# Patient Record
Sex: FEMALE | Race: WHITE | NOT HISPANIC OR LATINO | Employment: FULL TIME | URBAN - METROPOLITAN AREA
[De-identification: names, ages, dates, MRNs, and addresses within clinical notes are randomized per-mention and may not be internally consistent; named-entity substitution may affect disease eponyms.]

---

## 2017-01-12 ENCOUNTER — COMMUNICATION - HEALTHEAST (OUTPATIENT)
Dept: FAMILY MEDICINE | Facility: CLINIC | Age: 52
End: 2017-01-12

## 2017-01-12 DIAGNOSIS — F41.1 GENERALIZED ANXIETY DISORDER: ICD-10-CM

## 2017-02-23 ENCOUNTER — COMMUNICATION - HEALTHEAST (OUTPATIENT)
Dept: FAMILY MEDICINE | Facility: CLINIC | Age: 52
End: 2017-02-23

## 2017-04-10 ENCOUNTER — COMMUNICATION - HEALTHEAST (OUTPATIENT)
Dept: FAMILY MEDICINE | Facility: CLINIC | Age: 52
End: 2017-04-10

## 2017-05-12 ENCOUNTER — COMMUNICATION - HEALTHEAST (OUTPATIENT)
Dept: FAMILY MEDICINE | Facility: CLINIC | Age: 52
End: 2017-05-12

## 2017-05-12 DIAGNOSIS — I10 ESSENTIAL HYPERTENSION, MALIGNANT: ICD-10-CM

## 2017-05-13 ENCOUNTER — OFFICE VISIT - HEALTHEAST (OUTPATIENT)
Dept: FAMILY MEDICINE | Facility: CLINIC | Age: 52
End: 2017-05-13

## 2017-05-13 DIAGNOSIS — R10.32 ABDOMINAL PAIN, ACUTE, LEFT LOWER QUADRANT: ICD-10-CM

## 2017-05-13 DIAGNOSIS — K57.92 DIVERTICULITIS: ICD-10-CM

## 2017-05-13 DIAGNOSIS — D50.9 MICROCYTIC ANEMIA: ICD-10-CM

## 2017-05-15 ENCOUNTER — COMMUNICATION - HEALTHEAST (OUTPATIENT)
Dept: FAMILY MEDICINE | Facility: CLINIC | Age: 52
End: 2017-05-15

## 2017-05-23 ENCOUNTER — OFFICE VISIT - HEALTHEAST (OUTPATIENT)
Dept: FAMILY MEDICINE | Facility: CLINIC | Age: 52
End: 2017-05-23

## 2017-05-23 DIAGNOSIS — F41.1 GENERALIZED ANXIETY DISORDER: ICD-10-CM

## 2017-05-23 DIAGNOSIS — Z12.31 SCREENING MAMMOGRAM, ENCOUNTER FOR: ICD-10-CM

## 2017-05-23 DIAGNOSIS — Z91.09 OTHER ALLERGY, OTHER THAN TO MEDICINAL AGENTS: ICD-10-CM

## 2017-05-23 DIAGNOSIS — R00.2 INTERMITTENT PALPITATIONS: ICD-10-CM

## 2017-05-23 DIAGNOSIS — D64.9 ANEMIA: ICD-10-CM

## 2017-05-23 DIAGNOSIS — R79.89 LOW SERUM VITAMIN D: ICD-10-CM

## 2017-05-23 DIAGNOSIS — K57.92 ACUTE DIVERTICULITIS: ICD-10-CM

## 2017-05-23 DIAGNOSIS — I10 ESSENTIAL HYPERTENSION: ICD-10-CM

## 2017-05-23 LAB — LDLC SERPL CALC-MCNC: 100 MG/DL

## 2017-05-23 ASSESSMENT — MIFFLIN-ST. JEOR: SCORE: 1571.11

## 2017-05-29 ENCOUNTER — COMMUNICATION - HEALTHEAST (OUTPATIENT)
Dept: FAMILY MEDICINE | Facility: CLINIC | Age: 52
End: 2017-05-29

## 2017-05-29 DIAGNOSIS — F41.1 GENERALIZED ANXIETY DISORDER: ICD-10-CM

## 2017-08-24 ENCOUNTER — COMMUNICATION - HEALTHEAST (OUTPATIENT)
Dept: FAMILY MEDICINE | Facility: CLINIC | Age: 52
End: 2017-08-24

## 2017-08-28 ENCOUNTER — OFFICE VISIT - HEALTHEAST (OUTPATIENT)
Dept: FAMILY MEDICINE | Facility: CLINIC | Age: 52
End: 2017-08-28

## 2017-08-28 DIAGNOSIS — F41.1 GENERALIZED ANXIETY DISORDER: ICD-10-CM

## 2017-08-28 DIAGNOSIS — M54.50 LOW BACK PAIN: ICD-10-CM

## 2017-08-28 DIAGNOSIS — Z12.31 SCREENING MAMMOGRAM, ENCOUNTER FOR: ICD-10-CM

## 2017-08-28 ASSESSMENT — MIFFLIN-ST. JEOR: SCORE: 1607.51

## 2017-09-25 ENCOUNTER — OFFICE VISIT - HEALTHEAST (OUTPATIENT)
Dept: FAMILY MEDICINE | Facility: CLINIC | Age: 52
End: 2017-09-25

## 2017-09-25 ENCOUNTER — OFFICE VISIT - HEALTHEAST (OUTPATIENT)
Dept: PHYSICAL THERAPY | Facility: REHABILITATION | Age: 52
End: 2017-09-25

## 2017-09-25 DIAGNOSIS — G89.29 CHRONIC BILATERAL LOW BACK PAIN WITHOUT SCIATICA: ICD-10-CM

## 2017-09-25 DIAGNOSIS — M62.81 GENERALIZED MUSCLE WEAKNESS: ICD-10-CM

## 2017-09-25 DIAGNOSIS — E66.9 OBESITY, UNSPECIFIED: ICD-10-CM

## 2017-09-25 DIAGNOSIS — I10 ESSENTIAL HYPERTENSION: ICD-10-CM

## 2017-09-25 DIAGNOSIS — M54.50 CHRONIC BILATERAL LOW BACK PAIN WITHOUT SCIATICA: ICD-10-CM

## 2017-09-25 DIAGNOSIS — R07.0 THROAT PAIN: ICD-10-CM

## 2017-09-25 DIAGNOSIS — J02.9 PHARYNGITIS: ICD-10-CM

## 2017-09-27 ENCOUNTER — COMMUNICATION - HEALTHEAST (OUTPATIENT)
Dept: FAMILY MEDICINE | Facility: CLINIC | Age: 52
End: 2017-09-27

## 2017-09-27 DIAGNOSIS — I10 ESSENTIAL HYPERTENSION, MALIGNANT: ICD-10-CM

## 2017-09-29 ENCOUNTER — HOSPITAL ENCOUNTER (OUTPATIENT)
Dept: MAMMOGRAPHY | Facility: CLINIC | Age: 52
Discharge: HOME | End: 2017-09-29
Attending: FAMILY MEDICINE

## 2017-09-29 DIAGNOSIS — Z12.31 SCREENING MAMMOGRAM, ENCOUNTER FOR: ICD-10-CM

## 2017-10-10 ENCOUNTER — OFFICE VISIT - HEALTHEAST (OUTPATIENT)
Dept: PHYSICAL THERAPY | Facility: REHABILITATION | Age: 52
End: 2017-10-10

## 2017-10-10 DIAGNOSIS — G89.29 CHRONIC BILATERAL LOW BACK PAIN WITHOUT SCIATICA: ICD-10-CM

## 2017-10-10 DIAGNOSIS — M54.50 CHRONIC BILATERAL LOW BACK PAIN WITHOUT SCIATICA: ICD-10-CM

## 2017-10-10 DIAGNOSIS — M62.81 GENERALIZED MUSCLE WEAKNESS: ICD-10-CM

## 2017-10-13 ENCOUNTER — OFFICE VISIT - HEALTHEAST (OUTPATIENT)
Dept: PHYSICAL THERAPY | Facility: REHABILITATION | Age: 52
End: 2017-10-13

## 2017-10-13 DIAGNOSIS — G89.29 CHRONIC BILATERAL LOW BACK PAIN WITHOUT SCIATICA: ICD-10-CM

## 2017-10-13 DIAGNOSIS — M54.50 CHRONIC BILATERAL LOW BACK PAIN WITHOUT SCIATICA: ICD-10-CM

## 2017-10-13 DIAGNOSIS — M62.81 GENERALIZED MUSCLE WEAKNESS: ICD-10-CM

## 2017-10-17 ENCOUNTER — OFFICE VISIT - HEALTHEAST (OUTPATIENT)
Dept: PHYSICAL THERAPY | Facility: REHABILITATION | Age: 52
End: 2017-10-17

## 2017-10-17 DIAGNOSIS — G89.29 CHRONIC BILATERAL LOW BACK PAIN WITHOUT SCIATICA: ICD-10-CM

## 2017-10-17 DIAGNOSIS — M54.50 CHRONIC BILATERAL LOW BACK PAIN WITHOUT SCIATICA: ICD-10-CM

## 2017-10-17 DIAGNOSIS — M62.81 GENERALIZED MUSCLE WEAKNESS: ICD-10-CM

## 2017-10-24 ENCOUNTER — OFFICE VISIT - HEALTHEAST (OUTPATIENT)
Dept: PHYSICAL THERAPY | Facility: REHABILITATION | Age: 52
End: 2017-10-24

## 2017-10-24 DIAGNOSIS — M54.50 CHRONIC BILATERAL LOW BACK PAIN WITHOUT SCIATICA: ICD-10-CM

## 2017-10-24 DIAGNOSIS — M62.81 GENERALIZED MUSCLE WEAKNESS: ICD-10-CM

## 2017-10-24 DIAGNOSIS — G89.29 CHRONIC BILATERAL LOW BACK PAIN WITHOUT SCIATICA: ICD-10-CM

## 2017-10-24 DIAGNOSIS — I10 ESSENTIAL HYPERTENSION: ICD-10-CM

## 2018-03-06 ENCOUNTER — AMBULATORY - HEALTHEAST (OUTPATIENT)
Dept: FAMILY MEDICINE | Facility: CLINIC | Age: 53
End: 2018-03-06

## 2018-03-06 ENCOUNTER — HOSPITAL ENCOUNTER (OUTPATIENT)
Dept: CT IMAGING | Facility: CLINIC | Age: 53
Discharge: HOME | End: 2018-03-06
Attending: FAMILY MEDICINE

## 2018-03-06 ENCOUNTER — OFFICE VISIT - HEALTHEAST (OUTPATIENT)
Dept: FAMILY MEDICINE | Facility: CLINIC | Age: 53
End: 2018-03-06

## 2018-03-06 DIAGNOSIS — R10.32 LLQ PAIN: ICD-10-CM

## 2018-03-06 DIAGNOSIS — K57.92 DIVERTICULITIS: ICD-10-CM

## 2018-03-06 DIAGNOSIS — N28.1 RENAL CYST: ICD-10-CM

## 2018-03-06 DIAGNOSIS — K76.89 HEPATIC CYST: ICD-10-CM

## 2018-03-06 LAB
BASOPHILS # BLD AUTO: 0.1 THOU/UL (ref 0–0.2)
BASOPHILS NFR BLD AUTO: 1 % (ref 0–2)
EOSINOPHIL # BLD AUTO: 0.3 THOU/UL (ref 0–0.4)
EOSINOPHIL NFR BLD AUTO: 3 % (ref 0–6)
ERYTHROCYTE [DISTWIDTH] IN BLOOD BY AUTOMATED COUNT: 16.1 % (ref 11–14.5)
HCT VFR BLD AUTO: 34.5 % (ref 35–47)
HGB BLD-MCNC: 11 G/DL (ref 12–16)
LYMPHOCYTES # BLD AUTO: 3.3 THOU/UL (ref 0.8–4.4)
LYMPHOCYTES NFR BLD AUTO: 30 % (ref 20–40)
MCH RBC QN AUTO: 21.7 PG (ref 27–34)
MCHC RBC AUTO-ENTMCNC: 31.8 G/DL (ref 32–36)
MCV RBC AUTO: 68 FL (ref 80–100)
MONOCYTES # BLD AUTO: 0.7 THOU/UL (ref 0–0.9)
MONOCYTES NFR BLD AUTO: 6 % (ref 2–10)
NEUTROPHILS # BLD AUTO: 6.9 THOU/UL (ref 2–7.7)
NEUTROPHILS NFR BLD AUTO: 61 % (ref 50–70)
PLATELET # BLD AUTO: 455 THOU/UL (ref 140–440)
PMV BLD AUTO: 7.8 FL (ref 7–10)
RBC # BLD AUTO: 5.06 MILL/UL (ref 3.8–5.4)
WBC: 11.3 THOU/UL (ref 4–11)

## 2018-03-06 ASSESSMENT — MIFFLIN-ST. JEOR: SCORE: 1610.12

## 2018-06-30 ENCOUNTER — COMMUNICATION - HEALTHEAST (OUTPATIENT)
Dept: FAMILY MEDICINE | Facility: CLINIC | Age: 53
End: 2018-06-30

## 2018-06-30 DIAGNOSIS — I10 ESSENTIAL HYPERTENSION, MALIGNANT: ICD-10-CM

## 2018-07-20 ENCOUNTER — OFFICE VISIT - HEALTHEAST (OUTPATIENT)
Dept: FAMILY MEDICINE | Facility: CLINIC | Age: 53
End: 2018-07-20

## 2018-07-20 ENCOUNTER — RECORDS - HEALTHEAST (OUTPATIENT)
Dept: GENERAL RADIOLOGY | Facility: CLINIC | Age: 53
End: 2018-07-20

## 2018-07-20 DIAGNOSIS — M25.562 PAIN IN LEFT KNEE: ICD-10-CM

## 2018-07-20 DIAGNOSIS — M25.562 ACUTE PAIN OF LEFT KNEE: ICD-10-CM

## 2018-07-20 RX ORDER — DICLOFENAC SODIUM 75 MG/1
75 TABLET, DELAYED RELEASE ORAL 2 TIMES DAILY PRN
Qty: 45 TABLET | Refills: 0 | Status: SHIPPED | OUTPATIENT
Start: 2018-07-20 | End: 2022-11-16

## 2018-09-24 ENCOUNTER — COMMUNICATION - HEALTHEAST (OUTPATIENT)
Dept: FAMILY MEDICINE | Facility: CLINIC | Age: 53
End: 2018-09-24

## 2018-09-24 DIAGNOSIS — F41.1 GENERALIZED ANXIETY DISORDER: ICD-10-CM

## 2018-10-24 ENCOUNTER — COMMUNICATION - HEALTHEAST (OUTPATIENT)
Dept: FAMILY MEDICINE | Facility: CLINIC | Age: 53
End: 2018-10-24

## 2018-10-24 DIAGNOSIS — I10 ESSENTIAL HYPERTENSION, MALIGNANT: ICD-10-CM

## 2018-11-28 ENCOUNTER — OFFICE VISIT - HEALTHEAST (OUTPATIENT)
Dept: FAMILY MEDICINE | Facility: CLINIC | Age: 53
End: 2018-11-28

## 2018-11-28 DIAGNOSIS — N28.1 RENAL CYST, RIGHT: ICD-10-CM

## 2018-11-28 DIAGNOSIS — G89.29 CHRONIC PAIN OF LEFT KNEE: ICD-10-CM

## 2018-11-28 DIAGNOSIS — M25.562 CHRONIC PAIN OF LEFT KNEE: ICD-10-CM

## 2018-11-28 ASSESSMENT — MIFFLIN-ST. JEOR: SCORE: 1634.16

## 2018-11-30 ENCOUNTER — HOSPITAL ENCOUNTER (OUTPATIENT)
Dept: CT IMAGING | Facility: CLINIC | Age: 53
Discharge: HOME | End: 2018-11-30
Attending: FAMILY MEDICINE

## 2018-11-30 DIAGNOSIS — N28.1 RENAL CYST, RIGHT: ICD-10-CM

## 2018-12-14 ENCOUNTER — COMMUNICATION - HEALTHEAST (OUTPATIENT)
Dept: FAMILY MEDICINE | Facility: CLINIC | Age: 53
End: 2018-12-14

## 2018-12-14 RX ORDER — BACLOFEN 10 MG/1
TABLET ORAL
Qty: 30 TABLET | Refills: 1 | Status: SHIPPED | OUTPATIENT
Start: 2018-12-14 | End: 2022-11-16

## 2019-02-27 ENCOUNTER — COMMUNICATION - HEALTHEAST (OUTPATIENT)
Dept: FAMILY MEDICINE | Facility: CLINIC | Age: 54
End: 2019-02-27

## 2019-02-27 DIAGNOSIS — I10 ESSENTIAL HYPERTENSION, MALIGNANT: ICD-10-CM

## 2019-05-22 ENCOUNTER — COMMUNICATION - HEALTHEAST (OUTPATIENT)
Dept: FAMILY MEDICINE | Facility: CLINIC | Age: 54
End: 2019-05-22

## 2019-05-22 DIAGNOSIS — I10 ESSENTIAL HYPERTENSION, MALIGNANT: ICD-10-CM

## 2019-09-09 ENCOUNTER — COMMUNICATION - HEALTHEAST (OUTPATIENT)
Dept: FAMILY MEDICINE | Facility: CLINIC | Age: 54
End: 2019-09-09

## 2019-09-09 DIAGNOSIS — I10 ESSENTIAL HYPERTENSION, MALIGNANT: ICD-10-CM

## 2019-10-04 ENCOUNTER — RECORDS - HEALTHEAST (OUTPATIENT)
Dept: ADMINISTRATIVE | Facility: OTHER | Age: 54
End: 2019-10-04

## 2019-12-04 ENCOUNTER — COMMUNICATION - HEALTHEAST (OUTPATIENT)
Dept: FAMILY MEDICINE | Facility: CLINIC | Age: 54
End: 2019-12-04

## 2019-12-04 DIAGNOSIS — F41.1 GENERALIZED ANXIETY DISORDER: ICD-10-CM

## 2019-12-09 ENCOUNTER — COMMUNICATION - HEALTHEAST (OUTPATIENT)
Dept: FAMILY MEDICINE | Facility: CLINIC | Age: 54
End: 2019-12-09

## 2020-01-02 ENCOUNTER — COMMUNICATION - HEALTHEAST (OUTPATIENT)
Dept: FAMILY MEDICINE | Facility: CLINIC | Age: 55
End: 2020-01-02

## 2020-01-02 DIAGNOSIS — I10 ESSENTIAL HYPERTENSION, MALIGNANT: ICD-10-CM

## 2020-01-15 ENCOUNTER — OFFICE VISIT - HEALTHEAST (OUTPATIENT)
Dept: FAMILY MEDICINE | Facility: CLINIC | Age: 55
End: 2020-01-15

## 2020-01-15 DIAGNOSIS — I10 ESSENTIAL HYPERTENSION: ICD-10-CM

## 2020-01-15 DIAGNOSIS — E78.5 DYSLIPIDEMIA: ICD-10-CM

## 2020-01-15 DIAGNOSIS — R00.2 PALPITATIONS: ICD-10-CM

## 2020-01-15 DIAGNOSIS — Z11.59 ENCOUNTER FOR HEPATITIS C SCREENING TEST FOR LOW RISK PATIENT: ICD-10-CM

## 2020-01-15 DIAGNOSIS — K21.9 GASTROESOPHAGEAL REFLUX DISEASE WITHOUT ESOPHAGITIS: ICD-10-CM

## 2020-01-15 DIAGNOSIS — Z71.6 ENCOUNTER FOR TOBACCO USE CESSATION COUNSELING: ICD-10-CM

## 2020-01-15 DIAGNOSIS — Z11.4 SCREENING FOR HUMAN IMMUNODEFICIENCY VIRUS WITHOUT PRESENCE OF RISK FACTORS: ICD-10-CM

## 2020-01-15 DIAGNOSIS — R79.89 LOW SERUM VITAMIN D: ICD-10-CM

## 2020-01-15 DIAGNOSIS — Z71.6 ENCOUNTER FOR SMOKING CESSATION COUNSELING: ICD-10-CM

## 2020-01-15 DIAGNOSIS — Z12.31 VISIT FOR SCREENING MAMMOGRAM: ICD-10-CM

## 2020-01-15 DIAGNOSIS — F41.1 GENERALIZED ANXIETY DISORDER: ICD-10-CM

## 2020-01-15 LAB
ALBUMIN SERPL-MCNC: 4.2 G/DL (ref 3.5–5)
ALP SERPL-CCNC: 89 U/L (ref 45–120)
ALT SERPL W P-5'-P-CCNC: 23 U/L (ref 0–45)
ANION GAP SERPL CALCULATED.3IONS-SCNC: 11 MMOL/L (ref 5–18)
AST SERPL W P-5'-P-CCNC: 19 U/L (ref 0–40)
BILIRUB SERPL-MCNC: 0.3 MG/DL (ref 0–1)
BUN SERPL-MCNC: 13 MG/DL (ref 8–22)
CALCIUM SERPL-MCNC: 9.9 MG/DL (ref 8.5–10.5)
CHLORIDE BLD-SCNC: 103 MMOL/L (ref 98–107)
CHOLEST SERPL-MCNC: 254 MG/DL
CO2 SERPL-SCNC: 25 MMOL/L (ref 22–31)
CREAT SERPL-MCNC: 0.79 MG/DL (ref 0.6–1.1)
ERYTHROCYTE [DISTWIDTH] IN BLOOD BY AUTOMATED COUNT: 13.7 % (ref 11–14.5)
FASTING STATUS PATIENT QL REPORTED: YES
GFR SERPL CREATININE-BSD FRML MDRD: >60 ML/MIN/1.73M2
GLUCOSE BLD-MCNC: 105 MG/DL (ref 70–125)
HCT VFR BLD AUTO: 40.9 % (ref 35–47)
HDLC SERPL-MCNC: 59 MG/DL
HGB BLD-MCNC: 13.3 G/DL (ref 12–16)
HIV 1+2 AB+HIV1 P24 AG SERPL QL IA: NEGATIVE
LDLC SERPL CALC-MCNC: 161 MG/DL
MAGNESIUM SERPL-MCNC: 1.7 MG/DL (ref 1.8–2.6)
MCH RBC QN AUTO: 24.2 PG (ref 27–34)
MCHC RBC AUTO-ENTMCNC: 32.4 G/DL (ref 32–36)
MCV RBC AUTO: 75 FL (ref 80–100)
PLATELET # BLD AUTO: 378 THOU/UL (ref 140–440)
PMV BLD AUTO: 8.2 FL (ref 7–10)
POTASSIUM BLD-SCNC: 4.4 MMOL/L (ref 3.5–5)
PROT SERPL-MCNC: 7.4 G/DL (ref 6–8)
RBC # BLD AUTO: 5.48 MILL/UL (ref 3.8–5.4)
SODIUM SERPL-SCNC: 139 MMOL/L (ref 136–145)
TRIGL SERPL-MCNC: 168 MG/DL
WBC: 8.7 THOU/UL (ref 4–11)

## 2020-01-15 ASSESSMENT — MIFFLIN-ST. JEOR: SCORE: 1597.87

## 2020-01-16 LAB
25(OH)D3 SERPL-MCNC: 13.5 NG/ML (ref 30–80)
25(OH)D3 SERPL-MCNC: 13.5 NG/ML (ref 30–80)
ATRIAL RATE - MUSE: 84 BPM
DIASTOLIC BLOOD PRESSURE - MUSE: NORMAL
HCV AB SERPL QL IA: NEGATIVE
INTERPRETATION ECG - MUSE: NORMAL
P AXIS - MUSE: 29 DEGREES
PR INTERVAL - MUSE: 148 MS
QRS DURATION - MUSE: 80 MS
QT - MUSE: 366 MS
QTC - MUSE: 432 MS
R AXIS - MUSE: 30 DEGREES
SYSTOLIC BLOOD PRESSURE - MUSE: NORMAL
T AXIS - MUSE: 10 DEGREES
VENTRICULAR RATE- MUSE: 84 BPM

## 2020-01-21 ENCOUNTER — AMBULATORY - HEALTHEAST (OUTPATIENT)
Dept: FAMILY MEDICINE | Facility: CLINIC | Age: 55
End: 2020-01-21

## 2020-01-21 DIAGNOSIS — E83.42 HYPOMAGNESEMIA: ICD-10-CM

## 2020-01-21 DIAGNOSIS — E55.9 VITAMIN D DEFICIENCY: ICD-10-CM

## 2020-01-22 ENCOUNTER — COMMUNICATION - HEALTHEAST (OUTPATIENT)
Dept: FAMILY MEDICINE | Facility: CLINIC | Age: 55
End: 2020-01-22

## 2020-01-22 DIAGNOSIS — E55.9 VITAMIN D DEFICIENCY: ICD-10-CM

## 2020-01-23 ASSESSMENT — ANXIETY QUESTIONNAIRES
6. BECOMING EASILY ANNOYED OR IRRITABLE: NOT AT ALL
4. TROUBLE RELAXING: SEVERAL DAYS
2. NOT BEING ABLE TO STOP OR CONTROL WORRYING: MORE THAN HALF THE DAYS
3. WORRYING TOO MUCH ABOUT DIFFERENT THINGS: MORE THAN HALF THE DAYS
7. FEELING AFRAID AS IF SOMETHING AWFUL MIGHT HAPPEN: SEVERAL DAYS
5. BEING SO RESTLESS THAT IT IS HARD TO SIT STILL: NOT AT ALL
GAD7 TOTAL SCORE: 9
1. FEELING NERVOUS, ANXIOUS, OR ON EDGE: NEARLY EVERY DAY
IF YOU CHECKED OFF ANY PROBLEMS ON THIS QUESTIONNAIRE, HOW DIFFICULT HAVE THESE PROBLEMS MADE IT FOR YOU TO DO YOUR WORK, TAKE CARE OF THINGS AT HOME, OR GET ALONG WITH OTHER PEOPLE: SOMEWHAT DIFFICULT

## 2020-01-23 ASSESSMENT — PATIENT HEALTH QUESTIONNAIRE - PHQ9: SUM OF ALL RESPONSES TO PHQ QUESTIONS 1-9: 13

## 2020-01-28 ENCOUNTER — HOSPITAL ENCOUNTER (OUTPATIENT)
Dept: CARDIOLOGY | Facility: CLINIC | Age: 55
Discharge: HOME | End: 2020-01-28
Attending: FAMILY MEDICINE

## 2020-01-28 DIAGNOSIS — R00.2 PALPITATIONS: ICD-10-CM

## 2020-01-29 ENCOUNTER — COMMUNICATION - HEALTHEAST (OUTPATIENT)
Dept: FAMILY MEDICINE | Facility: CLINIC | Age: 55
End: 2020-01-29

## 2020-02-27 ENCOUNTER — COMMUNICATION - HEALTHEAST (OUTPATIENT)
Dept: FAMILY MEDICINE | Facility: CLINIC | Age: 55
End: 2020-02-27

## 2020-02-27 DIAGNOSIS — Z71.6 ENCOUNTER FOR SMOKING CESSATION COUNSELING: ICD-10-CM

## 2020-03-09 ENCOUNTER — COMMUNICATION - HEALTHEAST (OUTPATIENT)
Dept: FAMILY MEDICINE | Facility: CLINIC | Age: 55
End: 2020-03-09

## 2020-03-09 DIAGNOSIS — F41.1 GENERALIZED ANXIETY DISORDER: ICD-10-CM

## 2020-03-29 ENCOUNTER — COMMUNICATION - HEALTHEAST (OUTPATIENT)
Dept: FAMILY MEDICINE | Facility: CLINIC | Age: 55
End: 2020-03-29

## 2020-03-29 DIAGNOSIS — I10 ESSENTIAL HYPERTENSION, MALIGNANT: ICD-10-CM

## 2020-03-29 DIAGNOSIS — E55.9 VITAMIN D DEFICIENCY: ICD-10-CM

## 2020-04-05 ENCOUNTER — COMMUNICATION - HEALTHEAST (OUTPATIENT)
Dept: FAMILY MEDICINE | Facility: CLINIC | Age: 55
End: 2020-04-05

## 2020-04-05 DIAGNOSIS — Z71.6 ENCOUNTER FOR SMOKING CESSATION COUNSELING: ICD-10-CM

## 2020-05-04 ENCOUNTER — COMMUNICATION - HEALTHEAST (OUTPATIENT)
Dept: FAMILY MEDICINE | Facility: CLINIC | Age: 55
End: 2020-05-04

## 2020-05-04 DIAGNOSIS — K21.9 GASTROESOPHAGEAL REFLUX DISEASE WITHOUT ESOPHAGITIS: ICD-10-CM

## 2020-06-03 ENCOUNTER — COMMUNICATION - HEALTHEAST (OUTPATIENT)
Dept: FAMILY MEDICINE | Facility: CLINIC | Age: 55
End: 2020-06-03

## 2020-06-03 DIAGNOSIS — F41.1 GENERALIZED ANXIETY DISORDER: ICD-10-CM

## 2020-08-03 ENCOUNTER — RECORDS - HEALTHEAST (OUTPATIENT)
Dept: ADMINISTRATIVE | Facility: OTHER | Age: 55
End: 2020-08-03

## 2020-09-15 ENCOUNTER — COMMUNICATION - HEALTHEAST (OUTPATIENT)
Dept: FAMILY MEDICINE | Facility: CLINIC | Age: 55
End: 2020-09-15

## 2020-09-15 DIAGNOSIS — F41.1 GENERALIZED ANXIETY DISORDER: ICD-10-CM

## 2020-10-31 ENCOUNTER — COMMUNICATION - HEALTHEAST (OUTPATIENT)
Dept: FAMILY MEDICINE | Facility: CLINIC | Age: 55
End: 2020-10-31

## 2020-10-31 DIAGNOSIS — Z71.6 ENCOUNTER FOR SMOKING CESSATION COUNSELING: ICD-10-CM

## 2020-11-04 RX ORDER — VARENICLINE TARTRATE 1 MG/1
TABLET, FILM COATED ORAL
Qty: 180 TABLET | Refills: 0 | Status: SHIPPED | OUTPATIENT
Start: 2020-11-04 | End: 2022-11-16

## 2020-11-16 ENCOUNTER — OFFICE VISIT - HEALTHEAST (OUTPATIENT)
Dept: FAMILY MEDICINE | Facility: CLINIC | Age: 55
End: 2020-11-16

## 2020-11-16 ENCOUNTER — RECORDS - HEALTHEAST (OUTPATIENT)
Dept: GENERAL RADIOLOGY | Facility: CLINIC | Age: 55
End: 2020-11-16

## 2020-11-16 DIAGNOSIS — R10.9 UNSPECIFIED ABDOMINAL PAIN: ICD-10-CM

## 2020-11-16 DIAGNOSIS — R10.9 FLANK PAIN: ICD-10-CM

## 2020-11-16 DIAGNOSIS — J02.9 SORE THROAT: ICD-10-CM

## 2020-11-16 LAB
ALBUMIN UR-MCNC: NEGATIVE MG/DL
ANION GAP SERPL CALCULATED.3IONS-SCNC: 14 MMOL/L (ref 5–18)
APPEARANCE UR: CLEAR
BACTERIA #/AREA URNS HPF: ABNORMAL HPF
BASOPHILS # BLD AUTO: 0.1 THOU/UL (ref 0–0.2)
BASOPHILS NFR BLD AUTO: 1 % (ref 0–2)
BILIRUB UR QL STRIP: NEGATIVE
BUN SERPL-MCNC: 16 MG/DL (ref 8–22)
CALCIUM SERPL-MCNC: 9.8 MG/DL (ref 8.5–10.5)
CHLORIDE BLD-SCNC: 101 MMOL/L (ref 98–107)
CO2 SERPL-SCNC: 23 MMOL/L (ref 22–31)
COLOR UR AUTO: YELLOW
CREAT SERPL-MCNC: 0.82 MG/DL (ref 0.6–1.1)
EOSINOPHIL # BLD AUTO: 0.2 THOU/UL (ref 0–0.4)
EOSINOPHIL NFR BLD AUTO: 2 % (ref 0–6)
ERYTHROCYTE [DISTWIDTH] IN BLOOD BY AUTOMATED COUNT: 13.2 % (ref 11–14.5)
GFR SERPL CREATININE-BSD FRML MDRD: >60 ML/MIN/1.73M2
GLUCOSE BLD-MCNC: 104 MG/DL (ref 70–125)
GLUCOSE UR STRIP-MCNC: NEGATIVE MG/DL
HCT VFR BLD AUTO: 42.4 % (ref 35–47)
HGB BLD-MCNC: 13.8 G/DL (ref 12–16)
HGB UR QL STRIP: ABNORMAL
IMM GRANULOCYTES # BLD: 0.1 THOU/UL
IMM GRANULOCYTES NFR BLD: 1 %
KETONES UR STRIP-MCNC: NEGATIVE MG/DL
LEUKOCYTE ESTERASE UR QL STRIP: NEGATIVE
LYMPHOCYTES # BLD AUTO: 3 THOU/UL (ref 0.8–4.4)
LYMPHOCYTES NFR BLD AUTO: 34 % (ref 20–40)
MCH RBC QN AUTO: 26.3 PG (ref 27–34)
MCHC RBC AUTO-ENTMCNC: 32.5 G/DL (ref 32–36)
MCV RBC AUTO: 81 FL (ref 80–100)
MONOCYTES # BLD AUTO: 0.5 THOU/UL (ref 0–0.9)
MONOCYTES NFR BLD AUTO: 6 % (ref 2–10)
NEUTROPHILS # BLD AUTO: 5.2 THOU/UL (ref 2–7.7)
NEUTROPHILS NFR BLD AUTO: 57 % (ref 50–70)
NITRATE UR QL: NEGATIVE
PH UR STRIP: 6 [PH] (ref 5–8)
PLATELET # BLD AUTO: 362 THOU/UL (ref 140–440)
PMV BLD AUTO: 10.6 FL (ref 8.5–12.5)
POTASSIUM BLD-SCNC: 4.3 MMOL/L (ref 3.5–5)
RBC # BLD AUTO: 5.25 MILL/UL (ref 3.8–5.4)
RBC #/AREA URNS AUTO: ABNORMAL HPF
SODIUM SERPL-SCNC: 138 MMOL/L (ref 136–145)
SP GR UR STRIP: 1.01 (ref 1–1.03)
SQUAMOUS #/AREA URNS AUTO: ABNORMAL LPF
UROBILINOGEN UR STRIP-ACNC: ABNORMAL
WBC #/AREA URNS AUTO: ABNORMAL HPF
WBC: 9 THOU/UL (ref 4–11)

## 2020-11-16 ASSESSMENT — MIFFLIN-ST. JEOR: SCORE: 1681.78

## 2020-11-18 ENCOUNTER — COMMUNICATION - HEALTHEAST (OUTPATIENT)
Dept: SCHEDULING | Facility: CLINIC | Age: 55
End: 2020-11-18

## 2021-03-29 ENCOUNTER — COMMUNICATION - HEALTHEAST (OUTPATIENT)
Dept: FAMILY MEDICINE | Facility: CLINIC | Age: 56
End: 2021-03-29

## 2021-03-29 DIAGNOSIS — E83.42 HYPOMAGNESEMIA: ICD-10-CM

## 2021-03-29 DIAGNOSIS — K21.9 GASTROESOPHAGEAL REFLUX DISEASE WITHOUT ESOPHAGITIS: ICD-10-CM

## 2021-03-29 DIAGNOSIS — F41.1 GENERALIZED ANXIETY DISORDER: ICD-10-CM

## 2021-03-29 RX ORDER — VENLAFAXINE HYDROCHLORIDE 75 MG/1
CAPSULE, EXTENDED RELEASE ORAL
Qty: 90 CAPSULE | Refills: 0 | Status: SHIPPED | OUTPATIENT
Start: 2021-03-29 | End: 2021-12-19

## 2021-03-29 RX ORDER — MAGNESIUM OXIDE 400 MG/1
TABLET ORAL
Qty: 120 TABLET | Refills: 3 | Status: SHIPPED | OUTPATIENT
Start: 2021-03-29 | End: 2022-04-03

## 2021-04-01 ENCOUNTER — COMMUNICATION - HEALTHEAST (OUTPATIENT)
Dept: FAMILY MEDICINE | Facility: CLINIC | Age: 56
End: 2021-04-01

## 2021-04-16 ENCOUNTER — COMMUNICATION - HEALTHEAST (OUTPATIENT)
Dept: FAMILY MEDICINE | Facility: CLINIC | Age: 56
End: 2021-04-16

## 2021-05-02 ENCOUNTER — RECORDS - HEALTHEAST (OUTPATIENT)
Dept: FAMILY MEDICINE | Facility: CLINIC | Age: 56
End: 2021-05-02

## 2021-05-02 DIAGNOSIS — I10 ESSENTIAL HYPERTENSION, MALIGNANT: ICD-10-CM

## 2021-05-03 RX ORDER — LISINOPRIL/HYDROCHLOROTHIAZIDE 10-12.5 MG
TABLET ORAL
Qty: 90 TABLET | Refills: 0 | Status: SHIPPED | OUTPATIENT
Start: 2021-05-03 | End: 2021-09-15

## 2021-05-26 ASSESSMENT — PATIENT HEALTH QUESTIONNAIRE - PHQ9: SUM OF ALL RESPONSES TO PHQ QUESTIONS 1-9: 13

## 2021-05-27 ENCOUNTER — RECORDS - HEALTHEAST (OUTPATIENT)
Dept: ADMINISTRATIVE | Facility: CLINIC | Age: 56
End: 2021-05-27

## 2021-05-28 ASSESSMENT — ANXIETY QUESTIONNAIRES: GAD7 TOTAL SCORE: 9

## 2021-05-29 NOTE — TELEPHONE ENCOUNTER
RN cannot approve Refill Request    RN can NOT refill this medication overdue for office visits and/or labs.    Lex Coello, [facility] Triage/Med Refill 5/22/2019    Requested Prescriptions   Pending Prescriptions Disp Refills     lisinopril-hydrochlorothiazide (PRINZIDE,ZESTORETIC) 10-12.5 mg per tablet [Pharmacy Med Name: LISINOPRIL-HCTZ 10-12.5 MG TAB] 90 tablet 0     Sig: TAKE 1 TABLET BY MOUTH DAILY.       Diuretics/Combination Diuretics Refill Protocol  Failed - 5/22/2019 12:33 PM        Failed - Serum Potassium in past 12 months      No results found for: LN-POTASSIUM          Failed - Serum Sodium in past 12 months      No results found for: LN-SODIUM          Failed - Serum Creatinine in past 12 months      Creatinine   Date Value Ref Range Status   05/23/2017 0.82 0.60 - 1.10 mg/dL Final             Passed - Visit with PCP or prescribing provider visit in past 12 months     Last office visit with prescriber/PCP: 11/28/2018 Mansi Mccracken MD OR same dept: 11/28/2018 Mansi Mccracken MD OR same specialty: 11/28/2018 Mansi Mccracken MD  Last physical: 8/14/2015 Last MTM visit: Visit date not found   Next visit within 3 mo: Visit date not found  Next physical within 3 mo: Visit date not found  Prescriber OR PCP: Mansi Mccracken MD  Last diagnosis associated with med order: 1. Essential hypertension, malignant  - lisinopril-hydrochlorothiazide (PRINZIDE,ZESTORETIC) 10-12.5 mg per tablet [Pharmacy Med Name: LISINOPRIL-HCTZ 10-12.5 MG TAB]; TAKE 1 TABLET BY MOUTH DAILY.  Dispense: 90 tablet; Refill: 0    If protocol passes may refill for 12 months if within 3 months of last provider visit (or a total of 15 months).             Passed - Blood pressure on file in past 12 months     BP Readings from Last 1 Encounters:   11/28/18 122/78

## 2021-05-30 VITALS — WEIGHT: 230 LBS | BODY MASS INDEX: 43.46 KG/M2

## 2021-05-31 VITALS — HEIGHT: 61 IN | WEIGHT: 230.1 LBS | BODY MASS INDEX: 43.44 KG/M2

## 2021-05-31 VITALS — BODY MASS INDEX: 44.96 KG/M2 | WEIGHT: 238.13 LBS | HEIGHT: 61 IN

## 2021-05-31 VITALS — BODY MASS INDEX: 45.16 KG/M2 | WEIGHT: 239 LBS

## 2021-06-01 VITALS — WEIGHT: 238.7 LBS | BODY MASS INDEX: 45.07 KG/M2 | HEIGHT: 61 IN

## 2021-06-01 NOTE — TELEPHONE ENCOUNTER
RN cannot approve Refill Request    RN can NOT refill this medication Protocol failed and NO refill given.       Kristi Wren, [facility] Triage/Med Refill 9/10/2019    Requested Prescriptions   Pending Prescriptions Disp Refills     lisinopril-hydrochlorothiazide (PRINZIDE,ZESTORETIC) 10-12.5 mg per tablet [Pharmacy Med Name: LISINOPRIL-HCTZ 10-12.5 MG TAB] 90 tablet 0     Sig: TAKE 1 TABLET BY MOUTH DAILY.       Diuretics/Combination Diuretics Refill Protocol  Failed - 9/9/2019  5:10 PM        Failed - Serum Potassium in past 12 months      No results found for: LN-POTASSIUM          Failed - Serum Sodium in past 12 months      No results found for: LN-SODIUM          Failed - Serum Creatinine in past 12 months      Creatinine   Date Value Ref Range Status   05/23/2017 0.82 0.60 - 1.10 mg/dL Final             Passed - Visit with PCP or prescribing provider visit in past 12 months     Last office visit with prescriber/PCP: 11/28/2018 Mansi Mccracken MD OR same dept: 11/28/2018 Mansi Mccracken MD OR same specialty: 11/28/2018 Mansi Mccracken MD  Last physical: 8/14/2015 Last MTM visit: Visit date not found   Next visit within 3 mo: Visit date not found  Next physical within 3 mo: Visit date not found  Prescriber OR PCP: Mansi Mccracken MD  Last diagnosis associated with med order: 1. Essential hypertension, malignant  - lisinopril-hydrochlorothiazide (PRINZIDE,ZESTORETIC) 10-12.5 mg per tablet [Pharmacy Med Name: LISINOPRIL-HCTZ 10-12.5 MG TAB]; TAKE 1 TABLET BY MOUTH DAILY.  Dispense: 90 tablet; Refill: 0    If protocol passes may refill for 12 months if within 3 months of last provider visit (or a total of 15 months).             Passed - Blood pressure on file in past 12 months     BP Readings from Last 1 Encounters:   11/28/18 122/78

## 2021-06-02 VITALS — HEIGHT: 61 IN | WEIGHT: 244 LBS | BODY MASS INDEX: 46.07 KG/M2

## 2021-06-04 VITALS
BODY MASS INDEX: 44.56 KG/M2 | OXYGEN SATURATION: 96 % | WEIGHT: 236 LBS | RESPIRATION RATE: 20 BRPM | HEART RATE: 88 BPM | TEMPERATURE: 98.1 F | SYSTOLIC BLOOD PRESSURE: 124 MMHG | DIASTOLIC BLOOD PRESSURE: 84 MMHG | HEIGHT: 61 IN

## 2021-06-04 NOTE — TELEPHONE ENCOUNTER
RN cannot approve Refill Request    RN can NOT refill this medication PCP messaged that patient is overdue for Labs and Office Visit. Last office visit: 11/28/2018 Mansi Mccracken MD Last Physical: Visit date not found Last MTM visit: Visit date not found Last visit same specialty: 11/28/2018 Mansi Mccracken MD.  Next visit within 3 mo: Visit date not found  Next physical within 3 mo: Visit date not found      Seda ARANGO Ramón, [facility] Triage/Med Refill 1/2/2020    Requested Prescriptions   Pending Prescriptions Disp Refills     lisinopril-hydrochlorothiazide (PRINZIDE,ZESTORETIC) 10-12.5 mg per tablet 90 tablet 0     Sig: Take 1 tablet by mouth daily.       Diuretics/Combination Diuretics Refill Protocol  Failed - 1/2/2020  7:40 AM        Failed - Visit with PCP or prescribing provider visit in past 12 months     Last office visit with prescriber/PCP: 11/28/2018 Mansi Mccracken MD OR same dept: Visit date not found OR same specialty: 11/28/2018 Mansi Mccracken MD  Last physical: Visit date not found Last MTM visit: Visit date not found   Next visit within 3 mo: Visit date not found  Next physical within 3 mo: Visit date not found  Prescriber OR PCP: Mansi Mccracken MD  Last diagnosis associated with med order: 1. Essential hypertension, malignant  - lisinopril-hydrochlorothiazide (PRINZIDE,ZESTORETIC) 10-12.5 mg per tablet; Take 1 tablet by mouth daily.  Dispense: 90 tablet; Refill: 0    If protocol passes may refill for 12 months if within 3 months of last provider visit (or a total of 15 months).             Failed - Serum Potassium in past 12 months      No results found for: LN-POTASSIUM          Failed - Serum Sodium in past 12 months      No results found for: LN-SODIUM          Failed - Blood pressure on file in past 12 months     BP Readings from Last 1 Encounters:   11/28/18 122/78             Failed - Serum Creatinine in past 12 months      Creatinine   Date Value Ref Range Status    05/23/2017 0.82 0.60 - 1.10 mg/dL Final

## 2021-06-04 NOTE — TELEPHONE ENCOUNTER
RN cannot approve Refill Request    RN can NOT refill this medication PCP messaged that patient is overdue for Labs and Office Visit. Last office visit: 11/28/2018 Mansi Mccracken MD Last Physical: Visit date not found Last MTM visit: Visit date not found Last visit same specialty: 11/28/2018 Mansi Mccracken MD.  Next visit within 3 mo: Visit date not found  Next physical within 3 mo: Visit date not found      Seda ARANGO Ramón, [facility] Triage/Med Refill 12/5/2019    Requested Prescriptions   Pending Prescriptions Disp Refills     venlafaxine (EFFEXOR-XR) 75 MG 24 hr capsule [Pharmacy Med Name: VENLAFAXINE HCL ER 75 MG CAP] 90 capsule 3     Sig: TAKE ONE CAPSULE BY MOUTH DAILY       Venlafaxine/Desvenlafaxine Refill Protocol Failed - 12/4/2019  3:00 AM        Failed - LFT or AST or ALT in last year     Albumin   Date Value Ref Range Status   05/23/2017 4.1 3.5 - 5.0 g/dL Final     Bilirubin, Total   Date Value Ref Range Status   05/23/2017 0.2 0.0 - 1.0 mg/dL Final     Bilirubin, Direct   Date Value Ref Range Status   04/22/2013 <0.1 <0.6 mg/dL Final     Alkaline Phosphatase   Date Value Ref Range Status   05/23/2017 75 45 - 120 U/L Final     AST   Date Value Ref Range Status   05/23/2017 16 0 - 40 U/L Final     ALT   Date Value Ref Range Status   05/23/2017 16 0 - 45 U/L Final     Protein, Total   Date Value Ref Range Status   05/23/2017 7.6 6.0 - 8.0 g/dL Final                Failed - Fasting lipid cascade in last year     Cholesterol   Date Value Ref Range Status   08/14/2015 244 (H) <=199 mg/dL Final     Triglycerides   Date Value Ref Range Status   08/14/2015 173 (H) <=149 mg/dL Final     HDL Cholesterol   Date Value Ref Range Status   08/14/2015 52 >=40 mg/dL Final     LDL Calculated   Date Value Ref Range Status   08/14/2015 157 (H) 0 - 129 mg/dL Final     Patient Fasting > 8hrs?   Date Value Ref Range Status   08/14/2015 Unknown  Final             Failed - PCP or prescribing provider visit in  last year     Last office visit with prescriber/PCP: 11/28/2018 Mansi Mccracken MD OR same dept: Visit date not found OR same specialty: 11/28/2018 Mansi Mccracken MD  Last physical: Visit date not found Last MTM visit: Visit date not found   Next visit within 3 mo: Visit date not found  Next physical within 3 mo: Visit date not found  Prescriber OR PCP: Mansi Mccracken MD  Last diagnosis associated with med order: 1. Generalized anxiety disorder  - venlafaxine (EFFEXOR-XR) 75 MG 24 hr capsule [Pharmacy Med Name: VENLAFAXINE HCL ER 75 MG CAP]; TAKE ONE CAPSULE BY MOUTH DAILY  Dispense: 90 capsule; Refill: 3    If protocol passes may refill for 12 months if within 3 months of last provider visit (or a total of 15 months).             Failed - Blood Pressure in last year     BP Readings from Last 1 Encounters:   11/28/18 122/78

## 2021-06-04 NOTE — TELEPHONE ENCOUNTER
Left message to call back for: pt  Information to relay to patient:  Please help pt schedule a Ov or phy before next refill is needed.

## 2021-06-05 VITALS
BODY MASS INDEX: 46.19 KG/M2 | HEIGHT: 62 IN | DIASTOLIC BLOOD PRESSURE: 92 MMHG | WEIGHT: 251 LBS | OXYGEN SATURATION: 97 % | HEART RATE: 100 BPM | SYSTOLIC BLOOD PRESSURE: 161 MMHG

## 2021-06-05 NOTE — PATIENT INSTRUCTIONS - HE
I have sent a prescription to your pharmacy for Chantix.  Begin Chantix 7 days before your quit date.    Consider whether you would like to increase venlafaxine to help with your anxiety.  Consider seeing a therapist.  Therapist groups in the area:    [place] [facility] ([place] and other [facility]) [phone]    [facility] ([place]) [phone]    [facility] ([place]) [phone]    [facility] ([place]) [phone]       Your EKG is normal.  We should assess your heart further with an event monitor.  Our cardiology office will contact you to get this scheduled.  You wear this for up to 2 weeks, and do indicate when you have symptoms.

## 2021-06-05 NOTE — TELEPHONE ENCOUNTER
Reason contacted:  Results   Information relayed:  Below message. Please resend vitamin D electronically to the pharmacy.    Additional questions:  No  Further follow-up needed:  No  Okay to leave a detailed message:  No

## 2021-06-05 NOTE — TELEPHONE ENCOUNTER
----- Message from Mansi Mccracken MD sent at 1/21/2020  4:16 PM CST -----  Your magnesium level is low.  This can sometimes be seen related to your blood pressure medication.  I recommend adding a magnesium supplement such as magnesium oxide 250 mg once daily.  Let us recheck your magnesium level at a follow-up lab only visit in about 8 weeks.  I will send in a prescription.

## 2021-06-05 NOTE — TELEPHONE ENCOUNTER
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 Please send in My Chart or place a hard copy at the  if My Chart is not possible .  Thank You     Okay to leave a detailed message: Yes

## 2021-06-05 NOTE — PROGRESS NOTES
Assessment/Plan:     1. Hypertension  Adequately controlled on lisinopril hydrochlorothiazide.  She will continue.  Encouraged healthy lifestyle habits.  Will check basic metabolic panel in monitoring of this medication.  - Comprehensive Metabolic Panel    2. Palpitations  EKG today is unrevealing.  We will look for electrolyte or renal disturbances with basic metabolic panel.  We will check hemogram to ensure anemia is not contributing.  Will assess for thyroid dysfunction with a thyroid cascade.  Will check magnesium level as hypomagnesemia could be contributing.  Orders placed for event monitor to see if can capture a palpitation to further characterize.  Notify with onset visual symptoms or worsening.  Reviewed red flags and emergent symptoms.  - Electrocardiogram Perform and Read  - Comprehensive Metabolic Panel  - HM2(CBC w/o Differential)  - Magnesium  - COLLIN Monitor Hook-Up; Future    3. Generalized anxiety disorder  Doing well overall on venlafaxine, encourage consideration of cognitive behavioral therapy and consideration of increase of venlafaxine dose, she declines today.  Encouraged healthy lifestyle habits.    4. Encounter for tobacco use cessation counseling  Strongly advised cessation.  Prescription is provided for Chantix.  Discussed potential side effects.  Discussed risks of worsened depression symptoms and suicidal ideation.    5. Low serum vitamin D  Encouraged daily supplement.  Will check vitamin D level today.  - Vitamin D, Total (25-Hydroxy)    6. Gastroesophageal reflux disease without esophagitis  Encouraged healthy lifestyle habits.  Because she has daily symptoms, I recommend omeprazole intake once daily.  Consider weaning attempts in the future.  - omeprazole (PRILOSEC) 20 MG capsule; Take 1 capsule (20 mg total) by mouth daily.  Dispense: 30 capsule; Refill: 3    7. Visit for screening mammogram  Referral placed for screening mammogram.  - Mammo Screening Bilateral; Future    8.  Dyslipidemia  We will obtain fasting lipids today to screen for underlying cardiovascular risk factors.  - Lipid [place] FASTING    9. Encounter for smoking cessation counseling  Encouraged tobacco cessation as above.  - varenicline (CHANTIX STARTING MONTH BOX) 0.5 mg (11)- 1 mg (42) tablet; 1 wk before you stop smoking take 0.5mg daily on days 1-3, 0.5mg 2 times each day on days 4-7, then 1mg 2 times daily.  Dispense: 53 tablet; Refill: 0  - varenicline (CHANTIX) 1 mg tablet; Take 1 tab by mouth two times a day. Take after eating with a full glass of water. NOTE: Dispense as maintenance for refills only.  Dispense: 60 tablet; Refill: 2    10. Screening for human immunodeficiency virus without presence of risk factors  We will check screening HIV level today.  - HIV Antigen/Antibody Screening [place]    11. Encounter for hepatitis C screening test for low risk patient  We will check screening hepatitis C level today.  - Hepatitis C Antibody (Anti-HCV)      Patient Instructions   I have sent a prescription to your pharmacy for Chantix.  Begin Chantix 7 days before your quit date.    Consider whether you would like to increase venlafaxine to help with your anxiety.  Consider seeing a therapist.  Therapist groups in the area:    [place] [facility] ([place] and other [facility]) [phone]    [facility] ([place]) [phone]    [facility] ([place]) [phone]    [facility] ([place]) [phone]       Your EKG is normal.  We should assess your heart further with an event monitor.  Our cardiology office will contact you to get this scheduled.  You wear this for up to 2 weeks, and do indicate when you have symptoms.       Return in about 6 months (around 7/15/2020) for Anxiety.      Subjective:      Gia Sultana is a 54 y.o. female presenting to clinic today for follow-up on her chronic conditions.  Remains on lisinopril hydrochlorothiazide management of  hypertension.  Tolerating well.Denies lightheadedness, dizziness, headaches, chest pain, dyspnea, or swelling.  Exercise currently limited due to left knee difficulties, working with [facility] in this regard, received recent corticosteroid injection for medial osteoarthritis.  She is also been having more pain in her bilateral hips and her left low back, especially with laying down.  Taking Aleve or Tylenol for this.  History of anxiety, remains on venlafaxine.  Admits that she has had a few more panic as recently.  Also notes that recently she has had episodes of heart racing, occurring about 1 time per week.  Seems to be independent and anxiety.  States that when it occurs it lasts for several hours.  Is never associated with activity.  She does take caffeine, no recent increase.  Appetite seems to fluctuate and is usually either nonexistent or greatly increased.  Describes drinking 4 alcoholic beverages 2 to 3 days/week.  History of dyslipidemia, due for follow-up, she is fasting.  History of vitamin D deficiency that is due for follow-up.  Notes significant stressors as both her parents were admitted to a skilled nursing facility and she is been struggling with her brother.    Current Outpatient Medications   Medication Sig Dispense Refill     aspirin 81 MG EC tablet Take 81 mg by mouth daily.       cetirizine (ZYRTEC) 10 mg cap Take by mouth.       lisinopril-hydrochlorothiazide (PRINZIDE,ZESTORETIC) 10-12.5 mg per tablet Take 1 tablet by mouth daily. 90 tablet 0     omeprazole (PRILOSEC) 20 MG capsule Take 1 capsule (20 mg total) by mouth daily. 30 capsule 3     venlafaxine (EFFEXOR-XR) 75 MG 24 hr capsule TAKE ONE CAPSULE BY MOUTH DAILY 90 capsule 0     baclofen (LIORESAL) 10 MG tablet TAKE 1 TABLET (10 MG TOTAL) BY MOUTH 3 (THREE) TIMES A DAY. 30 tablet 1     cholecalciferol, vitamin D3, 1,250 mcg (50,000 unit) capsule Take 50,000 Units by mouth 2 (two) times a week for 16 doses. 16 capsule 0      diclofenac (VOLTAREN) 75 MG EC tablet Take 1 tablet (75 mg total) by mouth 2 (two) times a day as needed. 45 tablet 0     magnesium oxide (MAGOX) 400 mg (241.3 mg magnesium) tablet Take 1 tablet (400 mg total) by mouth daily. 200 tablet 1     varenicline (CHANTIX STARTING MONTH BOX) 0.5 mg (11)- 1 mg (42) tablet 1 wk before you stop smoking take 0.5mg daily on days 1-3, 0.5mg 2 times each day on days 4-7, then 1mg 2 times daily. 53 tablet 0     varenicline (CHANTIX) 1 mg tablet Take 1 tab by mouth two times a day. Take after eating with a full glass of water. NOTE: Dispense as maintenance for refills only. 60 tablet 2     No current facility-administered medications for this visit.        Past Medical History, Family History, and Social History reviewed.  Past Medical History:   Diagnosis Date     Cholelithiasis      Depression      GERD (gastroesophageal reflux disease)      Hypertension      Past Surgical History:   Procedure Laterality Date      SECTION, CLASSIC       CHOLECYSTECTOMY       COLONOSCOPY      one polyp removed.     MT  DELIVERY ONLY      Description:  Section;  Recorded: 10/16/2008;  Comments: x2     MT LAP,CHOLECYSTECTOMY      Description: Cholecystectomy Laparoscopic;  Recorded: 04/15/2008;     MT REVISN TRACHEA,CERVICAL      Description: Tracheoplasty Cervical;  Recorded: 10/16/2008;  Comments: Trach inserted as a child     Penicillins; Sulfa (sulfonamide antibiotics); and Nexium [esomeprazole magnesium]  Family History   Problem Relation Age of Onset     Diabetes Mother      Obesity Mother      Dementia Father      Diabetes Brother      Obesity Brother      Social History     Socioeconomic History     Marital status:      Spouse name: Not on file     Number of children: Not on file     Years of education: Not on file     Highest education level: Not on file   Occupational History     Not on file   Social Needs     Financial resource strain: Not on file     Food  "insecurity:     Worry: Not on file     Inability: Not on file     Transportation needs:     Medical: Not on file     Non-medical: Not on file   Tobacco Use     Smoking status: Current Some Day Smoker     Packs/day: 0.25     Types: Cigarettes     Smokeless tobacco: Never Used   Substance and Sexual Activity     Alcohol use: Yes     Alcohol/week: 0.0 - 5.0 standard drinks     Drug use: No     Sexual activity: Yes     Partners: Male     Comment: vasectomy in    Lifestyle     Physical activity:     Days per week: Not on file     Minutes per session: Not on file     Stress: Not on file   Relationships     Social connections:     Talks on phone: Not on file     Gets together: Not on file     Attends [employer] service: Not on file     Active member of club or organization: Not on file     Attends meetings of clubs or organizations: Not on file     Relationship status: Not on file     Intimate partner violence:     Fear of current or ex partner: Not on file     Emotionally abused: Not on file     Physically abused: Not on file     Forced sexual activity: Not on file   Other Topics Concern     Not on file   Social History Narrative     Not on file         Review of systems is as stated in HPI, and the remainder of the 10 system review is otherwise negative.    Objective:     Vitals:    01/15/20 0739   BP: 124/84   Pulse: 88   Resp: 20   Temp: 98.1  F (36.7  C)   TempSrc: Oral   SpO2: 96%   Weight: (!) 236 lb (107 kg)   Height: 5' 1\" (1.549 m)    Body mass index is 44.59 kg/m .    Alert female.  Mildly anxious.  Normal psychomotor activities.  Able to converse without difficulty.  Mucous membranes moist.  Neck supple without lymphadenopathy or thyromegaly.  Heart with regular rate and rhythm.  Lungs are clear.  Abdomen soft and nontender.  Extremities are without edema and with good capillary refill.    I ordered and personally reviewed a 12-lead EKG which reveals normal sinus rhythm, she has T wave inversion in leads " III and aVF.  Notes small Q waves in leads II, III, and aVF.  This is unchanged from prior EKG.    This note has been dictated using voice recognition software. Any grammatical or context distortions are unintentional and inherent to the the software.

## 2021-06-06 NOTE — TELEPHONE ENCOUNTER
Refill Approved    Rx renewed per Medication Renewal Policy. Medication was last renewed on 1/15/20.    Kristi Wren, [facility] Triage/Med Refill 2/28/2020     Requested Prescriptions   Pending Prescriptions Disp Refills     varenicline (CHANTIX EVANS) 0.5 mg (11)- 1 mg (42) tablet [Pharmacy Med Name: CHANTIX STARTING MONTH BOX] 52 tablet 0     Sig: PLEASE SEE ATTACHED FOR DETAILED DIRECTIONS       Varenicline Refill Protocol Passed - 2/27/2020  4:10 PM        Passed - Normal Serum Creatinine in past 12 months      Creatinine   Date Value Ref Range Status   01/15/2020 0.79 0.60 - 1.10 mg/dL Final             Passed - PCP or prescribing provider visit in last 12 or next 3 months.     Last office visit with prescriber/PCP: 1/15/2020 Mansi Mccracken MD OR same dept: 1/15/2020 Mansi Mccracken MD  Last physical: Visit date not found       Next visit within 3 mo: Visit date not found  Next physical within 3 mo: Visit date not found  Prescriber OR PCP: Mansi Mccracken MD  Last diagnosis associated with med order: 1. Encounter for smoking cessation counseling  - varenicline (CHANTIX EVANS) 0.5 mg (11)- 1 mg (42) tablet [Pharmacy Med Name: CHANTIX STARTING MONTH BOX]; PLEASE SEE ATTACHED FOR DETAILED DIRECTIONS  Dispense: 52 tablet; Refill: 0     Requested Prescriptions     Pending Prescriptions Disp Refills     varenicline (CHANTIX EVANS) 0.5 mg (11)- 1 mg (42) tablet [Pharmacy Med Name: CHANTIX STARTING MONTH BOX] 52 tablet 0     Sig: PLEASE SEE ATTACHED FOR DETAILED DIRECTIONS     May refill for 3 months if protocol passes.

## 2021-06-07 NOTE — TELEPHONE ENCOUNTER
RN cannot approve Refill Request    RN can NOT refill this medication PCP to review - allergy/contraindication. Last office visit: 1/15/2020 Mansi Mccracken MD Last Physical: Visit date not found Last MTM visit: Visit date not found Last visit same specialty: 1/15/2020 Mansi Mccracken MD.  Next visit within 3 mo: Visit date not found  Next physical within 3 mo: Visit date not found      Ariadne Parson, [facility] Triage/Med Refill 5/5/2020    Requested Prescriptions   Pending Prescriptions Disp Refills     omeprazole (PRILOSEC) 20 MG capsule [Pharmacy Med Name: OMEPRAZOLE DR 20 MG CAPSULE] 90 capsule 1     Sig: TAKE 1 CAPSULE BY MOUTH EVERY DAY       GI Medications Refill Protocol Passed - 5/4/2020 12:13 AM        Passed - PCP or prescribing provider visit in last 12 or next 3 months.     Last office visit with prescriber/PCP: 1/15/2020 Mansi Mccracken MD OR same dept: 1/15/2020 Mansi Mccracken MD OR same specialty: 1/15/2020 Mansi Mccracken MD  Last physical: Visit date not found Last MTM visit: Visit date not found   Next visit within 3 mo: Visit date not found  Next physical within 3 mo: Visit date not found  Prescriber OR PCP: Mansi Mccracken MD  Last diagnosis associated with med order: 1. Gastroesophageal reflux disease without esophagitis  - omeprazole (PRILOSEC) 20 MG capsule [Pharmacy Med Name: OMEPRAZOLE DR 20 MG CAPSULE]; TAKE 1 CAPSULE BY MOUTH EVERY DAY  Dispense: 90 capsule; Refill: 1    If protocol passes may refill for 12 months if within 3 months of last provider visit (or a total of 15 months).

## 2021-06-07 NOTE — TELEPHONE ENCOUNTER
Refill Approved    Rx renewed per Medication Renewal Policy. Medication was last renewed on 1/2/20.    Kristi Wren, [facility] Triage/Med Refill 3/31/2020     Requested Prescriptions   Pending Prescriptions Disp Refills     cholecalciferol, vitamin D3, 1,250 mcg (50,000 unit) capsule [Pharmacy Med Name: VITAMIN D3 50,000 UNIT CAPSULE] 16 capsule 0     Sig: TAKE 1 CAPSULE BY MOUTH TWICE PER WEEK FOR 16 DOSES       There is no refill protocol information for this order        lisinopriL-hydrochlorothiazide (PRINZIDE,ZESTORETIC) 10-12.5 mg per tablet [Pharmacy Med Name: LISINOPRIL-HCTZ 10-12.5 MG TAB] 90 tablet 0     Sig: TAKE 1 TABLET BY MOUTH EVERY DAY       Diuretics/Combination Diuretics Refill Protocol  Passed - 3/29/2020 12:11 PM        Passed - Visit with PCP or prescribing provider visit in past 12 months     Last office visit with prescriber/PCP: 1/15/2020 Mansi Mccracken MD OR same dept: 1/15/2020 Mansi Mccracken MD OR same specialty: 1/15/2020 Mansi Mccracken MD  Last physical: Visit date not found Last MTM visit: Visit date not found   Next visit within 3 mo: Visit date not found  Next physical within 3 mo: Visit date not found  Prescriber OR PCP: Mansi Mccracken MD  Last diagnosis associated with med order: 1. Vitamin D deficiency  - cholecalciferol, vitamin D3, 1,250 mcg (50,000 unit) capsule [Pharmacy Med Name: VITAMIN D3 50,000 UNIT CAPSULE]; TAKE 1 CAPSULE BY MOUTH TWICE PER WEEK FOR 16 DOSES  Dispense: 16 capsule; Refill: 0    2. Essential hypertension, malignant  - lisinopriL-hydrochlorothiazide (PRINZIDE,ZESTORETIC) 10-12.5 mg per tablet [Pharmacy Med Name: LISINOPRIL-HCTZ 10-12.5 MG TAB]; TAKE 1 TABLET BY MOUTH EVERY DAY  Dispense: 90 tablet; Refill: 0    If protocol passes may refill for 12 months if within 3 months of last provider visit (or a total of 15 months).             Passed - Serum Potassium in past 12 months      Lab Results   Component Value Date    Potassium 4.4  01/15/2020             Passed - Serum Sodium in past 12 months      Lab Results   Component Value Date    Sodium 139 01/15/2020             Passed - Blood pressure on file in past 12 months     BP Readings from Last 1 Encounters:   01/15/20 124/84             Passed - Serum Creatinine in past 12 months      Creatinine   Date Value Ref Range Status   01/15/2020 0.79 0.60 - 1.10 mg/dL Final

## 2021-06-07 NOTE — TELEPHONE ENCOUNTER
RN cannot approve Refill Request    RN can NOT refill this medication med is not covered by policy/route to provider.         Kristi Wren, [facility] Triage/Med Refill 3/31/2020    Requested Prescriptions   Pending Prescriptions Disp Refills     cholecalciferol, vitamin D3, 1,250 mcg (50,000 unit) capsule [Pharmacy Med Name: VITAMIN D3 50,000 UNIT CAPSULE] 16 capsule 0     Sig: TAKE 1 CAPSULE BY MOUTH TWICE PER WEEK FOR 16 DOSES       There is no refill protocol information for this order      Signed Prescriptions Disp Refills    lisinopriL-hydrochlorothiazide (PRINZIDE,ZESTORETIC) 10-12.5 mg per tablet 90 tablet 3     Sig: TAKE 1 TABLET BY MOUTH EVERY DAY       Diuretics/Combination Diuretics Refill Protocol  Passed - 3/29/2020 12:11 PM        Passed - Visit with PCP or prescribing provider visit in past 12 months     Last office visit with prescriber/PCP: 1/15/2020 Mansi Mccracken MD OR same dept: 1/15/2020 Mansi Mccracken MD OR same specialty: 1/15/2020 Mansi Mccracken MD  Last physical: Visit date not found Last MTM visit: Visit date not found   Next visit within 3 mo: Visit date not found  Next physical within 3 mo: Visit date not found  Prescriber OR PCP: Mansi Mccracken MD  Last diagnosis associated with med order: 1. Vitamin D deficiency  - cholecalciferol, vitamin D3, 1,250 mcg (50,000 unit) capsule [Pharmacy Med Name: VITAMIN D3 50,000 UNIT CAPSULE]; TAKE 1 CAPSULE BY MOUTH TWICE PER WEEK FOR 16 DOSES  Dispense: 16 capsule; Refill: 0    2. Essential hypertension, malignant  - lisinopriL-hydrochlorothiazide (PRINZIDE,ZESTORETIC) 10-12.5 mg per tablet; TAKE 1 TABLET BY MOUTH EVERY DAY  Dispense: 90 tablet; Refill: 3    If protocol passes may refill for 12 months if within 3 months of last provider visit (or a total of 15 months).             Passed - Serum Potassium in past 12 months      Lab Results   Component Value Date    Potassium 4.4 01/15/2020             Passed - Serum Sodium in  past 12 months      Lab Results   Component Value Date    Sodium 139 01/15/2020             Passed - Blood pressure on file in past 12 months     BP Readings from Last 1 Encounters:   01/15/20 124/84             Passed - Serum Creatinine in past 12 months      Creatinine   Date Value Ref Range Status   01/15/2020 0.79 0.60 - 1.10 mg/dL Final

## 2021-06-08 NOTE — TELEPHONE ENCOUNTER
Fax received from [facility] on [address] to refill Venlafaxine.    Last office visit 1/15/2020:  3. Generalized anxiety disorder  Doing well overall on venlafaxine, encourage consideration of cognitive behavioral therapy and consideration of increase of venlafaxine dose, she declines today.  Encouraged healthy lifestyle habits.

## 2021-06-10 NOTE — PROGRESS NOTES
Chief Complaint   Patient presents with     GI Problem     pain- diverticulitis      History of Present Illness: Nursing notes reviewed. Patient felt constipated throughout the day yesterday. She had a small BM in the late evening, with stool being formed, thinner, no black or bloody stool noted. No mucous. Patient has had similar LLQ discomfort with diverticulitis in the past, being diagnosed last with this about 1 year ago. She has had diverticulitis twice in the past 4 years. Her abdominal pain is on/off, worse with activity. No recent fevers or chills. She relates she has had heavier menstrual periods the past year.    Review of systems: See history of present illness, otherwise negative.     Current Outpatient Prescriptions   Medication Sig Dispense Refill     aspirin 81 MG EC tablet Take 81 mg by mouth daily.       cetirizine (ZYRTEC) 10 mg cap Take by mouth.       lisinopril-hydrochlorothiazide (PRINZIDE,ZESTORETIC) 10-12.5 mg per tablet TAKE ONE TABLET BY MOUTH ONE TIME DAILY 90 tablet 2     omeprazole (PRILOSEC) 20 MG capsule Take by mouth daily.       venlafaxine (EFFEXOR-XR) 75 MG 24 hr capsule TAKE TWO CAPSULES BY MOUTH DAILY 60 capsule 3     azithromycin (ZITHROMAX) 250 MG tablet Take 2 tabs today, then 1 daily for 4 more daily 6 tablet 0     ciprofloxacin HCl (CIPRO) 500 MG tablet Take 1 tablet (500 mg total) by mouth 2 (two) times a day for 10 days. 20 tablet 0     HYDROcodone-acetaminophen 5-325 mg per tablet Take 1 tablet by mouth every 6 (six) hours as needed for pain (for pain relief). 15 tablet 0     metroNIDAZOLE (FLAGYL) 500 MG tablet Take 1 tablet (500 mg total) by mouth 3 (three) times a day for 10 days. 30 tablet 0     phentermine (ADIPEX-P) 37.5 mg tablet Start with 1/2 a tablet daily (18.75mg) up to one full tablet daily. 90 tablet 0     No current facility-administered medications for this visit.        Past Medical History:   Diagnosis Date     Cholelithiasis      Depression      GERD  (gastroesophageal reflux disease)      Hypertension       Past Surgical History:   Procedure Laterality Date      SECTION, CLASSIC       CHOLECYSTECTOMY       COLONOSCOPY      one polyp removed.     [place]  DELIVERY ONLY      Description:  Section;  Recorded: 10/16/2008;  Comments: x2     [place] LAP,CHOLECYSTECTOMY      Description: Cholecystectomy Laparoscopic;  Recorded: 04/15/2008;     [place] REVISN TRACHEA,CERVICAL      Description: Tracheoplasty Cervical;  Recorded: 10/16/2008;  Comments: Trach inserted as a child      Social History     Social History     Marital status:      Spouse name: N/A     Number of children: N/A     Years of education: N/A     Social History Main Topics     Smoking status: Current Some Day Smoker     Packs/day: 0.25     Types: Cigarettes     Smokeless tobacco: Never Used     Alcohol use 0.0 - 3.0 oz/week     0 - 5 Shots of liquor per week     Drug use: No     Sexual activity: Yes     Partners: Male      Comment: vasectomy in      Other Topics Concern     None     Social History Narrative       History   Smoking Status     Current Some Day Smoker     Packs/day: 0.25     Types: Cigarettes   Smokeless Tobacco     Never Used      Exam:   Blood pressure 130/82, pulse 100, temperature 98.3  F (36.8  C), temperature source Oral, resp. rate 16, weight (!) 230 lb (104.3 kg), last menstrual period 2017, SpO2 97 %, not currently breastfeeding.    EXAM:   General: Vital signs reviewed with mild tachycardia noted. Patient is in no acute appearing distress when sitting in chair, but she does tend to move more slowly in room. Breathing is non labored appearing. Patient is alert and oriented x 3.   Heart: Normal rate and rhythm without murmur  Lungs: Clear to auscultation with good air flow bilaterally.  Back: no tenderness  Abdomen soft, tender with some guarding over the LLQ, no abnormal masses. Normal bowel sounds.  Skin: warm and dry  The cbc was reviewed with  patient at exam, which showed an elevation of the WBC with an increased absolute neutrophil count noted. A mildly low Hgb was noted, which I discussed with patient as possibly being related to her menstruation which she can further discuss with her primary care provider.  Assessment/Plan   1. Abdominal pain, acute, left lower quadrant  HM1(CBC and Differential)    HM1 (CBC with Diff)    HYDROcodone-acetaminophen 5-325 mg per tablet   2. Diverticulitis  ciprofloxacin HCl (CIPRO) 500 MG tablet    metroNIDAZOLE (FLAGYL) 500 MG tablet    HYDROcodone-acetaminophen 5-325 mg per tablet       There are no Patient Instructions on file for this visit.   Damir Merrill DO

## 2021-06-10 NOTE — PROGRESS NOTES
Assessment/Plan:     1. Acute diverticulitis  Significantly improved.  We review importance of gradually increasing the fiber to the diet again.  Consider follow-up colonoscopy though up-to-date with routine cancer screening colonoscopy performed in 2013.  I anticipate that her chest discomfort was directly related to the medications, improving now after completion, so I think we can keep an eye for now.    2. Essential hypertension  Controlled on current regimen.  Will obtain labs as noted.  - Comprehensive Metabolic Panel  - LDL Cholesterol, Direct    3. Generalized anxiety disorder  Doing very well on current dose of medication.  We discussed option of reducing dose to 75 mg of her extended release venlafaxine, she will consider in light of her tachycardia.    4. Allergies  We reviewed over-the-counter antihistamines.  Could also consider fluticasone nasal spray.    5. Low serum vitamin D  Will obtain labs as noted to assess for underlying concerning factors.  Elevation in heart rate also likely related obtain follow-up vitamin D level today.  Reviewed importance of daily supplements.  - Vitamin D, Total (25-Hydroxy)    6. Intermittent palpitations  Will obtain labs as noted.  Likely related to elevated heart rate.  Negative prior evaluation.  May be related to venlafaxine, will consider reducing dose as noted above.  - Thyroid Cascade  - Magnesium    7. Anemia  Obtain follow-up hemogram and ferritin.  Could be contributing to tachycardia.  - Ferritin  - HM2(CBC w/o Differential)    8. Screening mammogram, encounter for  Referral placed for screening mammogram.  - Mammo Screening Bilateral; Future      Subjective:      Gia Sultana is a 51 y.o. female presented to clinic today for a few concerns.  Noting continued elevated heart rate and intermittent palpitations, greatest after eating.  She has a history of low iron.  She is also on venlafaxine for anxiety, feels anxiety has been stable and controlled  however she does get stressed due to her father with Alzheimer's and a brother with throat cancer.  Currently taking venlafaxine 150 mg extended release daily.  History of allergies, noting increased symptoms especially in December.  She actually had onset of rash.  She has not tried any medications for this.  History of vitamin D deficiency, due for follow-up this.  She is also history of hyperlipidemia.  Recent diagnosis of diverticulitis with about 6 or 8 weeks of intermittent left-sided pain, then developed bloating.  This was treated with antibiotics for general complete.  She noted some chest pressure would occur that resolved with belching, that seems to be settling down now that she is off the antibiotics.  She has had a bit of increased reflux symptoms with palpitations after eating.    Notes that her menses intermittently are very heavy.    She is due for mammogram.  Last colonoscopy  with follow-up recommended in 10 years.    Current Outpatient Prescriptions   Medication Sig Dispense Refill     aspirin 81 MG EC tablet Take 81 mg by mouth daily.       cetirizine (ZYRTEC) 10 mg cap Take by mouth.       lisinopril-hydrochlorothiazide (PRINZIDE,ZESTORETIC) 10-12.5 mg per tablet Take 1 tablet by mouth daily. 90 tablet 0     omeprazole (PRILOSEC) 20 MG capsule Take by mouth daily.       venlafaxine (EFFEXOR-XR) 75 MG 24 hr capsule TAKE TWO CAPSULES BY MOUTH DAILY 60 capsule 3     No current facility-administered medications for this visit.        Past Medical History, Family History, and Social History reviewed.  Past Medical History:   Diagnosis Date     Cholelithiasis      Depression      GERD (gastroesophageal reflux disease)      Hypertension      Past Surgical History:   Procedure Laterality Date      SECTION, CLASSIC       CHOLECYSTECTOMY       COLONOSCOPY      one polyp removed.     [place]  DELIVERY ONLY      Description:  Section;  Recorded: 10/16/2008;  Comments: x2     [place]  "LAP,CHOLECYSTECTOMY      Description: Cholecystectomy Laparoscopic;  Recorded: 04/15/2008;     [place] REVISN TRACHEA,CERVICAL      Description: Tracheoplasty Cervical;  Recorded: 10/16/2008;  Comments: Trach inserted as a child     Penicillins; Sulfa (sulfonamide antibiotics); and Nexium [esomeprazole magnesium]  Family History   Problem Relation Age of Onset     Diabetes Mother      Obesity Mother      Dementia Father      Diabetes Brother      Obesity Brother      Social History     Social History     Marital status:      Spouse name: N/A     Number of children: N/A     Years of education: N/A     Occupational History     Not on file.     Social History Main Topics     Smoking status: Current Some Day Smoker     Packs/day: 0.25     Types: Cigarettes     Smokeless tobacco: Never Used     Alcohol use 0.0 - 3.0 oz/week     0 - 5 Shots of liquor per week     Drug use: No     Sexual activity: Yes     Partners: Male      Comment: vasectomy in      Other Topics Concern     Not on file     Social History Narrative         Review of systems is as stated in HPI, and the remainder of the 10 system review is otherwise negative.    Objective:     Vitals:    05/23/17 1336   BP: 120/70   Patient Site: Right Arm   Patient Position: Sitting   Cuff Size: Adult Large   Pulse: 92   Resp: 20   Temp: 97.9  F (36.6  C)   TempSrc: Oral   SpO2: 98%   Weight: (!) 230 lb 1.6 oz (104.4 kg)   Height: 5' 1\" (1.549 m)    Body mass index is 43.48 kg/(m^2).    Alert female.  Mildly anxious.  Mucous membranes moist.  Oropharynx clear.  Neck supple without lymphadenopathy.  Heart with regular rate and rhythm.  Heart rate approximately 95 bpm.  Lungs clear.  Extremities without edema.      This note has been dictated using voice recognition software. Any grammatical or context distortions are unintentional and inherent to the the software.     "

## 2021-06-11 NOTE — TELEPHONE ENCOUNTER
Refill Approved    Rx renewed per Medication Renewal Policy. Medication was last renewed on 6/3/20.    Kristi Wren, [facility] Triage/Med Refill 9/17/2020     Requested Prescriptions   Pending Prescriptions Disp Refills     venlafaxine (EFFEXOR-XR) 75 MG 24 hr capsule 90 capsule 0     Sig: Take 1 capsule (75 mg total) by mouth daily.       Venlafaxine/Desvenlafaxine Refill Protocol Passed - 9/15/2020  3:34 PM        Passed - LFT or AST or ALT in last year     Albumin   Date Value Ref Range Status   01/15/2020 4.2 3.5 - 5.0 g/dL Final     Bilirubin, Total   Date Value Ref Range Status   01/15/2020 0.3 0.0 - 1.0 mg/dL Final     Bilirubin, Direct   Date Value Ref Range Status   04/22/2013 <0.1 <0.6 mg/dL Final     Alkaline Phosphatase   Date Value Ref Range Status   01/15/2020 89 45 - 120 U/L Final     AST   Date Value Ref Range Status   01/15/2020 19 0 - 40 U/L Final     ALT   Date Value Ref Range Status   01/15/2020 23 0 - 45 U/L Final     Protein, Total   Date Value Ref Range Status   01/15/2020 7.4 6.0 - 8.0 g/dL Final                Passed - Fasting lipid cascade in last year     Cholesterol   Date Value Ref Range Status   01/15/2020 254 (H) <=199 mg/dL Final     Triglycerides   Date Value Ref Range Status   01/15/2020 168 (H) <=149 mg/dL Final     HDL Cholesterol   Date Value Ref Range Status   01/15/2020 59 >=50 mg/dL Final     LDL Calculated   Date Value Ref Range Status   01/15/2020 161 (H) <=129 mg/dL Final     Patient Fasting > 8hrs?   Date Value Ref Range Status   01/15/2020 Yes  Final             Passed - PCP or prescribing provider visit in last year     Last office visit with prescriber/PCP: 1/15/2020 Mansi Mccracken MD OR same dept: 1/15/2020 Mansi Mccracken MD OR same specialty: 1/15/2020 Mansi Mccracken MD  Last physical: Visit date not found Last MTM visit: Visit date not found   Next visit within 3 mo: Visit date not found  Next physical within 3 mo: Visit date not  found  Prescriber OR PCP: Mansi Mccracken MD  Last diagnosis associated with med order: 1. Generalized anxiety disorder  - venlafaxine (EFFEXOR-XR) 75 MG 24 hr capsule; Take 1 capsule (75 mg total) by mouth daily.  Dispense: 90 capsule; Refill: 0    If protocol passes may refill for 12 months if within 3 months of last provider visit (or a total of 15 months).             Passed - Blood Pressure in last year     BP Readings from Last 1 Encounters:   01/15/20 124/84

## 2021-06-12 NOTE — TELEPHONE ENCOUNTER
Refill Approved    Rx renewed per Medication Renewal Policy. Medication was last renewed on 4/7/20.    Kristi Wren, [facility] Triage/Med Refill 11/4/2020     Requested Prescriptions   Pending Prescriptions Disp Refills     varenicline (CHANTIX) 1 mg tablet [Pharmacy Med Name: CHANTIX 1 MG TABLET] 180 tablet 0     Sig: TAKE 1 TAB BY MOUTH TWO TIMES A DAY. TAKE AFTER EATING WITH A FULL GLASS OF WATER.       Varenicline Refill Protocol Passed - 10/31/2020  9:19 PM        Passed - Normal Serum Creatinine in past 12 months      Creatinine   Date Value Ref Range Status   01/15/2020 0.79 0.60 - 1.10 mg/dL Final             Passed - PCP or prescribing provider visit in last 12 or next 3 months.     Last office visit with prescriber/PCP: 1/15/2020 Mansi Mccracken MD OR same dept: 1/15/2020 Mansi Mccracken MD  Last physical: Visit date not found       Next visit within 3 mo: Visit date not found  Next physical within 3 mo: Visit date not found  Prescriber OR PCP: Mansi Mccracken MD  Last diagnosis associated with med order: 1. Encounter for smoking cessation counseling  - CHANTIX 1 mg tablet [Pharmacy Med Name: CHANTIX 1 MG TABLET]; TAKE 1 TAB BY MOUTH TWO TIMES A DAY. TAKE AFTER EATING WITH A FULL GLASS OF WATER.  Dispense: 180 tablet; Refill: 0     Requested Prescriptions     Pending Prescriptions Disp Refills     varenicline (CHANTIX) 1 mg tablet [Pharmacy Med Name: CHANTIX 1 MG TABLET] 180 tablet 0     Sig: TAKE 1 TAB BY MOUTH TWO TIMES A DAY. TAKE AFTER EATING WITH A FULL GLASS OF WATER.     May refill for 3 months if protocol passes.

## 2021-06-12 NOTE — PROGRESS NOTES
Assessment/Plan:     1. Low back pain  No red flag symptoms.  I suspect her experience with chills and diarrhea are related to acute illness, now resolved.  I do not feel that this requires further evaluation at this time.  Referral placed for physical therapy.  Advised either ibuprofen or naproxen on a regular basis, consider initiation of omeprazole regularly while taking.  Ice or heat.  Range of motion exercises.  Reviewed warning signs and symptoms, when to seek care.  Will obtain urinalysis to ensure no other contributing factors.  Will do a trial of baclofen as well.  - Ambulatory referral to PT/OT  - Urinalysis-UC if Indicated    2. Generalized anxiety disorder  Refill provided of venlafaxine.  She will continue 1 tablet daily which is working well for her.  - venlafaxine (EFFEXOR-XR) 75 MG 24 hr capsule; TAKE ONE CAPSULES BY MOUTH DAILY  Dispense: 90 capsule; Refill: 3      Subjective:      Gia Sultana is a 51 y.o. female presented to clinic today for evaluation of low back pain.  She is become quite ill initially with some significant nausea, vomiting, diarrhea and developed some back pain at that time which is throughout her low back, not lateralizing and not midline.  Present most of time since then been gradually progressive, worse with bending, painting, and then swelling.  She has been less active as a result of it.  She had went to the zoo recently, was doing a lot of slow walking, noted significant flare following that, describes being in bed for 2 days.  It is a little bit better today.  She pushed a wheeled cart at school and that seemed to irritate it as well just over a week ago, this was immediately followed by diarrhea, heaviness in her arms and legs, chills, dizziness, and paresthesias that went from her elbows to her fingers and knees to her toes.  The back pain worsened severely.  That all resolved within half a day.  Her legs still feel just a little bit heavier week after that but  otherwise all other symptoms have resolved.  Intermittent pain in her left leg radiating into her left great toe.  No prior history of same.  No known injuries other than as noted.  She denies any pain or burning with urination, urinary frequency, but is concerned that this could be kidney related.    Current Outpatient Prescriptions   Medication Sig Dispense Refill     aspirin 81 MG EC tablet Take 81 mg by mouth daily.       baclofen (LIORESAL) 10 MG tablet Take 1 tablet (10 mg total) by mouth 3 (three) times a day. 30 tablet 1     cetirizine (ZYRTEC) 10 mg cap Take by mouth.       lisinopril-hydrochlorothiazide (PRINZIDE,ZESTORETIC) 10-12.5 mg per tablet Take 1 tablet by mouth daily. 90 tablet 0     omeprazole (PRILOSEC) 20 MG capsule Take by mouth daily.       venlafaxine (EFFEXOR-XR) 75 MG 24 hr capsule TAKE ONE CAPSULES BY MOUTH DAILY 90 capsule 3     No current facility-administered medications for this visit.        Past Medical History, Family History, and Social History reviewed.  Past Medical History:   Diagnosis Date     Cholelithiasis      Depression      GERD (gastroesophageal reflux disease)      Hypertension      Past Surgical History:   Procedure Laterality Date      SECTION, CLASSIC       CHOLECYSTECTOMY       COLONOSCOPY      one polyp removed.     SD  DELIVERY ONLY      Description:  Section;  Recorded: 10/16/2008;  Comments: x2     SD LAP,CHOLECYSTECTOMY      Description: Cholecystectomy Laparoscopic;  Recorded: 04/15/2008;     SD REVISN TRACHEA,CERVICAL      Description: Tracheoplasty Cervical;  Recorded: 10/16/2008;  Comments: Trach inserted as a child     Penicillins; Sulfa (sulfonamide antibiotics); and Nexium [esomeprazole magnesium]  Family History   Problem Relation Age of Onset     Diabetes Mother      Obesity Mother      Dementia Father      Diabetes Brother      Obesity Brother      Social History     Social History     Marital status:      Spouse name:  "N/A     Number of children: N/A     Years of education: N/A     Occupational History     Not on file.     Social History Main Topics     Smoking status: Current Some Day Smoker     Packs/day: 0.25     Types: Cigarettes     Smokeless tobacco: Never Used     Alcohol use 0.0 - 3.0 oz/week     0 - 5 Shots of liquor per week     Drug use: No     Sexual activity: Yes     Partners: Male      Comment: vasectomy in      Other Topics Concern     Not on file     Social History Narrative         Review of systems is as stated in HPI, and the remainder of the 10 system review is otherwise negative.    Objective:     Vitals:    08/28/17 1541   BP: 122/74   Patient Site: Left Arm   Patient Position: Sitting   Cuff Size: Adult Large   Pulse: 78   Resp: 20   Temp: 98  F (36.7  C)   TempSrc: Oral   Weight: (!) 238 lb 2 oz (108 kg)   Height: 5' 1\" (1.549 m)    Body mass index is 44.99 kg/(m^2).    Alert female.  Abdomen is soft and nontender.  No CVA tenderness.  No midline spine tenderness.  Mild spasm and tenderness of the paraspinous muscles of the lumbar and upper thoracic region.  Range of motion notable for intact forward flexion to touching toes, twisting.  Extension and lateral bending slightly reduced with extension to just about 5  and lateral bending to fingertips at knees describes tightness with these exercises.  5 out of 5 strength throughout her lower extremities.  Symmetric deep tendon reflexes.  Sensation intact.  Negative straight leg raise bilaterally.      This note has been dictated using voice recognition software. Any grammatical or context distortions are unintentional and inherent to the the software.   "

## 2021-06-13 NOTE — PROGRESS NOTES
[facility]   [facility]-Pel[facility] Initial Evaluation    Patient Name: Gia Sultana  Date of evaluation: 9/25/2017  Referral Diagnosis: Low back pain  Referring provider: Mansi Mccracken MD  Visit Diagnosis:     ICD-10-CM    1. Chronic bilateral low back pain without sciatica M54.5     G89.29    2. Generalized muscle weakness M62.81        Assessment:      Pt. is appropriate for skilled PT intervention as outlined in the Plan of Care (POC).  Pt. is a good candidate for skilled PT services to improve pain levels and function.  Pt presents with low back pain with intermittent great toe symptoms on left.  Her trunk  AROM and left sided LE strength are decreased.  She will get great toe pain on the left but the more intense low back pain is on the right.  Feel pt will benefit from physical therapy to work on functional limitations    Goals:  Pt. 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    Barriers to Learning or Achieving Goals:  Obesity, HTN, hyperlipidemia, anxiety       Plan / Patient Instructions:        Plan of Care:   Authorization / Certification Start Date: 09/25/17  Authorization / Certification End Date: 12/20/17  Authorization / Certification Number of Visits: 8-12  Communication with: Referral Source  Patient Related Instruction: Nature of Condition;Treatment plan and rationale;Self Care instruction;Basis of treatment;Body mechanics;Posture  Times per Week: 2  Number of Weeks: 4-6  Number of Visits: 8-12  Therapeutic Exercise: ROM;Stretching;Strengthening  Neuromuscular  Reeducation: posture;kinesio tape;core  Manual Therapy: myofascial release  Modalities: electrical stimulation;ultrasound    Plan for next visit:  Intro stretches: low back, hamstring, nerve glide                                E-stim if helpful                                 Edu on posture, body mechanics                                 Strengthening hips, core     Subjective:         Social information:   Living Situation:single family home, lives with others  and stairs  with railing   Occupation:teacher     History of Present Illness:    Gia is a 51 y.o. female who presents to therapy today with complaints of low back pain. Date of onset/duration of symptoms is May 2017. Onset was gradual. Patient had the stomach flu and wonders if symptoms started while she was throwing up.  At that time she had symptoms into her left LE.  She will occasionally get pain in her left great toe.  That is when she first noticed the pain.  Symptoms are constant and not improving. Denies any imaging and has not hurt her back before.      Pain Ratin  Pain rating at best: 2  Pain rating at worst: 9  Pain description: burning, numbness, soreness, tingling and weakness, stiffness    Functional limitations are described as occurring with:   Sleep, lifting, sitting, standing, housework    Patient reports benefit from:  rest  .  , walking          Objective:      Note: Items left blank indicates the item was not performed or not indicated at the time of the evaluation.    Patient Outcome Measures :    Modified Oswestry Low Back Pain Disablity Questionnaire  in %: 36   Scores range from 0-100%, where a score of 0% represents minimal pain and maximal function. The minimal clinically important difference is a score reduction of 12%.    Examination  1. Chronic bilateral low back pain without sciatica     2. Generalized muscle weakness       Involved side: bilateral  Posture Observation:      General standing posture is fair.    Lumbar  ROM:         Within Normal Limits unless noted  Date: 9/25/17     *Indicate scale AROM AROM AROM   Lumbar Flexion Hands to mid thigh-pain     Lumbar Extension 75% limited, pain      Right Left Right Left Right Left   Lumbar Sidebending         Lumbar Rotation         Thoracic Flexion      Thoracic Extension      Thoracic Sidebending         Thoracic Rotation           Lower Extremity Strength:     Date: 9/25/17     LE strength/5 Right Left Right Left Right Left   Hip Flexion (L1-3) 5 4+       Hip Extension (L5-S1)         Hip Abduction (L4-5) 5 4+       Hip Adduction (L2-3) 4 4       Hip External Rotation         Hip Internal Rotation         Knee Extension (L3-4) 5 4       Knee Flexion 5 4       Ankle Dorsiflexion (L4-5)         Great Toe Extension (L5)         Ankle Plantar flexion (S1)         Abdominals        Sensation           Reflex Testing  Lumbar Dermatomes Right Left UE Reflexes Right Left   Iliac Crest and Groin (L1) - - Biceps (C5-6)     Anterior Medial Thigh (L2) - - Brachioradialis (C5-6)     Anterior Thigh, Medial Epicondyle Femur (L3) - - Triceps (C7-8)     Lateral Thigh, Anterior Knee, Medial Leg/Malleolus (L4) - - Ashok s test     Lateral Leg, Dorsal Foot (L5) - - LE Reflexes     Lateral Foot (S1)   Patellar (L3-4)  Slow response   Posterior Leg (S2)   Achilles (S1-2)     Other:   Babinski Response       Palpation: tender over right T7-S1, tender over left lumbar paraspinals, bilateral gluteius medius, bilateral piriformis, right SI joint  1 Leg Stance: R=11 seconds, L=15 seconds  Trendelenberg: + bilaterally  Squat: knees adduct, moderate toe out  Bridging: weakness demonstrated with quality of task  Leg length:=    Lumbar Special Tests:     Lumbar Special Tests Right Left SI Tests Right  Left   Quadrant test   SI Compression + -   Straight leg raise - - SI Distraction     Crossover response   POSH Test     Slump - - Sacral Thrust     Sit-up test  FADIR     Trunk extensor endurance test   Resisted Abduction     Prone instability test  Other:     Pubic shotgun  Other:         LE Screen:  did not reproduce any symptoms    Treatment Today     TREATMENT MINUTES COMMENTS   Evaluation 30 lumbar   Self-care/ Home management     Manual therapy     Neuromuscular Re-education     Therapeutic Activity     Therapeutic Exercises 10 Edu on DX, POC,    Gait training     Modality__________________           electrical stimulation 10+ 5 SET UP AND EXPLAINATION Pt prone with pillow under feet and hugging a pillow;  4 pads, IFC sweep, bilateral lumbar   Total 55    Blank areas are intentional and mean the treatment did not include these items.       PT Evaluation Code: (Please list factors)  Patient History/Comorbidities: obesity, hyperlipidemia, HTN, anxiety  Examination: decrease lumbar AROM, decrease strength, decrease functional activities-see above  Clinical Presentation: stable  Clinical Decision Making: low    Patient History/  Comorbidities Examination  (body structures and functions, activity limitations, and/or participation restrictions) Clinical Presentation Clinical Decision Making (Complexity)   No documented Comorbidities or personal factors 1-2 Elements Stable and/or uncomplicated Low   1-2 documented comorbidities or personal factor 3 Elements Evolving clinical presentation with changing characteristics Moderate   3-4 documented comorbidities or personal factors 4 or more Unstable and unpredictable High              Jacquelyn S Reina, PT  9/25/2017  3:03 PM

## 2021-06-13 NOTE — PROGRESS NOTES
[facility] Daily Progress     [facility] Discharge Summary  Patient Name: Gia Sultana  Date: 11/22/2017  Referral Diagnosis: Chronic Low Back Pain  Referring provider: Mansi Mccracken MD  Visit Diagnosis:   1. Chronic bilateral low back pain without sciatica     2. Generalized muscle weakness     3. Essential hypertension         Goals:  Pt. 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  patient was making good progress in therapy but then stopped coming in for appointments.    Therapy will be discontinued at this time.  The patient will need a new referral to resume.    Thank you for your referral.  Jacquelyn Huang  11/22/2017  11:32 AM    Patient Name: Gia Sultana  Date: 10/24/2017  Visit #: 5  Referral Diagnosis:  Low Back Pain  Referring provider: Mansi Mccracken MD  Visit Diagnosis:     ICD-10-CM    1. Chronic bilateral low back pain without sciatica M54.5     G89.29    2. Generalized muscle weakness M62.81    3. Essential hypertension I10          Assessment:     Patient is benefitting from skilled physical therapy and is making steady progress toward functional goals.  Patient is appropriate to continue with skilled physical therapy intervention, as indicated by initial plan of care.  Patient feels her back is starting to feel better which is allowing her to do more activities.  She was able to do activities at her parents, such as cleaning, that she hasn't been able to do since her back started hurting.    Goal  Status:  Pt. 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  Think the exercises are helping.  I even cleaned a refrigerator this weekend and it did not change any symptoms    Do not think I need the e-stim today  I did twist and bend to grab a milk which did increase my shooting pain.  I took a muscle relaxer and it feels good.    Objective:       Barriers to Learning or Achieving Goals:  Obesity, HTN, hyperlipidemia, anxiety    Functional limitations are described as occurring with:   Sleep, lifting, sitting, standing, housework    Exercises:  Exercise #1: supine nerve glide  Comment #1: 12-15 reps  Exercise #2: sitting hamstring stretch; LTR, SKC  Comment #2: 30 seconds X 1-3 reps  Exercise #3: midback and midback rotation  Comment #3: hold 30 seconds X 1-3 reps  Exercise #4: clamshells  Comment #4: 20  Exercise #5: bridges  Comment #5: hold 10 seconds X 6-12 reps  Exercise #6: prone hip ext  Comment #6: 20  Nu-Step   Seat 8, arms 9, workload 5     3'  SLR X 20 (work up to)  sidelying hip abd X 20    Treatment Today   Short appt secondary to pt late  TREATMENT MINUTES COMMENTS   Evaluation     Self-care/ Home management     Manual therapy     Neuromuscular Re-education     Therapeutic Activity      Therapeutic Exercises 15 See above or flow sheet   Gait training     Modality__________________           electrical stimulation Not today Pt supine; declined pillow under feet;  4 pads low lumbar, IFC sweep  With heat   Total 30    Blank areas are intentional and mean the treatment did not include these items.       Jacquelyn Huang, PT  10/24/2017

## 2021-06-13 NOTE — PROGRESS NOTES
[facility] Daily Progress     Patient Name: Gia Sultana  Date: 10/17/2017  Visit #: 4  Referral Diagnosis:  Low Back Pain  Referring provider: Mansi Mccracken MD  Visit Diagnosis:     ICD-10-CM    1. Chronic bilateral low back pain without sciatica M54.5     G89.29    2. Generalized muscle weakness M62.81          Assessment:     Patient is benefitting from skilled physical therapy and is making steady progress toward functional goals.  Patient is appropriate to continue with skilled physical therapy intervention, as indicated by initial plan of care.  Patient feels her back is starting to feel better which is allowing her to do more activities    Goal Status:  Pt. 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    Objective:     Barriers to Learning or Achieving Goals:  Obesity, HTN, hyperlipidemia, anxiety    Functional limitations are described as occurring with:   Sleep, lifting, sitting, standing, housework    Exercises:  Exercise #1: supine nerve glide  Comment #1: 12-15 reps  Exercise #2: sitting hamstring stretch; LTR, SKC  Comment #2: 30 seconds X 1-3 reps  Exercise #3: midback and midback rotation  Comment  #3: hold 30 seconds X 1-3 reps  Exercise #4: clamshells  Comment #4: 20  Exercise #5: bridges  Comment #5: hold 10 seconds X 6-12 reps  Exercise #6: prone hip ext  Comment #6: 20      Treatment Today     TREATMENT MINUTES COMMENTS   Evaluation     Self-care/ Home management     Manual therapy     Neuromuscular Re-education     Therapeutic Activity     Therapeutic Exercises 15 See above or flow sheet   Gait training     Modality__________________           electrical stimulation 15 Pt supine; declined pillow under feet;  4 pads low lumbar, IFC sweep  With heat   Total 30    Blank areas are intentional and mean the treatment did not include these items.       Jacquelyn Huang, PT  10/17/2017

## 2021-06-13 NOTE — PROGRESS NOTES
[facility] Daily Progress     Patient Name: Gia Sultana  Date: 10/13/2017  Visit #: 3  Referral Diagnosis:  Low Back Pain  Referring provider: Mansi Mccracken MD  Visit Diagnosis:     ICD-10-CM    1. Chronic bilateral low back pain without sciatica M54.5     G89.29    2. Generalized muscle weakness M62.81          Assessment:     Patient is benefitting from skilled physical therapy and is making steady progress toward functional goals.  Patient is appropriate to continue with skilled physical therapy intervention, as indicated by initial plan of care.  Patient will look into a 1/2 lumbar roll for work as the chairs she sits in are low and very hard.  She usually sits perched then her back gets sore.    Goal Status:  Pt. 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    Plan / Patient Education:                                     E-stim if helpful                                 Edu body mechanics                                 Strengthening hips, core      Subjective:     Pain Ratin    I feel good today.  I have been doing all the exercises except the one on my back..  It just hurts to lay on my back like that.  It always has.  My area at work had an mini ergonomic assessment.  My table was way too low so they are working on getting me more ergonomic tables and chair.    Objective:     Barriers to Learning or Achieving Goals:  Obesity, HTN, hyperlipidemia, anxiety    Functional limitations are described as occurring with:   Sleep, lifting, sitting, standing,  housework    Exercises:  Exercise #1: supine nerve glide  Comment #1: 12-15 reps  Exercise #2: sitting hamstring stretch; LTR, SKC  Comment #2: 30 seconds X 1-3 reps  Exercise #3: midback and midback rotation  Comment #3: hold 30 seconds X 1-3 reps  Posture edu for sitting and standing, 1/2 lumbar roll    Treatment Today     TREATMENT MINUTES COMMENTS   Evaluation     Self-care/ Home management     Manual therapy     Neuromuscular Re-education     Therapeutic Activity     Therapeutic Exercises 15 ; posture edu see above   Gait training     Modality__________________           electrical stimulation 15 Pt supine; declined pillow under feet;  4 pads low lumbar, IFC sweep  With heat   Total 30    Blank areas are intentional and mean the treatment did not include these items.       Jacquelyn Huang, PT  10/13/2017

## 2021-06-13 NOTE — PROGRESS NOTES
[facility]    CHIEF COMPLAINT/REASON FOR VISIT:  Chief Complaint   Patient presents with     Pain     back and side pain under ribs x 3 days       HISTORY:      HPI: Gia is a 54 y.o. female who  has a past medical history of Cholelithiasis, Depression, GERD (gastroesophageal reflux disease), and Hypertension. She also has no past medical history of Alcoholism (H), Breast mass, Chronic kidney disease, Clotting disorder (H), Deep vein thrombosis (H), Diabetes mellitus (H), Disease of thyroid gland, Fatty liver, Gout, HIV disease (H), Infectious viral hepatitis, Seizures (H), Sleep apnea, Substance abuse (H), Thyroid nodule, or Tuberculosis.    Today Gia is being evaluated for right flank pain.  She states that she initially started with illness with sore throat, cough, abdominal discomfort, body aches on Thursday.  She states that these symptoms lasted until Sunday.  She states now the symptoms are mostly much better.  But she started having right flank pain.  She states that she does have polycystic kidney disease.  She is worried that something is going on.  We did discuss other symptoms including problems with nausea, vomiting.  However she has not had any of these issues.  She states she has not had any hematuria, no dysuria.  She is agreeable to a work-up.  Gia denies any other concerns including fevers/chills, cough or cold symptoms, headaches, vision changes, chest pain/pressure, difficulty breathing, SOB, abdominal pain, nausea, vomiting, diarrhea, dysuria, increasing weakness, increasing pain.      Past Medical History:   Diagnosis Date     Cholelithiasis      Depression      GERD (gastroesophageal reflux disease)      Hypertension              Family History   Problem Relation Age of Onset     Diabetes Mother      Obesity Mother      Dementia Father      Diabetes Brother      Obesity Brother      Social History     Socioeconomic History     Marital status:      Spouse name: None      "Number of children: None     Years of education: None     Highest education level: None   Occupational History     None   Social Needs     Financial resource strain: None     Food insecurity     Worry: None     Inability: None     Transportation needs     Medical: None     Non-medical: None   Tobacco Use     Smoking status: Current Some Day Smoker     Packs/day: 0.25     Types: Cigarettes     Smokeless tobacco: Never Used   Substance and Sexual Activity     Alcohol use: Yes     Alcohol/week: 0.0 - 5.0 standard drinks     Drug use: No     Sexual activity: Yes     Partners: Male     Comment: vasectomy in    Lifestyle     Physical activity     Days per week: None     Minutes per session: None     Stress: None   Relationships     Social connections     Talks on phone: None     Gets together: None     Attends [employer] service: None     Active member of club or organization: None     Attends meetings of clubs or organizations: None     Relationship status: None     Intimate partner violence     Fear of current or ex partner: None     Emotionally abused: None     Physically abused: None     Forced sexual activity: None   Other Topics Concern     None   Social History Narrative     None       REVIEW OF SYSTEM:  Per HPI    PHYSICAL EXAM:   BP (!) 161/92 (Patient Site: Right Arm, Patient Position: Sitting, Cuff Size: Adult Large)   Pulse 100   Ht 5' 2\" (1.575 m)   Wt (!) 251 lb (113.9 kg)   SpO2 97%   BMI 45.91 kg/m      A limited exam was performed due to recommendations for care during COVID-19 pandemic. Due to the 2020 COVID-19 pandemic, except as noted above, the patient was visually observed at a 6 foot plus distance.  An observational exam was performed in an effort to keep patient safe from COVID-19 and other communicable diseases.     General appearance: alert, appears stated age and cooperative  HEENT: Head is normocephalic with normal hair distribution. No evidence of trauma. Ears: Without lesions or " deformity. No acute purulent discharge. Eyes: Conjunctivae pink with no scleral icterus or erythema. Nose: Normal. Oropharnyx: mmm.  Lungs: respirations without effort.  Extremities: extremities normal, atraumatic, no cyanosis.  Skin: Skin color, texture normal. No rashes or lesions on exposed skin.   Neurologic: Grossly normal   Psych: interacts well with caregivers, exhibits logical thought processes and connections, pleasant.      LABS:   CBC, BMP, COVID-19, UA/UC.     ASSESSMENT:      ICD-10-CM    1. Sore throat  J02.9 Symptomatic COVID-19 Virus (CORONAVIRUS) PCR   2. Flank pain  R10.9 XR Abdomen AP     HM1(CBC and Differential)     LTD-Basic Metabolic Panel     Urinalysis-UC if Indicated       PLAN:    Sore throat  -COVID-19 Swab.     Flank Pain  -KUB negative.   -CBC, UA negative.   -Awaiting BMP.   -Will call with results.     All questions answered to patient's satisfaction. No further questions posed, no comments or issues to report. Patient advised to return to primary care provider, urgent care or ER with any further complaints, issues or concerns.    Electronically signed by: Elza Matthews CNP

## 2021-06-13 NOTE — PROGRESS NOTES
[facility] Daily Progress     Patient Name: Gia Sultana  Date: 10/10/2017  Visit #: 2  Referral Diagnosis:  Low Back Pain  Referring provider: Mansi Mccracken MD  Visit Diagnosis:     ICD-10-CM    1. Chronic bilateral low back pain without sciatica M54.5     G89.29    2. Generalized muscle weakness M62.81          Assessment:     Patient is benefitting from skilled physical therapy and is making steady progress toward functional goals.  Patient is appropriate to continue with skilled physical therapy intervention, as indicated by initial plan of care.  Patient's back was feeling little pain, but once she laid on treatment table the back pain started to increase.  Was not able to position patient so she could perform stretches comfortably.  Where she was familiar with the stretches, the therapist demonstrated each stretch and gave pictures for all.  Patient thought this would work to remind her what and how to do each one.    Goal Status:  Pt. 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, within 4-6 weeks    Plan / Patient Education:     Intro stretches: low back, hamstring, nerve glide-review                                E-stim if helpful                                 Edu on posture, body mechanics                                 Strengthening hips, core      Subjective:     Pain Ratin    I feel good today, but I do have good and bad days  The next day, after my first treatment, my back still felt good from the e-stim      Objective:     Barriers to Learning or Achieving Goals:  Obesity, HTN, hyperlipidemia, anxiety    Functional  limitations are described as occurring with:   Sleep, lifting, sitting, standing, housework    Exercises:  Exercise #1: supine nerve glide  Comment #1: 12-15 reps  Exercise #2: sitting hamstring stretch; LTR, SKC  Comment #2: 30 seconds X 1-3 reps  Exercise #3: midback and midback rotation  Comment #3: hold 30 seconds X 1-3 reps    Treatment Today     TREATMENT MINUTES COMMENTS   Evaluation     Self-care/ Home management     Manual therapy     Neuromuscular Re-education     Therapeutic Activity     Therapeutic Exercises 15 See above or flow sheet   Gait training     Modality__________________           electrical stimulation 15 Pt supine; declined pillow under feet;  4 pads low lumbar, IFC sweep  With heat   Total 30    Blank areas are intentional and mean the treatment did not include these items.       Jacquelyn uHang, PT  10/10/2017

## 2021-06-13 NOTE — PROGRESS NOTES
Name: Gia Sultana  Age: 51 y.o.  Gender: female  : 1965  Date of Encounter: 2017      HPI:  Gia Sultana is a 51 y.o.  female who presents to the clinic with concerns of possible strep infection.  Patient reports sore throat and headache for the last 2 days.  Last night felt nauseated but no vomiting.  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  Denies fever, chills, ear pain, vomiting and skin rash.  Patient has history of strep pharyngitis infections.    Current Medication:   Medications reviewed and updated.    Current Outpatient Prescriptions:      aspirin 81 MG EC tablet, Take 81 mg by mouth daily., Disp: , Rfl:      baclofen (LIORESAL) 10 MG tablet, Take 1 tablet (10 mg total) by mouth 3 (three) times a day., Disp: 30 tablet, Rfl: 1     cetirizine (ZYRTEC) 10 mg cap, Take by mouth., Disp: , Rfl:      lisinopril-hydrochlorothiazide (PRINZIDE,ZESTORETIC) 10-12.5 mg per tablet, Take 1 tablet by mouth daily., Disp: 90 tablet, Rfl: 0     omeprazole (PRILOSEC) 20 MG capsule, Take by mouth daily., Disp: , Rfl:      venlafaxine (EFFEXOR-XR) 75 MG 24 hr capsule, TAKE ONE CAPSULES BY MOUTH DAILY, Disp: 90 capsule, Rfl: 3    Past Med / Surg History:  Past Medical History:   Diagnosis Date     Cholelithiasis      Depression      GERD (gastroesophageal reflux disease)      Hypertension      Past Surgical History:   Procedure Laterality Date      SECTION, CLASSIC       CHOLECYSTECTOMY       COLONOSCOPY      one polyp removed.     [place]  DELIVERY ONLY      Description:  Section;  Recorded: 10/16/2008;  Comments: x2     [place] LAP,CHOLECYSTECTOMY      Description: Cholecystectomy Laparoscopic;  Recorded: 04/15/2008;     [place] REVISN TRACHEA,CERVICAL      Description: Tracheoplasty Cervical;  Recorded: 10/16/2008;  Comments: Trach inserted as a child       Fam / Soc History:  Family History   Problem Relation Age of Onset      Diabetes Mother      Obesity Mother      Dementia Father      Diabetes Brother      Obesity Brother      Social History     Social History     Marital status:      Spouse name: N/A     Number of children: N/A     Years of education: N/A     Occupational History     Not on file.     Social History Main Topics     Smoking status: Current Some Day Smoker     Packs/day: 0.25     Types: Cigarettes     Smokeless tobacco: Never Used     Alcohol use 0.0 - 3.0 oz/week     0 - 5 Shots of liquor per week     Drug use: No     Sexual activity: Yes     Partners: Male      Comment: vasectomy in      Other Topics Concern     Not on file     Social History Narrative       ROS:  14 point review of systems unremarkable except as mentioned in HPI    Objective:  Vitals: /78  Pulse 92  Temp 98.1  F (36.7  C) (Oral)   Resp 14  Wt (!) 239 lb (108.4 kg)  SpO2 97%  Breastfeeding? No  BMI 45.16 kg/m2    Gen: Alert, awake, well appearing  HEENT: NC, AT,   Ears:  Ear canals clear.  TMs normal appearing.  Eyes:  EOMI.  Pupils equally round and reactive to light. Conjunctivae clear.  Sclera non-icteric.  Nose:  Nasal mucosa pink, septum midline.  No sinus tenderness  Mouth:  MMM. Oropharynx erythematous. No tonsillar exudate. Teeth, gum, tongue and lips clear.  Neck:  Supple, FROM, No lymphandenpathy, No TM  Heart: Regular rate and rhythm; normal S1 and S2; no murmurs, gallops, or rubs.  Peripheral Vessels: Normal pulses and perfusion.  Lungs: Unlabored respirations; symmetric chest expansion; clear breath sounds.  Skin: Normal turgor and without lesions or rashes.  Mental Status: Alert, oriented, in no distress. Mood and affect appropriate.    Pertinent results / imaging:  Results for orders placed or performed in visit on 09/25/17   Rapid Strep A Screen-Throat   Result Value Ref Range    Rapid Strep A Antigen No Group A Strep detected, presumptive negative No Group A Strep detected, presumptive negative        Assessment:  pharyngitis    Plan: Reviewed negative RST test with patient and informed follow PCR strep test is pending.  She will be notified of results if positive.  If follow-up test is negative most probably viral illness.  Advised fluids, rest, humidified air, Tylenol or ibuprofen for fever and discomfort or other over-the-counter medications as needed for symptom relief.  Reviewed expected course, duration of symptoms and reasons to return for reevaluation.  She voiced understanding and was in agreement with plan.      Tamar Gibbons MD  9/25/2017

## 2021-06-16 PROBLEM — K76.89 HEPATIC CYST: Status: ACTIVE | Noted: 2018-03-07

## 2021-06-16 PROBLEM — N28.1 RENAL CYST: Status: ACTIVE | Noted: 2018-03-07

## 2021-06-16 NOTE — PROGRESS NOTES
Assessment/Plan:     1. LLQ pain  Concerned about diverticulitis.  This is her third episode within the past calendar year.  Will obtain hemogram.  Will image with CT scan to confirm diagnosis.  If diverticulitis present, plan to treat with course of ciprofloxacin and metronidazole for 10 days along with referral to gastroenterology given recurrence.  Advised initially to clear liquids only, advancing when pain is improving.  Prescription provided for Vicodin to sparingly as needed for pain.  Counseling provided regarding narcotic pain medication.  - HM1(CBC and Differential)  - HM1 (CBC with Diff)  - CT Abdomen Pelvis With Oral With IV Contrast; Future      Subjective:      Gia Sultana is a 52 y.o. female presented to clinic today for evaluation of left lower quadrant pain present for the past week.  Notes that her bowel movements are smaller volume though no firmness or diarrhea initially, tried a laxative thinking she was constipated but that resulted in diarrhea without relief.  A constant feeling of needing to have a bowel movement.  No fevers.  Feeling a little more chilled than some mild nausea with reduced appetite.  Feel she staying hydrated.  Denies any vomiting.  Prior history of diverticulitis initially confirmed in 2013.  She has had at least 3 prior bouts of this including May 2016, May 2017, and she believes she had an episode in the fall as well though I have no record of this.  She has not had any imaging since the initial test.  She had a colonoscopy 2013 that indicated a benign polyp but was otherwise unremarkable other than diverticulosis.  No other new exposures.  Menses have started, symptoms preceded menses and are not typical with her menses.    Current Outpatient Prescriptions   Medication Sig Dispense Refill     aspirin 81 MG EC tablet Take 81 mg by mouth daily.       baclofen (LIORESAL) 10 MG tablet Take 1 tablet (10 mg total) by mouth 3 (three) times a day. (Patient taking  differently: Take 10 mg by mouth as needed. ) 30 tablet 1     cetirizine (ZYRTEC) 10 mg cap Take by mouth.       lisinopril-hydrochlorothiazide (PRINZIDE,ZESTORETIC) 10-12.5 mg per tablet TAKE 1 TABLET BY MOUTH DAILY. 90 tablet 2     omeprazole (PRILOSEC) 20 MG capsule Take by mouth daily.       venlafaxine (EFFEXOR-XR) 75 MG 24 hr capsule TAKE ONE CAPSULES BY MOUTH DAILY 90 capsule 3     HYDROcodone-acetaminophen 5-325 mg per tablet Take 1 tablet by mouth every 4 (four) hours as needed for pain. 20 tablet 0     No current facility-administered medications for this visit.        Past Medical History, Family History, and Social History reviewed.  Past Medical History:   Diagnosis Date     Cholelithiasis      Depression      GERD (gastroesophageal reflux disease)      Hypertension      Past Surgical History:   Procedure Laterality Date      SECTION, CLASSIC       CHOLECYSTECTOMY       COLONOSCOPY      one polyp removed.     [place]  DELIVERY ONLY      Description:  Section;  Recorded: 10/16/2008;  Comments: x2     [place] LAP,CHOLECYSTECTOMY      Description: Cholecystectomy Laparoscopic;  Recorded: 04/15/2008;     [place] REVISN TRACHEA,CERVICAL      Description: Tracheoplasty Cervical;  Recorded: 10/16/2008;  Comments: Trach inserted as a child     Penicillins; Sulfa (sulfonamide antibiotics); and Nexium [esomeprazole magnesium]  Family History   Problem Relation Age of Onset     Diabetes Mother      Obesity Mother      Dementia Father      Diabetes Brother      Obesity Brother      Social History     Social History     Marital status:      Spouse name: N/A     Number of children: N/A     Years of education: N/A     Occupational History     Not on file.     Social History Main Topics     Smoking status: Current Some Day Smoker     Packs/day: 0.25     Types: Cigarettes     Smokeless tobacco: Never Used     Alcohol use 0.0 - 3.0 oz/week     0 - 5 Shots of liquor per week     Drug use: No     Sexual  "activity: Yes     Partners: Male      Comment: vasectomy in      Other Topics Concern     Not on file     Social History Narrative         Review of systems is as stated in HPI, and the remainder of the 10 system review is otherwise negative.    Objective:     Vitals:    03/06/18 1101   BP: 122/78   Patient Site: Left Arm   Patient Position: Sitting   Cuff Size: Adult Large   Pulse: 95   Resp: 24   Temp: 98.1  F (36.7  C)   TempSrc: Oral   SpO2: 97%   Weight: (!) 238 lb 11.2 oz (108.3 kg)   Height: 5' 1\" (1.549 m)    Body mass index is 45.1 kg/(m^2).    Alert female.  Mucous membranes moist.  Abdomen is soft.  She has significant tenderness and no overt rebound but significantly moderate tenderness left lower quadrant.  No palpable masses.  Requires assistance to sit up on the exam table with significant grimace.      This note has been dictated using voice recognition software. Any grammatical or context distortions are unintentional and inherent to the the software.   "

## 2021-06-16 NOTE — TELEPHONE ENCOUNTER
RN cannot approve Refill Request    RN can NOT refill this medication PCP messaged that patient is overdue for Labs. Last office visit: 1/15/2020 Mansi Mccracken MD Last Physical: Visit date not found Last MTM visit: Visit date not found Last visit same specialty: 1/15/2020 Mansi Mccracken MD.  Next visit within 3 mo: Visit date not found  Next physical within 3 mo: Visit date not found      Helen Garcia, [facility] Triage/Med Refill 3/29/2021    Requested Prescriptions   Pending Prescriptions Disp Refills     omeprazole (PRILOSEC) 20 MG capsule [Pharmacy Med Name: OMEPRAZOLE DR 20 MG CAPSULE] 90 capsule 1     Sig: TAKE 1 CAPSULE BY MOUTH EVERY DAY       GI Medications Refill Protocol Failed - 3/29/2021 11:37 AM        Failed - PCP or prescribing provider visit in last 12 or next 3 months.     Last office visit with prescriber/PCP: 1/15/2020 Mansi Mccracken MD OR same dept: Visit date not found OR same specialty: 1/15/2020 Mansi Mccracken MD  Last physical: Visit date not found Last MTM visit: Visit date not found   Next visit within 3 mo: Visit date not found  Next physical within 3 mo: Visit date not found  Prescriber OR PCP: Mansi Mccracken MD  Last diagnosis associated with med order: 1. Gastroesophageal reflux disease without esophagitis  - omeprazole (PRILOSEC) 20 MG capsule [Pharmacy Med Name: OMEPRAZOLE DR 20 MG CAPSULE]; TAKE 1 CAPSULE BY MOUTH EVERY DAY  Dispense: 90 capsule; Refill: 1    2. Hypomagnesemia  - magnesium oxide (MAG-OX) 400 mg (241.3 mg magnesium) tablet [Pharmacy Med Name: MAGNESIUM OXIDE 400 MG TABLET]; TAKE 1 TABLET BY MOUTH EVERY DAY  Dispense: 120 tablet; Refill: 3    3. Generalized anxiety disorder  - venlafaxine (EFFEXOR-XR) 75 MG 24 hr capsule [Pharmacy Med Name: VENLAFAXINE HCL ER 75 MG CAP]; TAKE 1 CAPSULE BY MOUTH EVERY DAY  Dispense: 90 capsule; Refill: 0    If protocol passes may refill for 12 months if within 3 months of last provider visit (or a  total of 15 months).                magnesium oxide (MAG-OX) 400 mg (241.3 mg magnesium) tablet [Pharmacy Med Name: MAGNESIUM OXIDE 400 MG TABLET] 120 tablet 3     Sig: TAKE 1 TABLET BY MOUTH EVERY DAY       There is no refill protocol information for this order        venlafaxine (EFFEXOR-XR) 75 MG 24 hr capsule [Pharmacy Med Name: VENLAFAXINE HCL ER 75 MG CAP] 90 capsule 0     Sig: TAKE 1 CAPSULE BY MOUTH EVERY DAY       Venlafaxine/Desvenlafaxine Refill Protocol Failed - 3/29/2021 11:37 AM        Failed - LFT or AST or ALT in last year     Albumin   Date Value Ref Range Status   01/15/2020 4.2 3.5 - 5.0 g/dL Final     Bilirubin, Total   Date Value Ref Range Status   01/15/2020 0.3 0.0 - 1.0 mg/dL Final     Bilirubin, Direct   Date Value Ref Range Status   04/22/2013 <0.1 <0.6 mg/dL Final     Alkaline Phosphatase   Date Value Ref Range Status   01/15/2020 89 45 - 120 U/L Final     AST   Date Value Ref Range Status   01/15/2020 19 0 - 40 U/L Final     ALT   Date Value Ref Range Status   01/15/2020 23 0 - 45 U/L Final     Protein, Total   Date Value Ref Range Status   01/15/2020 7.4 6.0 - 8.0 g/dL Final                Failed - Fasting lipid cascade in last year     Cholesterol   Date Value Ref Range Status   01/15/2020 254 (H) <=199 mg/dL Final     Triglycerides   Date Value Ref Range Status   01/15/2020 168 (H) <=149 mg/dL Final     HDL Cholesterol   Date Value Ref Range Status   01/15/2020 59 >=50 mg/dL Final     LDL Calculated   Date Value Ref Range Status   01/15/2020 161 (H) <=129 mg/dL Final     Patient Fasting > 8hrs?   Date Value Ref Range Status   01/15/2020 Yes  Final             Failed - PCP or prescribing provider visit in last year     Last office visit with prescriber/PCP: 1/15/2020 Mansi Mccracken MD OR same dept: Visit date not found OR same specialty: 1/15/2020 Mansi Mccracken MD  Last physical: Visit date not found Last MTM visit: Visit date not found   Next visit within 3 mo: Visit date  not found  Next physical within 3 mo: Visit date not found  Prescriber OR PCP: Mansi Mccracken MD  Last diagnosis associated with med order: 1. Gastroesophageal reflux disease without esophagitis  - omeprazole (PRILOSEC) 20 MG capsule [Pharmacy Med Name: OMEPRAZOLE DR 20 MG CAPSULE]; TAKE 1 CAPSULE BY MOUTH EVERY DAY  Dispense: 90 capsule; Refill: 1    2. Hypomagnesemia  - magnesium oxide (MAG-OX) 400 mg (241.3 mg magnesium) tablet [Pharmacy Med Name: MAGNESIUM OXIDE 400 MG TABLET]; TAKE 1 TABLET BY MOUTH EVERY DAY  Dispense: 120 tablet; Refill: 3    3. Generalized anxiety disorder  - venlafaxine (EFFEXOR-XR) 75 MG 24 hr capsule [Pharmacy Med Name: VENLAFAXINE HCL ER 75 MG CAP]; TAKE 1 CAPSULE BY MOUTH EVERY DAY  Dispense: 90 capsule; Refill: 0    If protocol passes may refill for 12 months if within 3 months of last provider visit (or a total of 15 months).             Passed - Blood Pressure in last year     BP Readings from Last 1 Encounters:   11/16/20 (!) 161/92

## 2021-06-17 NOTE — TELEPHONE ENCOUNTER
RN cannot approve Refill Request    RN can NOT refill this medication PCP messaged that patient is overdue for Office Visit. Last office visit: 1/15/2020 Mansi Mccracken MD Last Physical: Visit date not found Last MTM visit: Visit date not found Last visit same specialty: 1/15/2020 Mansi Mccracken MD.  Next visit within 3 mo: Visit date not found  Next physical within 3 mo: Visit date not found      Helen Garcia, [facility] Triage/Med Refill 5/2/2021    Requested Prescriptions   Pending Prescriptions Disp Refills     lisinopriL-hydrochlorothiazide (PRINZIDE,ZESTORETIC) 10-12.5 mg per tablet [Pharmacy Med Name: LISINOPRIL-HCTZ 10-12.5 MG TAB] 90 tablet 3     Sig: TAKE 1 TABLET BY MOUTH EVERY DAY       Diuretics/Combination Diuretics Refill Protocol  Failed - 5/2/2021  9:09 AM        Failed - Visit with PCP or prescribing provider visit in past 12 months     Last office visit with prescriber/PCP: 1/15/2020 Mansi Mccracken MD OR same dept: Visit date not found OR same specialty: 1/15/2020 Mansi Mccracken MD  Last physical: Visit date not found Last MTM visit: Visit date not found   Next visit within 3 mo: Visit date not found  Next physical within 3 mo: Visit date not found  Prescriber OR PCP: Mansi Mccracken MD  Last diagnosis associated with med order: 1. Essential hypertension, malignant  - lisinopriL-hydrochlorothiazide (PRINZIDE,ZESTORETIC) 10-12.5 mg per tablet [Pharmacy Med Name: LISINOPRIL-HCTZ 10-12.5 MG TAB]; TAKE 1 TABLET BY MOUTH EVERY DAY  Dispense: 90 tablet; Refill: 3    If protocol passes may refill for 12 months if within 3 months of last provider visit (or a total of 15 months).             Passed - Serum Potassium in past 12 months      Lab Results   Component Value Date    Potassium 4.3 11/16/2020             Passed - Serum Sodium in past 12 months      Lab Results   Component Value Date    Sodium 138 11/16/2020             Passed - Blood pressure on file in past 12  months     BP Readings from Last 1 Encounters:   11/16/20 (!) 161/92             Passed - Serum Creatinine in past 12 months      Creatinine   Date Value Ref Range Status   11/16/2020 0.82 0.60 - 1.10 mg/dL Final

## 2021-06-18 NOTE — PATIENT INSTRUCTIONS - HE
Patient Instructions by Elza Matthews CNP at 11/16/2020  3:10 PM     Author: Elza Matthews CNP Service: -- Author Type: Nurse Practitioner    Filed: 11/16/2020  4:32 PM Encounter Date: 11/16/2020 Status: Signed    : Elza Matthews CNP (Nurse Practitioner)       Patient Education     Flank Pain, Uncertain Cause  The flank is the area between your upper abdomen and your back. Pain there is often caused by a problem with your kidneys. It might be a kidney infection or a kidney stone. Other causes of flank pain include spinal arthritis, a pinched nerve from a back injury, or a back muscle strain or spasm.  The cause of your flank pain is not certain. You may need other tests.  Home care  Follow these tips when caring for yourself at home:    You may use acetaminophen or ibuprofen to control pain, unless your health care provider prescribed another medicine. If you have chronic liver or kidney disease, talk with your provider before taking these medicines. Also talk with your provider first if youve ever had a stomach ulcer or GI bleeding.    If the pain is coming from your muscles, you may get relief with ice or heat. During the first 2 days after the injury, put an ice pack on the painful area for 20 minutes every 2 to 4 hours. This will reduce swelling and pain. A hot shower, hot bath, or heating pad works well for a muscle spasm. You can start with ice, then switch to heat after 2 days. You might find that alternating ice and heat works well. Use the method that feels the best to you.  Follow-up care  Follow up with your healthcare provider if your symptoms dont get better over the next few days.  When to seek medical advice  Call your healthcare provider right away if any of these happen:    Repeated vomiting    Fever of 100.4 F (38 C) or higher, or as directed by your health care provider    Flank pain that gets worse    Pain that spreads to the front of your belly (abdomen)    Dizziness, weakness, or  fainting    Blood in your urine    Burning feeling when you urinate or the need to urinate often    Pain in one of your legs that gets worse    Numbness or weakness in a leg  Date Last Reviewed: 10/1/2016    9603-0894 [employer]. [address] [place] [address], [place] [address]. All rights reserved. This information is not intended as a substitute for professional medical care. Always follow your healthcare professional's instructions.           COVID-19 Test pending.   Will call with results of other testing.

## 2021-06-19 NOTE — PROGRESS NOTES
[facility]     SUBJECTIVE: Gia Sultana is a 52 y.o. female who presents today with left knee pain.    She says her symptoms started 9 days ago with pain in her left knee.  She says she had a pedicure the day before and they were massaging her leg on that side and it was quite painful at the time.  She says her knee was swollen for a while and it was painful to walk.  She says stairs have especially been painful.  She just got back from vacation in [place] and had a rough time there due to the pain.  No locking, catching, or falls.  No other injury she can remember.  No numbness.  She has been taking Advil and muscle relaxers without relief.  She has also been icing her knee which helped with the swelling.     ROS:   - As above     PMH:   Past Medical History:   Diagnosis Date     Cholelithiasis      Depression      GERD (gastroesophageal reflux disease)      Hypertension            OBJECTIVE:    Vitals: /60 (Patient Site: Right Arm, Patient Position: Sitting, Cuff Size: Adult Large)  Pulse 96  Temp 98.4  F (36.9  C) (Oral)   Resp 20  LMP  (LMP Unknown)  SpO2 96%  Breastfeeding? No   BMI= There is no height or weight on file to calculate BMI.    General: Sitting on exam table, NAD  HEENT: Atraumatic, PERRL  Neck: Supple  Ext: External knees are normal to visual inspection.  Left knee has joint line tenderness medially and laterally.  Normal range of motion of the left knee.  Negative varus/valgus stress.  Negative anterior/posterior drawer.  Negative Radha's.  No edema.  Neuro: 5 out of 5 strength throughout bilateral lower extremities, normal sensation, 2+ patellar and Achilles reflexes.  Antalgic gait.  Psych: Calm, normal affect    Personally reviewed images/impressions.  XR KNEE LEFT PLUS SUNRISE VW - 3 VIEWS TOTAL  7/20/2018 2:31 PM     INDICATION: Left knee pain.  COMPARISON: None.     FINDINGS: Negative knee. No fracture or dislocation. No joint effusion.    ASSESSMENT AND PLAN:    Gia Sultana is a 52 y.o. female who presents today with left knee pain; in stable condition.     1. Acute pain of left knee  -Acute pain on and off for the past 9 days.  Seems to have localizing pain to her knee joint with focal tenderness on exam.  Her x-rays are negative for acute findings, but in my review do show some mild medial compartment narrowing which is where she had some of her tenderness on exam.  After discussing options, we opted to treat this like knee osteoarthritis.  Will have her try rest, ice/heat as needed, and will try scheduled NSAIDs such as diclofenac.  Discussed not using other NSAIDs while on this medicine.  I told her is located continue her muscle relaxer as needed as well.  Return precautions discussed.  - diclofenac (VOLTAREN) 75 MG EC tablet; Take 1 tablet (75 mg total) by mouth 2 (two) times a day as needed.  Dispense: 45 tablet; Refill: 0    Options for treatment and/or follow-up care were reviewed with the patient and/or guardian. Gia Sultana and/or guardian was engaged and actively involved in the decision making process. She and/or guardian verbalized understanding of the options discussed and was satisfied with the final plan.    Follow up above with PCP in 1 to 2 weeks if no improvement or sooner if develops new or worsening symptoms.    Eduardo Moreno, DO    This note was created with help of Dragon dictation system. Grammatical /typing errors are not intentional.

## 2021-06-19 NOTE — LETTER
Letter by Mansi Mccracken MD at      Author: Mansi Mccracken MD Service: -- Author Type: --    Filed:  Encounter Date: 12/9/2019 Status: Signed         Gia Sultana  [address] [place] [address]      December 9, 2019      Dear Gia Sultana,    Per our refill protocol, you are due for a medication check office visit. Your prescription for Effexor was sent to your pharmacy (12.5.19). Please call [phone] to schedule an office visit/Physical  with Dr. Mccracken before your next refill is needed to avoid delays.    Thank you,  [facility]

## 2021-06-20 NOTE — LETTER
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  She has no other activity restrictions.    If you have any questions or concerns, please don't hesitate to call.    Sincerely,        Electronically signed by Mansi Mccracken MD        Well  - 9 Years Old  SOCIAL AND EMOTIONAL DEVELOPMENT  Your 9-year-old:  · Will continue to develop stronger relationships with friends. Your child may begin to identify much more closely with friends than with you or family members.  · May experience increased peer pressure. Other children may influence your child's actions.  · May feel stress in certain situations (such as during tests).  · Shows increased awareness of his or her body. He or she may show increased interest in his or her physical appearance.  · Can better handle conflicts and problem solve.  · May lose his or her temper on occasion (such as in stressful situations).  ENCOURAGING DEVELOPMENT  · Encourage your child to join play groups, sports teams, or after-school programs, or to take part in other social activities outside the home.    · Do things together as a family, and spend time one-on-one with your child.  · Try to enjoy mealtime together as a family. Encourage conversation at mealtime.    · Encourage your child to have friends over (but only when approved by you). Supervise his or her activities with friends.    · Encourage regular physical activity on a daily basis. Take walks or go on bike outings with your child.  · Help your child set and achieve goals. The goals should be realistic to ensure your child's success.      · Limit television and video game time to 1-2 hours each day. Children who watch television or play video games excessively are more likely to become overweight. Monitor the programs your child watches. Keep video games in a family area rather than your child's room. If you have cable, block channels that are not acceptable for young children.  RECOMMENDED IMMUNIZATIONS   · Hepatitis B vaccine. Doses of this vaccine may be obtained, if needed, to catch up on missed doses.  · Tetanus and diphtheria toxoids and acellular pertussis (Tdap) vaccine. Children 7 years old and older who are not fully immunized with  diphtheria and tetanus toxoids and acellular pertussis (DTaP) vaccine should receive 1 dose of Tdap as a catch-up vaccine. The Tdap dose should be obtained regardless of the length of time since the last dose of tetanus and diphtheria toxoid-containing vaccine was obtained. If additional catch-up doses are required, the remaining catch-up doses should be doses of tetanus diphtheria (Td) vaccine. The Td doses should be obtained every 10 years after the Tdap dose. Children aged 7-10 years who receive a dose of Tdap as part of the catch-up series should not receive the recommended dose of Tdap at age 11-12 years.  · Pneumococcal conjugate (PCV13) vaccine. Children with certain conditions should obtain the vaccine as recommended.  · Pneumococcal polysaccharide (PPSV23) vaccine. Children with certain high-risk conditions should obtain the vaccine as recommended.  · Inactivated poliovirus vaccine. Doses of this vaccine may be obtained, if needed, to catch up on missed doses.  · Influenza vaccine. Starting at age 6 months, all children should obtain the influenza vaccine every year. Children between the ages of 6 months and 8 years who receive the influenza vaccine for the first time should receive a second dose at least 4 weeks after the first dose. After that, only a single annual dose is recommended.  · Measles, mumps, and rubella (MMR) vaccine. Doses of this vaccine may be obtained, if needed, to catch up on missed doses.  · Varicella vaccine. Doses of this vaccine may be obtained, if needed, to catch up on missed doses.  · Hepatitis A vaccine. A child who has not obtained the vaccine before 24 months should obtain the vaccine if he or she is at risk for infection or if hepatitis A protection is desired.  · HPV vaccine. Individuals aged 11-12 years should obtain 3 doses. The doses can be started at age 9 years. The second dose should be obtained 1-2 months after the first dose. The third dose should be obtained 24  weeks after the first dose and 16 weeks after the second dose.  · Meningococcal conjugate vaccine. Children who have certain high-risk conditions, are present during an outbreak, or are traveling to a country with a high rate of meningitis should obtain the vaccine.  TESTING  Your child's vision and hearing should be checked. Cholesterol screening is recommended for all children between 9 and 11 years of age. Your child may be screened for anemia or tuberculosis, depending upon risk factors. Your child's health care provider will measure body mass index (BMI) annually to screen for obesity. Your child should have his or her blood pressure checked at least one time per year during a well-child checkup.  If your child is female, her health care provider may ask:  · Whether she has begun menstruating.  · The start date of her last menstrual cycle.  NUTRITION  · Encourage your child to drink low-fat milk and eat at least 3 servings of dairy products per day.  · Limit daily intake of fruit juice to 8-12 oz (240-360 mL) each day.    · Try not to give your child sugary beverages or sodas.    · Try not to give your child fast food or other foods high in fat, salt, or sugar.    · Allow your child to help with meal planning and preparation. Teach your child how to make simple meals and snacks (such as a sandwich or popcorn).     · Encourage your child to make healthy food choices.  · Ensure your child eats breakfast.  · Body image and eating problems may start to develop at this age. Monitor your child closely for any signs of these issues, and contact your health care provider if you have any concerns.  ORAL HEALTH   · Continue to monitor your child's toothbrushing and encourage regular flossing.    · Give your child fluoride supplements as directed by your child's health care provider.    · Schedule regular dental examinations for your child.    · Talk to your child's dentist about dental sealants and whether your child may  "need braces.  SKIN CARE  Protect your child from sun exposure by ensuring your child wears weather-appropriate clothing, hats, or other coverings. Your child should apply a sunscreen that protects against UVA and UVB radiation to his or her skin when out in the sun. A sunburn can lead to more serious skin problems later in life.   SLEEP  · Children this age need 9-12 hours of sleep per day. Your child may want to stay up later, but still needs his or her sleep.  · A lack of sleep can affect your child's participation in his or her daily activities. Watch for tiredness in the mornings and lack of concentration at school.  · Continue to keep bedtime routines.    · Daily reading before bedtime helps a child to relax.    · Try not to let your child watch television before bedtime.  PARENTING TIPS  · Teach your child how to:    ¨ Handle bullying. Your child should instruct bullies or others trying to hurt him or her to stop and then walk away or find an adult.    ¨ Avoid others who suggest unsafe, harmful, or risky behavior.    ¨ Say \"no\" to tobacco, alcohol, and drugs.    · Talk to your child about:    ¨ Peer pressure and making good decisions.    ¨ The physical and emotional changes of puberty and how these changes occur at different times in different children.    ¨ Sex. Answer questions in clear, correct terms.    ¨ Feeling sad. Tell your child that everyone feels sad some of the time and that life has ups and downs. Make sure your child knows to tell you if he or she feels sad a lot.    · Talk to your child's teacher on a regular basis to see how your child is performing in school. Remain actively involved in your child's school and school activities. Ask your child if he or she feels safe at school.    · Help your child learn to control his or her temper and get along with siblings and friends. Tell your child that everyone gets angry and that talking is the best way to handle anger. Make sure your child knows to " stay calm and to try to understand the feelings of others.    · Give your child chores to do around the house.  · Teach your child how to handle money. Consider giving your child an allowance. Have your child save his or her money for something special.    · Correct or discipline your child in private. Be consistent and fair in discipline.    · Set clear behavioral boundaries and limits. Discuss consequences of good and bad behavior with your child.  · Acknowledge your child's accomplishments and improvements. Encourage him or her to be proud of his or her achievements.   · Even though your child is more independent now, he or she still needs your support. Be a positive role model for your child and stay actively involved in his or her life. Talk to your child about his or her daily events, friends, interests, challenges, and worries. Increased parental involvement, displays of love and caring, and explicit discussions of parental attitudes related to sex and drug abuse generally decrease risky behaviors.    · You may consider leaving your child at home for brief periods during the day. If you leave your child at home, give him or her clear instructions on what to do.  SAFETY  · Create a safe environment for your child.  ¨ Provide a tobacco-free and drug-free environment.  ¨ Keep all medicines, poisons, chemicals, and cleaning products capped and out of the reach of your child.  ¨ If you have a trampoline, enclose it within a safety fence.  ¨ Equip your home with smoke detectors and change the batteries regularly.  ¨ If guns and ammunition are kept in the home, make sure they are locked away separately. Your child should not know the lock combination or where the key is kept.  · Talk to your child about safety:  ¨ Discuss fire escape plans with your child.  ¨ Discuss drug, tobacco, and alcohol use among friends or at friends' homes.  ¨ Tell your child that no adult should tell him or her to keep a secret, scare him  or her, or see or handle his or her private parts. Tell your child to always tell you if this occurs.  ¨ Tell your child not to play with matches, lighters, and candles.  ¨ Tell your child to ask to go home or call you to be picked up if he or she feels unsafe at a party or in someone else's home.  · Make sure your child knows:  ¨ How to call your local emergency services (911 in [place] in case of an emergency.  ¨ Both parents' complete names and cellular phone or work phone numbers.  · Teach your child about the appropriate use of medicines, especially if your child takes medicine on a regular basis.  · Know your child's friends and their parents.  · Monitor gang activity in your neighborhood or local schools.  · Make sure your child wears a properly-fitting helmet when riding a bicycle, skating, or skateboarding. Adults should set a good example by also wearing helmets and following safety rules.  · Restrain your child in a belt-positioning booster seat until the vehicle seat belts fit properly. The vehicle seat belts usually fit properly when a child reaches a height of 4 ft 9 in (145 cm). This is usually between the ages of 8 and 12 years old. Never allow your 10-year-old to ride in the front seat of a vehicle with airbags.  · Discourage your child from using all-terrain vehicles or other motorized vehicles. If your child is going to ride in them, supervise your child and emphasize the importance of wearing a helmet and following safety rules.  · Trampolines are hazardous. Only one person should be allowed on the trampoline at a time. Children using a trampoline should always be supervised by an adult.  · Know the phone number to the poison control center in your area and keep it by the phone.  WHAT'S NEXT?  Your next visit should be when your child is 10 years old.      This information is not intended to replace advice given to you by your health care provider. Make sure you discuss any questions you have with  your health care provider.     Document Released: 01/07/2008 Document Revised: 01/08/2016 Document Reviewed: 09/02/2014  [employer]vier Interactive Patient Education ©2016 [employer]

## 2021-06-21 NOTE — LETTER
Letter by Mansi Pereira MD at      Author: Mansi Pereira MD Service: -- Author Type: --    Filed:  Encounter Date: 4/1/2021 Status: (Other)         Gia Sultana  [address] [place] [address]      April 1, 2021      Dear Gia Sultana,    Per our refill protocol, you are due for a medication check office visit. Your prescription for PRILOSEC,MAGNESIUM,EFFEXOR was sent to your pharmacy (03.29.21). Please call [phone] to schedule a PHYSICAL visit with DR PEREIRA/OR SNEHAL BENITEZ before your next refill is needed to avoid delays.    Thank you,  [facility]

## 2021-06-22 NOTE — PROGRESS NOTES
Assessment/Plan:     1. Chronic pain of left knee  I am most suspicious of degenerative tear of her lateral meniscus.  Cannot rule out ACL tear, exam somewhat difficult due to body habitus.  We discussed options.  I recommend conservative management with NSAIDs, ice, use of brace as needed, and physical therapy.  If lack of improvement or onset of additional symptoms, would consider referral to orthopedics.  Patient comfortable with plan.  - Ambulatory referral to Adult PT- Internal    2. Renal cyst, right  Orders placed for follow-up CT scan.  - CT Abdomen Pelvis With Oral With IV Contrast; Future    I encourage patient to explore options for weight loss medications including contrary if she is interested in exploring.  She declines referral today.      Patient Instructions   Use a knee brace or ace wrap for additional support of your knee.    Ibuprofen 600 mg 3 times daily with food.  May add Tylenol as needed.    Ice for 15-20 minutes 3 times daily as needed.    Schedule visit with physical therapy.    Our radiology department will call you to schedule CT scan.       Return in about 2 months (around 1/28/2019) for Annual physical.      Subjective:      Gia Sultana is a 52 y.o. female presented to clinic today for evaluation of left knee pain.  In July she was on a trip to [place], it was very hot and humid, she did quite a bit of walking.  Upon returning home she had significant knee pain and swelling in her left knee.  Seen in walk-in clinic on July 20, x-rays at that time were unremarkable.  Since then she has had daily knee pain, and is interrupting sleep.  She is been less active which is caused her to gain weight and she describes herself now is being more lazy.  Bothered by this.  Stairs are very difficult, especially going down them.  Notes it is worse with prolonged sitting.  When she is walking she tends to turn her foot outward.  Others are noticing she is limping.  Noting some grinding  intermittently with walking.  Noting edema left lateral knee.  In general feeling a little bit better as the weather has cooled down but still not gone.  Using ice to treat it, notes that the swelling and the discomfort at bedtime is much worse if she does not ice it after work.  Notes 1 week ago when she was walking she felt a loud pop or crack and then had significant troubles walking after that.  That seems to be improving but not resolved.  Using ibuprofen, sometimes leaves Excedrin as well which she finds helpful.    CT scan in March indicating hypodense lesion in her liver and also likely cysts in her kidney with six-month follow-up recommended, she is due for this and agreeable to scheduling today.    Bothered by weight gain.  Her daughter has done quite well on the medication contrary.  She is wondering if this is something that I prescribed, I discussed with her my prescribing practices and well I am in support of using medication I do not feel that I have a conference of program to support prescribing it.  I encouraged her to explore this option.    Current Outpatient Medications   Medication Sig Dispense Refill     aspirin 81 MG EC tablet Take 81 mg by mouth daily.       baclofen (LIORESAL) 10 MG tablet TAKE 1 TABLET (10 MG TOTAL) BY MOUTH 3 (THREE) TIMES A DAY. 30 tablet 1     cetirizine (ZYRTEC) 10 mg cap Take by mouth.       diclofenac (VOLTAREN) 75 MG EC tablet Take 1 tablet (75 mg total) by mouth 2 (two) times a day as needed. 45 tablet 0     lisinopril-hydrochlorothiazide (PRINZIDE,ZESTORETIC) 10-12.5 mg per tablet TAKE 1 TABLET BY MOUTH DAILY. 90 tablet 0     omeprazole (PRILOSEC) 20 MG capsule Take by mouth daily.       venlafaxine (EFFEXOR-XR) 75 MG 24 hr capsule TAKE ONE CAPSULE BY MOUTH DAILY 90 capsule 3     No current facility-administered medications for this visit.        Past Medical History, Family History, and Social History reviewed.  Past Medical History:   Diagnosis Date      Cholelithiasis      Depression      GERD (gastroesophageal reflux disease)      Hypertension      Past Surgical History:   Procedure Laterality Date      SECTION, CLASSIC       CHOLECYSTECTOMY       COLONOSCOPY      one polyp removed.     OR  DELIVERY ONLY      Description:  Section;  Recorded: 10/16/2008;  Comments: x2     OR LAP,CHOLECYSTECTOMY      Description: Cholecystectomy Laparoscopic;  Recorded: 04/15/2008;     OR REVISN TRACHEA,CERVICAL      Description: Tracheoplasty Cervical;  Recorded: 10/16/2008;  Comments: Trach inserted as a child     Penicillins; Sulfa (sulfonamide antibiotics); and Nexium [esomeprazole magnesium]  Family History   Problem Relation Age of Onset     Diabetes Mother      Obesity Mother      Dementia Father      Diabetes Brother      Obesity Brother      Social History     Socioeconomic History     Marital status:      Spouse name: Not on file     Number of children: Not on file     Years of education: Not on file     Highest education level: Not on file   Social Needs     Financial resource strain: Not on file     Food insecurity - worry: Not on file     Food insecurity - inability: Not on file     Transportation needs - medical: Not on file     Transportation needs - non-medical: Not on file   Occupational History     Not on file   Tobacco Use     Smoking status: Current Some Day Smoker     Packs/day: 0.25     Types: Cigarettes     Smokeless tobacco: Never Used   Substance and Sexual Activity     Alcohol use: Yes     Alcohol/week: 0.0 - 3.0 oz     Drug use: No     Sexual activity: Yes     Partners: Male     Comment: vasectomy in    Other Topics Concern     Not on file   Social History Narrative     Not on file         Review of systems is as stated in HPI, and the remainder of the 10 system review is otherwise negative.    Objective:     Vitals:    18 0903   BP: 122/78   Patient Site: Right Arm   Patient Position: Sitting   Cuff Size: Adult  "Large   Pulse: 74   Temp: 97.7  F (36.5  C)   TempSrc: Oral   Weight: (!) 244 lb (110.7 kg)   Height: 5' 1\" (1.549 m)    Body mass index is 46.1 kg/m .    Alert female.  Mild limp with ambulation.  She has some mild edema left lateral knee including distal thigh.  Very small intra-articular effusion.  She has good range of motion of her knee.  No fullness or masses behind her knee.  No focal tenderness to palpation of the joint line.  No pain with pressure on the patella.  I notice a shift of her patella with flexion and extension, this is somewhat painful.  Lachman's is unremarkable.  Anterior drawer seems a bit soft but difficult is difficult due to body habitus.  Radha's indicating pain and discomfort with knee extension while the knee is externally rotated.  No pop or shift palpable.    I reviewed x-rays performed 7/20/2018, left knee x-rays are normal.      This note has been dictated using voice recognition software. Any grammatical or context distortions are unintentional and inherent to the the software.     "

## 2021-06-23 ENCOUNTER — RECORDS - HEALTHEAST (OUTPATIENT)
Dept: FAMILY MEDICINE | Facility: CLINIC | Age: 56
End: 2021-06-23

## 2021-06-23 DIAGNOSIS — K21.9 GASTROESOPHAGEAL REFLUX DISEASE WITHOUT ESOPHAGITIS: ICD-10-CM

## 2021-06-23 DIAGNOSIS — F41.1 GENERALIZED ANXIETY DISORDER: ICD-10-CM

## 2021-06-24 RX ORDER — VENLAFAXINE HYDROCHLORIDE 75 MG/1
CAPSULE, EXTENDED RELEASE ORAL
Qty: 90 CAPSULE | Refills: 0 | Status: SHIPPED | OUTPATIENT
Start: 2021-06-24 | End: 2021-09-28

## 2021-06-24 NOTE — TELEPHONE ENCOUNTER
Left message to call back for: pt  Information to relay to patient:  Left message to call and schedule lab only appt.

## 2021-06-24 NOTE — TELEPHONE ENCOUNTER
RN cannot approve Refill Request    RN can NOT refill this medication PCP messaged that patient is overdue for Labs.      Kristi Wren, [facility] Triage/Med Refill 2/27/2019    Requested Prescriptions   Pending Prescriptions Disp Refills     lisinopril-hydrochlorothiazide (PRINZIDE,ZESTORETIC) 10-12.5 mg per tablet [Pharmacy Med Name: LISINOPRIL-HCTZ 10-12.5 MG TAB] 90 tablet 3     Sig: TAKE 1 TABLET BY MOUTH DAILY.    Diuretics/Combination Diuretics Refill Protocol  Failed - 2/27/2019  9:09 AM       Failed - Serum Potassium in past 12 months     No results found for: LN-POTASSIUM         Failed - Serum Sodium in past 12 months     No results found for: LN-SODIUM         Failed - Serum Creatinine in past 12 months     Creatinine   Date Value Ref Range Status   05/23/2017 0.82 0.60 - 1.10 mg/dL Final            Passed - Visit with PCP or prescribing provider visit in past 12 months    Last office visit with prescriber/PCP: 11/28/2018 Mansi Mccracken MD OR same dept: 11/28/2018 Mansi Mccracken MD OR same specialty: 11/28/2018 Mansi Mccracken MD  Last physical: 8/14/2015 Last MTM visit: Visit date not found   Next visit within 3 mo: Visit date not found  Next physical within 3 mo: Visit date not found  Prescriber OR PCP: Mansi Mccracken MD  Last diagnosis associated with med order: 1. Essential hypertension, malignant  - lisinopril-hydrochlorothiazide (PRINZIDE,ZESTORETIC) 10-12.5 mg per tablet [Pharmacy Med Name: LISINOPRIL-HCTZ 10-12.5 MG TAB]; TAKE 1 TABLET BY MOUTH DAILY.  Dispense: 90 tablet; Refill: 0    If protocol passes may refill for 12 months if within 3 months of last provider visit (or a total of 15 months).            Passed - Blood pressure on file in past 12 months    BP Readings from Last 1 Encounters:   11/28/18 122/78

## 2021-06-27 ENCOUNTER — HEALTH MAINTENANCE LETTER (OUTPATIENT)
Age: 56
End: 2021-06-27

## 2021-07-13 ENCOUNTER — RECORDS - HEALTHEAST (OUTPATIENT)
Dept: ADMINISTRATIVE | Facility: CLINIC | Age: 56
End: 2021-07-13

## 2021-07-21 ENCOUNTER — RECORDS - HEALTHEAST (OUTPATIENT)
Dept: ADMINISTRATIVE | Facility: CLINIC | Age: 56
End: 2021-07-21

## 2021-09-26 ENCOUNTER — OFFICE VISIT (OUTPATIENT)
Dept: FAMILY MEDICINE | Facility: CLINIC | Age: 56
End: 2021-09-26
Payer: COMMERCIAL

## 2021-09-26 VITALS
TEMPERATURE: 98.3 F | RESPIRATION RATE: 14 BRPM | SYSTOLIC BLOOD PRESSURE: 149 MMHG | HEART RATE: 86 BPM | OXYGEN SATURATION: 95 % | DIASTOLIC BLOOD PRESSURE: 82 MMHG

## 2021-09-26 DIAGNOSIS — N30.00 ACUTE CYSTITIS WITHOUT HEMATURIA: Primary | ICD-10-CM

## 2021-09-26 DIAGNOSIS — R35.0 URINARY FREQUENCY: ICD-10-CM

## 2021-09-26 LAB
ALBUMIN UR-MCNC: NEGATIVE MG/DL
APPEARANCE UR: ABNORMAL
BACTERIA #/AREA URNS HPF: ABNORMAL /HPF
BILIRUB UR QL STRIP: NEGATIVE
COLOR UR AUTO: YELLOW
GLUCOSE UR STRIP-MCNC: NEGATIVE MG/DL
HGB UR QL STRIP: ABNORMAL
KETONES UR STRIP-MCNC: NEGATIVE MG/DL
LEUKOCYTE ESTERASE UR QL STRIP: ABNORMAL
NITRATE UR QL: POSITIVE
PH UR STRIP: 5 [PH] (ref 5–8)
RBC #/AREA URNS AUTO: ABNORMAL /HPF
SP GR UR STRIP: 1.02 (ref 1–1.03)
SQUAMOUS #/AREA URNS AUTO: ABNORMAL /LPF
UROBILINOGEN UR STRIP-ACNC: 0.2 E.U./DL
WBC #/AREA URNS AUTO: ABNORMAL /HPF
WBC CLUMPS #/AREA URNS HPF: PRESENT /HPF

## 2021-09-26 PROCEDURE — 87186 SC STD MICRODIL/AGAR DIL: CPT | Performed by: NURSE PRACTITIONER

## 2021-09-26 PROCEDURE — 81001 URINALYSIS AUTO W/SCOPE: CPT | Performed by: NURSE PRACTITIONER

## 2021-09-26 PROCEDURE — 87088 URINE BACTERIA CULTURE: CPT | Performed by: NURSE PRACTITIONER

## 2021-09-26 PROCEDURE — 87086 URINE CULTURE/COLONY COUNT: CPT | Performed by: NURSE PRACTITIONER

## 2021-09-26 PROCEDURE — 99214 OFFICE O/P EST MOD 30 MIN: CPT | Performed by: NURSE PRACTITIONER

## 2021-09-26 RX ORDER — NITROFURANTOIN 25; 75 MG/1; MG/1
100 CAPSULE ORAL 2 TIMES DAILY
Qty: 14 CAPSULE | Refills: 0 | Status: SHIPPED | OUTPATIENT
Start: 2021-09-26 | End: 2021-10-03

## 2021-09-26 ASSESSMENT — ENCOUNTER SYMPTOMS
DYSURIA: 1
HEMATURIA: 1
CHILLS: 1
NAUSEA: 1
VOMITING: 0
ABDOMINAL PAIN: 1
FATIGUE: 1
FEVER: 0
FREQUENCY: 1

## 2021-09-26 NOTE — PATIENT INSTRUCTIONS

## 2021-09-26 NOTE — PROGRESS NOTES
Patient presents with:  UTI: cramping in front of stomach and back pain frequency poss blood in urine      Clinical Decision Making: Focused exam consistent with low abdominal pressure, urine positive for infection and microscopic hematuria. Will treat with a 7 day course of antibiotic. Urine culture process reviewed. Encouraged increased water intake, and close monitoring for improvement of symptoms.       ICD-10-CM    1. Acute cystitis without hematuria  N30.00 nitroFURantoin macrocrystal-monohydrate (MACROBID) 100 MG capsule   2. Urinary frequency  R35.0 UA macro with reflex to Microscopic and Culture - Clinc Collect     Urine Microscopic Exam     Urine Culture       Patient Instructions     Patient Education     Bladder Infection, Female (Adult)     Urine normally doesn't have any germs (bacteria) in it. But bacteria can get into the urinary tract from the skin around the rectum. Or they can travel in the blood from other parts of the body. Once they are in your urinary tract, they can cause infection in these areas:    The urethra (urethritis)    The bladder (cystitis)    The kidneys (pyelonephritis)  The most common place for an infection is in the bladder. This is called a bladder infection. This is one of the most common infections in women. Most bladder infections are easily treated. They are not serious unless the infection spreads to the kidney.  The terms bladder infection, UTI, and cystitis are often used to describe the same thing. But they are not always the same. Cystitis is an inflammation of the bladder. The most common cause of cystitis is an infection.  Symptoms  The infection causes inflammation in the urethra and bladder. This causes many of the symptoms. The most common symptoms of a bladder infection are:    Pain or burning when urinating    Having to urinate more often than normal    Urgent need to urinate    Only a small amount of urine comes out    Blood in urine    Belly (abdominal)  discomfort. This is often in the lower belly above the pubic bone.    Cloudy urine    Strong- or bad-smelling urine    Unable to urinate (urinary retention)    Unable to hold urine in (urinary incontinence)    Fever    Loss of appetite    Confusion (in older adults)  Causes  Bladder infections are not contagious. You can't get one from someone else, from a toilet seat, or from sharing a bath.  The most common cause of bladder infections is bacteria from the bowels. The bacteria get onto the skin around the opening of the urethra. From there, they can get into the urine. Then they travel up to the bladder, causing inflammation and infection. This often happens because of:    Wiping incorrectly after urinating. Always wipe from front to back.    Bowel incontinence    Pregnancy    Procedures such as having a catheter put in    Older age    Not emptying your bladder. This can give bacteria a chance to grow in your urine.    Fluid loss (dehydration)    Constipation    Having sex    Using a diaphragm for birth control   Treatment  Bladder infections are diagnosed by a urine test and urine culture. They are treated with antibiotics. They often clear up quickly without problems. Treatment helps prevent a more serious kidney infection.  Medicines  Medicines can help in the treatment of a bladder infection:    Take antibiotics until they are used up, even if you feel better. It's important to finish them to make sure the infection has cleared.    You can use acetaminophen or ibuprofen for pain, fever, or discomfort, unless another medicine was prescribed. If you have long-term (chronic) liver or kidney disease, talk with your healthcare provider before using these medicines. Also talk with your provider if you've ever had a stomach ulcer or GI (gastrointestinal) bleeding, or are taking blood-thinner medicines.    If you are given phenazopydridine to reduce burning with urination, it will make your urine a bright orange color.  This can stain clothing.  Care and prevention  These self-care steps can help prevent future infections:    Drink plenty of fluids. This helps to prevent dehydration and flush out your bladder. Do this unless you must restrict fluids for other health reasons, or your healthcare provider told you not to.    Clean yourself correctly after going to the bathroom. Wipe from front to back after using the toilet. This helps prevent the spread of bacteria.    Urinate more often. Don't try to hold urine in for a long time.    Wear loose-fitting clothes and cotton underwear. Don't wear tight-fitting pants.    Improve your diet and prevent constipation. Eat more fresh fruits and vegetables, and fiber. Eat less junk foods and fatty foods.    Don't have sex until your symptoms are gone.    Don't have caffeine, alcohol, and spicy foods. These can irritate your bladder.    Urinate right after you have sex to flush out your bladder.    If you use birth control pills and have frequent bladder infections, discuss it with your healthcare provider.  Follow-up care  Call your healthcare provider if all symptoms are not gone after 3 days of treatment. This is especially important if you have repeat infections.  If a culture was done, you will be told if your treatment needs to be changed. If directed, you can call to find out the results.  If X-rays were done, you will be told if the results will affect your treatment.  Call 911  Call 911 if any of the following occur:    Trouble breathing    Hard to wake up or confusion    Fainting (loss of consciousness)    Fast heart rate  When to get medical advice  Call your healthcare provider right away if any of these occur:    Fever of 100.4 F (38.0 C) or higher, or as directed by your healthcare provider    Symptoms are not better after 3 days of treatment    Back or belly pain that gets worse    Repeated vomiting, or unable to keep medicine down    Weakness or dizziness    Vaginal  discharge    Pain, redness, or swelling in the outer vaginal area (labia)  [employer] last reviewed this educational content on 11/1/2019 2000-2021 [employer]. All rights reserved. This information is not intended as a substitute for professional medical care. Always follow your healthcare professional's instructions.               HPI: Gia Sultana is a 55 year old female with a history of renal cyst and female stress incontinence, who presents today complaining of 3-4 days of urinary frequency, dysuria, chills, and now blood with wiping. Reports taking tylenol and aleve with some relief. Denies any concerns for STDs, currently sexually active and postmenopausal. Denies any vaginal bleeding or discharge.     History obtained from the patient.    Problem List:  2018-03: Hepatic cyst  2018-03: Renal cyst  2016-04: Low serum vitamin D  Female Stress Incontinence  Obesity  Generalized Anxiety Disorder  Allergies  Dyslipidemia  Hypertension      Past Medical History:   Diagnosis Date     Cholelithiasis      Depression      GERD (gastroesophageal reflux disease)      Hypertension        Social History     Tobacco Use     Smoking status: Current Some Day Smoker     Packs/day: 0.25     Types: Cigarettes, Cigarettes     Smokeless tobacco: Never Used   Substance Use Topics     Alcohol use: Yes     Alcohol/week: 0.0 - 5.0 standard drinks       Review of Systems   Constitutional: Positive for chills and fatigue. Negative for fever.   Gastrointestinal: Positive for abdominal pain and nausea. Negative for vomiting.   Genitourinary: Positive for dysuria, frequency, hematuria and urgency. Negative for enuresis, genital sores, vaginal bleeding and vaginal discharge.       Vitals:    09/26/21 0932   BP: (!) 149/82   Pulse: 86   Resp: 14   Temp: 98.3  F (36.8  C)   TempSrc: Oral   SpO2: 95%   Elevated BP: has not taken B agent this morning.     Physical Exam  Constitutional:       Appearance: Normal appearance. She  is not ill-appearing or diaphoretic.   Cardiovascular:      Rate and Rhythm: Normal rate and regular rhythm.      Pulses: Normal pulses.      Heart sounds: Normal heart sounds.   Pulmonary:      Effort: Pulmonary effort is normal.      Breath sounds: Normal breath sounds.   Abdominal:      General: There is no distension.      Palpations: Abdomen is soft. There is no mass.      Tenderness: There is abdominal tenderness. There is no right CVA tenderness, left CVA tenderness, guarding or rebound.      Comments: Suprapubic pressure with palpation   Skin:     General: Skin is warm.      Capillary Refill: Capillary refill takes less than 2 seconds.      Findings: No rash.   Neurological:      Mental Status: She is alert and oriented to person, place, and time.   Psychiatric:         Behavior: Behavior normal.         Labs:  Results for orders placed or performed in visit on 09/26/21   UA macro with reflex to Microscopic and Culture - Clinc Collect     Status: Abnormal    Specimen: Urine, Clean Catch   Result Value Ref Range    Color Urine Yellow Colorless, Straw, Light Yellow, Yellow    Appearance Urine Slightly Cloudy (A) Clear    Glucose Urine Negative Negative mg/dL    Bilirubin Urine Negative Negative    Ketones Urine Negative Negative mg/dL    Specific Gravity Urine 1.025 1.005 - 1.030    Blood Urine Small (A) Negative    pH Urine 5.0 5.0 - 8.0    Protein Albumin Urine Negative Negative mg/dL    Urobilinogen Urine 0.2 0.2, 1.0 E.U./dL    Nitrite Urine Positive (A) Negative    Leukocyte Esterase Urine Trace (A) Negative   Urine Microscopic Exam     Status: Abnormal   Result Value Ref Range    Bacteria Urine Moderate (A) None Seen /HPF    RBC Urine 2-5 (A) 0-2 /HPF /HPF    WBC Urine 10-25 (A) 0-5 /HPF /HPF    Squamous Epithelials Urine Few (A) None Seen /LPF    WBC Clumps Urine Present (A) None Seen /HPF         At the end of the encounter, I discussed results, diagnosis, medications. Discussed red flags for immediate  return to clinic/ER, as well as indications for follow up if no improvement. Patient understood and agreed to plan.     KAREEM Stubbs CNP

## 2021-09-28 ENCOUNTER — OFFICE VISIT (OUTPATIENT)
Dept: FAMILY MEDICINE | Facility: CLINIC | Age: 56
End: 2021-09-28
Payer: COMMERCIAL

## 2021-09-28 VITALS
TEMPERATURE: 97.3 F | DIASTOLIC BLOOD PRESSURE: 84 MMHG | HEART RATE: 84 BPM | RESPIRATION RATE: 16 BRPM | SYSTOLIC BLOOD PRESSURE: 134 MMHG | HEIGHT: 61 IN | BODY MASS INDEX: 47.09 KG/M2 | WEIGHT: 249.4 LBS

## 2021-09-28 DIAGNOSIS — Z12.31 VISIT FOR SCREENING MAMMOGRAM: ICD-10-CM

## 2021-09-28 DIAGNOSIS — I10 ESSENTIAL HYPERTENSION: Primary | ICD-10-CM

## 2021-09-28 DIAGNOSIS — M17.12 PRIMARY OSTEOARTHRITIS OF LEFT KNEE: ICD-10-CM

## 2021-09-28 DIAGNOSIS — E55.9 VITAMIN D DEFICIENCY: ICD-10-CM

## 2021-09-28 DIAGNOSIS — Z00.00 HEALTH CARE MAINTENANCE: ICD-10-CM

## 2021-09-28 DIAGNOSIS — E66.01 MORBID OBESITY (H): ICD-10-CM

## 2021-09-28 DIAGNOSIS — E78.5 DYSLIPIDEMIA: ICD-10-CM

## 2021-09-28 DIAGNOSIS — F41.1 GENERALIZED ANXIETY DISORDER: ICD-10-CM

## 2021-09-28 LAB
ALBUMIN SERPL-MCNC: 4.4 G/DL (ref 3.5–5)
ALP SERPL-CCNC: 92 U/L (ref 45–120)
ALT SERPL W P-5'-P-CCNC: 20 U/L (ref 0–45)
ANION GAP SERPL CALCULATED.3IONS-SCNC: 14 MMOL/L (ref 5–18)
AST SERPL W P-5'-P-CCNC: 16 U/L (ref 0–40)
BACTERIA UR CULT: ABNORMAL
BILIRUB SERPL-MCNC: 0.3 MG/DL (ref 0–1)
BUN SERPL-MCNC: 14 MG/DL (ref 8–22)
CALCIUM SERPL-MCNC: 10.4 MG/DL (ref 8.5–10.5)
CHLORIDE BLD-SCNC: 102 MMOL/L (ref 98–107)
CHOLEST SERPL-MCNC: 265 MG/DL
CO2 SERPL-SCNC: 24 MMOL/L (ref 22–31)
CREAT SERPL-MCNC: 0.92 MG/DL (ref 0.6–1.1)
ERYTHROCYTE [DISTWIDTH] IN BLOOD BY AUTOMATED COUNT: 13 % (ref 10–15)
FASTING STATUS PATIENT QL REPORTED: YES
GFR SERPL CREATININE-BSD FRML MDRD: 70 ML/MIN/1.73M2
GLUCOSE BLD-MCNC: 115 MG/DL (ref 70–125)
HBA1C MFR BLD: 5.8 % (ref 0–5.6)
HCT VFR BLD AUTO: 43.2 % (ref 35–47)
HDLC SERPL-MCNC: 50 MG/DL
HGB BLD-MCNC: 14.2 G/DL (ref 11.7–15.7)
LDLC SERPL CALC-MCNC: 163 MG/DL
MCH RBC QN AUTO: 27.3 PG (ref 26.5–33)
MCHC RBC AUTO-ENTMCNC: 32.9 G/DL (ref 31.5–36.5)
MCV RBC AUTO: 83 FL (ref 78–100)
PLATELET # BLD AUTO: 374 10E3/UL (ref 150–450)
POTASSIUM BLD-SCNC: 4.5 MMOL/L (ref 3.5–5)
PROT SERPL-MCNC: 7.6 G/DL (ref 6–8)
RBC # BLD AUTO: 5.2 10E6/UL (ref 3.8–5.2)
SODIUM SERPL-SCNC: 140 MMOL/L (ref 136–145)
TRIGL SERPL-MCNC: 262 MG/DL
WBC # BLD AUTO: 7.5 10E3/UL (ref 4–11)

## 2021-09-28 PROCEDURE — 80061 LIPID PANEL: CPT | Performed by: NURSE PRACTITIONER

## 2021-09-28 PROCEDURE — 82306 VITAMIN D 25 HYDROXY: CPT | Performed by: NURSE PRACTITIONER

## 2021-09-28 PROCEDURE — 80053 COMPREHEN METABOLIC PANEL: CPT | Performed by: NURSE PRACTITIONER

## 2021-09-28 PROCEDURE — 99214 OFFICE O/P EST MOD 30 MIN: CPT | Performed by: NURSE PRACTITIONER

## 2021-09-28 PROCEDURE — 85027 COMPLETE CBC AUTOMATED: CPT | Performed by: NURSE PRACTITIONER

## 2021-09-28 PROCEDURE — 36415 COLL VENOUS BLD VENIPUNCTURE: CPT | Performed by: NURSE PRACTITIONER

## 2021-09-28 PROCEDURE — 83036 HEMOGLOBIN GLYCOSYLATED A1C: CPT | Performed by: NURSE PRACTITIONER

## 2021-09-28 ASSESSMENT — MIFFLIN-ST. JEOR: SCORE: 1658.65

## 2021-09-28 ASSESSMENT — PATIENT HEALTH QUESTIONNAIRE - PHQ9
SUM OF ALL RESPONSES TO PHQ QUESTIONS 1-9: 3
10. IF YOU CHECKED OFF ANY PROBLEMS, HOW DIFFICULT HAVE THESE PROBLEMS MADE IT FOR YOU TO DO YOUR WORK, TAKE CARE OF THINGS AT HOME, OR GET ALONG WITH OTHER PEOPLE: NOT DIFFICULT AT ALL
SUM OF ALL RESPONSES TO PHQ QUESTIONS 1-9: 3

## 2021-09-28 NOTE — PROGRESS NOTES
Assessment & Plan     Hypertension  Blood pressure overall well controlled despite slight elevation today.  Possible that current UTI is contributing.  Will have her continue current dose of lisinopril/hydrochlorothiazide and monitor.  Will check metabolic panel today to ensure stable renal function and electrolytes.    - Comprehensive metabolic panel (BMP + Alb, Alk Phos, ALT, AST, Total. Bili, TP)  - CBC with platelets  - Comprehensive metabolic panel (BMP + Alb, Alk Phos, ALT, AST, Total. Bili, TP)  - CBC with platelets    Morbid obesity (H)  Encourage lifestyle modifications in regards to diet and exercise to promote weight loss.  Will check hemoglobin A1c today as well.  - Hemoglobin A1c  - Hemoglobin A1c    Dyslipidemia  History of dyslipidemia noted.  Due for follow-up on this today.  She is fasting, will check lipid panel.  As above, continue to work on implementing healthy diet choices and regular exercise.  - Lipid panel reflex to direct LDL Fasting  - Lipid panel reflex to direct LDL Fasting    Primary osteoarthritis of left knee  Worsening pain of left knee likely related to osteoarthritis.  She has been followed by [facility] and is requesting a referral today for insurance purposes.  - Orthopedic  Referral    Generalized anxiety disorder  History of anxiety noted.  No new concerns in this regard today.  She remains well controlled on venlafaxine 75 mg daily.    Vitamin D deficiency  History of vitamin D deficiency noted.  Will check vitamin D level.  - Vitamin D deficiency screening  - Vitamin D deficiency screening    Visit for screening mammogram  She is due for routine mammogram screening.  Referral placed today.  - *MA Screening Digital Bilateral    Health Care maintenance  Patient had influenza vaccine administered at work.  We discussed option for pneumonia and shingles vaccine.  She will consider having these done at her upcoming physical.  Encouraged her to schedule this,  "she will be due for Pap smear at that time.    Tobacco use  Encouraged smoking cessation.  Patient not interested in medication management at this time.    Tobacco Cessation:   reports that she has been smoking cigarettes and cigarettes. She has been smoking about 0.25 packs per day. She has never used smokeless tobacco.  Tobacco Cessation Action Plan: Information offered: Patient not interested at this time    BMI:   Estimated body mass index is 47.12 kg/m  as calculated from the following:    Height as of this encounter: 1.549 m (5' 1\").    Weight as of this encounter: 113.1 kg (249 lb 6.4 oz).   Weight management plan: Discussed healthy diet and exercise guidelines      Return in about 3 months (around 12/28/2021) for Routine preventive.    KAREEM Aleman CNP  [facility] [place] Nielsen is a 55 year old who presents for the following health issues : routine medication check    HPI   Patient is here today for routine medication check.  Medical history is significant for obesity, dyslipidemia, hypertension, anxiety, low vitamin D, renal cyst.  We reviewed CT results from 2018 indicating stability of renal cyst and spot on her liver.  No indication to follow-up at this time however we discussed possibility in the future or if she develops any symptoms.  She remains on lisinopril/hydrochlorothiazide for blood pressure management and remains compliant on this.  No chest pain, shortness of breath, lightheadedness etc.  She is on venlafaxine 75 mg for anxiety and doing well on current dose.  She remains on omeprazole for reflux.  She is on baby aspirin.  She is currently being treated for urinary tract infection with Macrobid and has a few days left of this.  Generally feeling better but still has some discomfort in her lower abdomen and back.  No worsening symptoms.  She has tried to quit smoking in the past.  She has Chantix available but has not yet used it.  She is not interested in " "any other assistance at this time.  She had her flu shot administered at work.  She would like to wait on pneumonia and shingles immunizations today.  She is requesting that a referral be placed to [facility].  She has been following with them for years since having issues with her left knee.  She states that it is bone on bone.  Has tried physical therapy and more recently steroid injections with very little benefit.  The next step is likely surgical intervention.  Due to insurance purposes, she needs to have a referral placed.  She does not have any additional concerns at this time.      Review of Systems   Review of Systems - pertinent positives noted in HPI, otherwise ROS is negative.        Objective    /84   Pulse 84   Temp 97.3  F (36.3  C) (Temporal)   Resp 16   Ht 1.549 m (5' 1\")   Wt 113.1 kg (249 lb 6.4 oz)   BMI 47.12 kg/m    Body mass index is 47.12 kg/m .  Physical Exam     General Appearance:  Alert, overweight female.  She cooperative, no distress, appears stated age   HEENT:  Normal.  No acute findings.   Neck: Supple, symmetrical, no adenopathy.   Lungs:   Clear to auscultation bilaterally, respirations unlabored.  No expiratory wheeze or inspiratory crackles noted.   Heart:  Regular rate and rhythm, S1, S2 normal, no murmur, rub or gallop   Abdomen:    Back:   Soft, non-tender, positive bowel sounds, no masses, no organomegaly  No CVA tenderness   Extremities: Extremities normal.  No cyanosis or edema   Skin: Warm, dry.  Skin color, texture, turgor normal, no rashes or lesions   Neurologic:  Grossly intact.             Answers for HPI/ROS submitted by the patient on 9/28/2021  If you checked off any problems, how difficult have these problems made it for you to do your work, take care of things at home, or get along with other people?: Not difficult at all  PHQ9 TOTAL SCORE: 3      "

## 2021-09-29 LAB — DEPRECATED CALCIDIOL+CALCIFEROL SERPL-MC: 32 UG/L (ref 30–80)

## 2021-09-29 ASSESSMENT — PATIENT HEALTH QUESTIONNAIRE - PHQ9: SUM OF ALL RESPONSES TO PHQ QUESTIONS 1-9: 3

## 2021-10-06 ENCOUNTER — TRANSFERRED RECORDS (OUTPATIENT)
Dept: HEALTH INFORMATION MANAGEMENT | Facility: CLINIC | Age: 56
End: 2021-10-06

## 2021-10-13 ENCOUNTER — TRANSFERRED RECORDS (OUTPATIENT)
Dept: HEALTH INFORMATION MANAGEMENT | Facility: CLINIC | Age: 56
End: 2021-10-13

## 2021-10-17 ENCOUNTER — HEALTH MAINTENANCE LETTER (OUTPATIENT)
Age: 56
End: 2021-10-17

## 2021-12-17 DIAGNOSIS — F41.1 GENERALIZED ANXIETY DISORDER: ICD-10-CM

## 2021-12-19 RX ORDER — VENLAFAXINE HYDROCHLORIDE 75 MG/1
CAPSULE, EXTENDED RELEASE ORAL
Qty: 90 CAPSULE | Refills: 2 | Status: SHIPPED | OUTPATIENT
Start: 2021-12-19 | End: 2022-11-17

## 2021-12-20 NOTE — TELEPHONE ENCOUNTER
"Last Written Prescription Date:  3/29/21  Last Fill Quantity: 90,  # refills: 0   Last office visit provider:  9/28/21     Requested Prescriptions   Pending Prescriptions Disp Refills     venlafaxine (EFFEXOR-XR) 75 MG 24 hr capsule [Pharmacy Med Name: VENLAFAXINE HCL ER 75 MG CAP] 90 capsule 0     Sig: TAKE 1 CAPSULE BY MOUTH EVERY DAY       Serotonin-Norepinephrine Reuptake Inhibitors  Passed - 12/17/2021  1:23 AM        Passed - Blood pressure under 140/90 in past 12 months     BP Readings from Last 3 Encounters:   09/28/21 134/84   09/26/21 (!) 149/82   11/16/20 (!) 161/92                 Passed - Recent (12 mo) or future (30 days) visit within the authorizing provider's specialty     Patient has had an office visit with the authorizing provider or a provider within the authorizing providers department within the previous 12 mos or has a future within next 30 days. See \"Patient Info\" tab in inbasket, or \"Choose Columns\" in Meds & Orders section of the refill encounter.              Passed - Medication is active on med list        Passed - Patient is age 18 or older        Passed - No active pregnancy on record        Passed - Normal serum creatinine on file in past 12 months     Recent Labs   Lab Test 09/28/21  0836   CR 0.92       Ok to refill medication if creatinine is low          Passed - No positive pregnancy test in past 12 months             jeferson cuevas RN 12/19/21 8:15 PM  "

## 2022-01-18 DIAGNOSIS — K21.9 GASTROESOPHAGEAL REFLUX DISEASE WITHOUT ESOPHAGITIS: ICD-10-CM

## 2022-01-18 DIAGNOSIS — I10 ESSENTIAL HYPERTENSION, MALIGNANT: ICD-10-CM

## 2022-01-20 RX ORDER — LISINOPRIL/HYDROCHLOROTHIAZIDE 10-12.5 MG
1 TABLET ORAL DAILY
Qty: 90 TABLET | Refills: 0 | Status: SHIPPED | OUTPATIENT
Start: 2022-01-20 | End: 2022-09-27

## 2022-01-20 NOTE — TELEPHONE ENCOUNTER
"Last Written Prescription Date:  12/20/21  Last Fill Quantity: 30,  # refills: 0   Last office visit provider:  9/28/21 scheduled 3/16/22    Requested Prescriptions   Pending Prescriptions Disp Refills     lisinopril-hydrochlorothiazide (ZESTORETIC) 10-12.5 MG tablet [Pharmacy Med Name: LISINOPRIL-HCTZ 10-12.5 MG TAB] 30 tablet 0     Sig: TAKE 1 TABLET BY MOUTH DAILY **OFFICE VISIT REQUIRED FOR FURTHER REFILLS**       Diuretics (Including Combos) Protocol Passed - 1/18/2022 12:34 PM        Passed - Blood pressure under 140/90 in past 12 months     BP Readings from Last 3 Encounters:   09/28/21 134/84   09/26/21 (!) 149/82   11/16/20 (!) 161/92                 Passed - Recent (12 mo) or future (30 days) visit within the authorizing provider's specialty     Patient has had an office visit with the authorizing provider or a provider within the authorizing providers department within the previous 12 mos or has a future within next 30 days. See \"Patient Info\" tab in inbasket, or \"Choose Columns\" in Meds & Orders section of the refill encounter.              Passed - Medication is active on med list        Passed - Patient is age 18 or older        Passed - No active pregancy on record        Passed - Normal serum creatinine on file in past 12 months     Recent Labs   Lab Test 09/28/21  0836   CR 0.92              Passed - Normal serum potassium on file in past 12 months     Recent Labs   Lab Test 09/28/21  0836   POTASSIUM 4.5                    Passed - Normal serum sodium on file in past 12 months     Recent Labs   Lab Test 09/28/21  0836                 Passed - No positive pregnancy test in past 12 months       ACE Inhibitors (Including Combos) Protocol Passed - 1/18/2022 12:34 PM        Passed - Blood pressure under 140/90 in past 12 months     BP Readings from Last 3 Encounters:   09/28/21 134/84   09/26/21 (!) 149/82   11/16/20 (!) 161/92                 Passed - Recent (12 mo) or future (30 days) visit " "within the authorizing provider's specialty     Patient has had an office visit with the authorizing provider or a provider within the authorizing providers department within the previous 12 mos or has a future within next 30 days. See \"Patient Info\" tab in inbasket, or \"Choose Columns\" in Meds & Orders section of the refill encounter.              Passed - Medication is active on med list        Passed - Patient is age 18 or older        Passed - No active pregnancy on record        Passed - Normal serum creatinine on file in past 12 months     Recent Labs   Lab Test 09/28/21  0836   CR 0.92       Ok to refill medication if creatinine is low          Passed - Normal serum potassium on file in past 12 months     Recent Labs   Lab Test 09/28/21  0836   POTASSIUM 4.5             Passed - No positive pregnancy test within past 12 months           omeprazole (PRILOSEC) 20 MG DR capsule [Pharmacy Med Name: OMEPRAZOLE DR 20 MG CAPSULE] 30 capsule 0     Sig: TAKE 1 CAPSULE (20 MG) BY MOUTH DAILY **OFFICE VISIT REQUIRED FOR FURTHER REFILLS**       PPI Protocol Passed - 1/18/2022 12:34 PM        Passed - Not on Clopidogrel (unless Pantoprazole ordered)        Passed - No diagnosis of osteoporosis on record        Passed - Recent (12 mo) or future (30 days) visit within the authorizing provider's specialty     Patient has had an office visit with the authorizing provider or a provider within the authorizing providers department within the previous 12 mos or has a future within next 30 days. See \"Patient Info\" tab in inBadSeedet, or \"Choose Columns\" in Meds & Orders section of the refill encounter.              Passed - Medication is active on med list        Passed - Patient is age 18 or older        Passed - No active pregnacy on record        Passed - No positive pregnancy test in past 12 months             Lencho Reynolds RN 01/20/22 10:16 AM  "

## 2022-03-31 DIAGNOSIS — E83.42 HYPOMAGNESEMIA: ICD-10-CM

## 2022-04-03 RX ORDER — MAGNESIUM OXIDE 400 MG/1
TABLET ORAL
Qty: 90 TABLET | Refills: 5 | Status: SHIPPED | OUTPATIENT
Start: 2022-04-03

## 2022-04-03 NOTE — TELEPHONE ENCOUNTER
Routing refill request to provider for review/approval because:  Drug not on the G refill protocol     Last Written Prescription Date:  3/29/21  Last Fill Quantity: 120,  # refills: 3   Last office visit provider:  9/28/21     Requested Prescriptions   Pending Prescriptions Disp Refills     magnesium oxide (MAG-OX) 400 MG tablet [Pharmacy Med Name: MAGNESIUM OXIDE 400 MG TABLET] 90 tablet 5     Sig: TAKE 1 TABLET BY MOUTH EVERY DAY       There is no refill protocol information for this order          Seda South RN 04/03/22 8:56 AM

## 2022-04-22 ENCOUNTER — OFFICE VISIT (OUTPATIENT)
Dept: FAMILY MEDICINE | Facility: CLINIC | Age: 57
End: 2022-04-22
Payer: COMMERCIAL

## 2022-04-22 VITALS
BODY MASS INDEX: 47.22 KG/M2 | HEART RATE: 112 BPM | RESPIRATION RATE: 18 BRPM | WEIGHT: 249.9 LBS | DIASTOLIC BLOOD PRESSURE: 83 MMHG | SYSTOLIC BLOOD PRESSURE: 126 MMHG | TEMPERATURE: 98.7 F | OXYGEN SATURATION: 95 %

## 2022-04-22 DIAGNOSIS — K57.32 DIVERTICULITIS OF COLON: ICD-10-CM

## 2022-04-22 DIAGNOSIS — R10.32 ABDOMINAL PAIN, LEFT LOWER QUADRANT: Primary | ICD-10-CM

## 2022-04-22 LAB
ALBUMIN UR-MCNC: NEGATIVE MG/DL
APPEARANCE UR: CLEAR
BILIRUB UR QL STRIP: NEGATIVE
COLOR UR AUTO: YELLOW
ERYTHROCYTE [DISTWIDTH] IN BLOOD BY AUTOMATED COUNT: 13.1 % (ref 10–15)
GLUCOSE UR STRIP-MCNC: NEGATIVE MG/DL
HCT VFR BLD AUTO: 41.7 % (ref 35–47)
HGB BLD-MCNC: 13.5 G/DL (ref 11.7–15.7)
HGB UR QL STRIP: ABNORMAL
KETONES UR STRIP-MCNC: NEGATIVE MG/DL
LEUKOCYTE ESTERASE UR QL STRIP: NEGATIVE
MCH RBC QN AUTO: 26.7 PG (ref 26.5–33)
MCHC RBC AUTO-ENTMCNC: 32.4 G/DL (ref 31.5–36.5)
MCV RBC AUTO: 82 FL (ref 78–100)
NITRATE UR QL: NEGATIVE
PH UR STRIP: 5 [PH] (ref 5–8)
PLATELET # BLD AUTO: 362 10E3/UL (ref 150–450)
RBC # BLD AUTO: 5.06 10E6/UL (ref 3.8–5.2)
RBC #/AREA URNS AUTO: ABNORMAL /HPF
SP GR UR STRIP: 1.02 (ref 1–1.03)
SQUAMOUS #/AREA URNS AUTO: ABNORMAL /LPF
UROBILINOGEN UR STRIP-ACNC: 0.2 E.U./DL
WBC # BLD AUTO: 15.8 10E3/UL (ref 4–11)
WBC #/AREA URNS AUTO: ABNORMAL /HPF

## 2022-04-22 PROCEDURE — 36415 COLL VENOUS BLD VENIPUNCTURE: CPT

## 2022-04-22 PROCEDURE — 99214 OFFICE O/P EST MOD 30 MIN: CPT

## 2022-04-22 PROCEDURE — 81001 URINALYSIS AUTO W/SCOPE: CPT

## 2022-04-22 PROCEDURE — 85027 COMPLETE CBC AUTOMATED: CPT

## 2022-04-22 RX ORDER — CIPROFLOXACIN 500 MG/1
500 TABLET, FILM COATED ORAL 2 TIMES DAILY
Qty: 20 TABLET | Refills: 0 | Status: SHIPPED | OUTPATIENT
Start: 2022-04-22 | End: 2022-05-02

## 2022-04-22 RX ORDER — METRONIDAZOLE 500 MG/1
500 TABLET ORAL 2 TIMES DAILY
Qty: 20 TABLET | Refills: 0 | Status: SHIPPED | OUTPATIENT
Start: 2022-04-22 | End: 2022-05-02

## 2022-04-22 NOTE — PATIENT INSTRUCTIONS
UA is negative.  WBC are elevated to 15.8 and 11 is normal.  Treating with Cipro and Flagyl twice daily for 10 days.  Recommend use of probiotic daily while on treatment.  Push fluids and move back to regular diet as your improving.    Follow-up with PCP to discuss follow-up colonoscopy in 6-8 weeks after symptoms improve.

## 2022-04-22 NOTE — PROGRESS NOTES
Assessment & Plan     Abdominal pain, left lower quadrant  UA is negative.  - UA macro with reflex to Microscopic and Culture - Clinc Collect  - CBC with platelets; Future  - CBC with platelets  - Urine Microscopic    Diverticulitis of colon  WBC elevated to 15.8 so most likely diverticulitis.  I will treat with cipro and flagyl for 10 days.  Recommended use of probiotic daily with treatment.  Close follow-up recommended if any persistent or worsening symptoms in the next 3 days.    - ciprofloxacin (CIPRO) 500 MG tablet; Take 1 tablet (500 mg) by mouth 2 times daily for 10 days  - metroNIDAZOLE (FLAGYL) 500 MG tablet; Take 1 tablet (500 mg) by mouth 2 times daily for 10 days    See Patient Instructions    Return in about 1 week (around 4/29/2022), or if symptoms worsen or fail to improve.    Yojana Fong NP on 4/22/2022 at 4:54 PM  [facility]    Alfonso Nielsen is a 56 year old who presents for the following health issues     HPI     Chief Complaint   Patient presents with     Abdominal Pain     Pain in LLQ for 3 days, hx of diverticulitis, low grade fever last night, nausea, constipated, also has pain her low back  since yesterday     Gastro    Onset of symptoms was 3 day(s) ago.  Course of illness is worsening.    Severity severe  Current and Associated symptoms:  abdominal pain LLQ, cramping and gas appetite is decreased but should be pushing more fluids.  No blood in stool, constipated. Symptoms are her typical diverticulitis.  Aggravating factors: movement, activity, pressure, eating, dairy products, standing and bowel movement.    Alleviating factors: heating pad  Diarrhea: No  Stools: formed and small amounts daily  Vomitting: no but nausea a little  Appetite: decreased  Risk factors: hx of diverticulitis    Last stool was small and yesterday, no blood.  Small amounts daily is typical recently    Review of Systems   CONSTITUTIONAL: NEGATIVE for fever, chills,  change in weight  RESP: NEGATIVE for significant cough or SOB  CV: NEGATIVE for chest pain, palpitations or peripheral edema  GI: POSITIVE for constipation, gas or bloating, nausea and poor appetite, hx of diverticulitis  PSYCHIATRIC: NEGATIVE for changes in mood or affect  ROS otherwise negative      Objective    /83   Pulse 112   Temp 98.7  F (37.1  C) (Oral)   Resp 18   Wt 113.4 kg (249 lb 14.4 oz)   SpO2 95%   BMI 47.22 kg/m    Body mass index is 47.22 kg/m .  Physical Exam   GENERAL: healthy, alert and no distress  PSYCH: mentation appears normal, affect normal/bright  Patient declines any exam on her abdomen today since it is sore    Results for orders placed or performed in visit on 04/22/22 (from the past 24 hour(s))   UA macro with reflex to Microscopic and Culture - Clinc Collect    Specimen: Urine, Clean Catch   Result Value Ref Range    Color Urine Yellow Colorless, Straw, Light Yellow, Yellow    Appearance Urine Clear Clear    Glucose Urine Negative Negative mg/dL    Bilirubin Urine Negative Negative    Ketones Urine Negative Negative mg/dL    Specific Gravity Urine 1.020 1.005 - 1.030    Blood Urine Small (A) Negative    pH Urine 5.0 5.0 - 8.0    Protein Albumin Urine Negative Negative mg/dL    Urobilinogen Urine 0.2 0.2, 1.0 E.U./dL    Nitrite Urine Negative Negative    Leukocyte Esterase Urine Negative Negative   Urine Microscopic   Result Value Ref Range    RBC Urine None Seen 0-2 /HPF /HPF    WBC Urine 0-5 0-5 /HPF /HPF    Squamous Epithelials Urine Moderate (A) None Seen /LPF    Narrative    Urine Culture not indicated   CBC with platelets   Result Value Ref Range    WBC Count 15.8 (H) 4.0 - 11.0 10e3/uL    RBC Count 5.06 3.80 - 5.20 10e6/uL    Hemoglobin 13.5 11.7 - 15.7 g/dL    Hematocrit 41.7 35.0 - 47.0 %    MCV 82 78 - 100 fL    MCH 26.7 26.5 - 33.0 pg    MCHC 32.4 31.5 - 36.5 g/dL    RDW 13.1 10.0 - 15.0 %    Platelet Count 362 150 - 450 10e3/uL

## 2022-06-15 ENCOUNTER — OFFICE VISIT (OUTPATIENT)
Dept: FAMILY MEDICINE | Facility: CLINIC | Age: 57
End: 2022-06-15
Payer: COMMERCIAL

## 2022-06-15 VITALS
DIASTOLIC BLOOD PRESSURE: 82 MMHG | WEIGHT: 248 LBS | TEMPERATURE: 98.5 F | OXYGEN SATURATION: 99 % | HEART RATE: 94 BPM | RESPIRATION RATE: 20 BRPM | BODY MASS INDEX: 46.86 KG/M2 | SYSTOLIC BLOOD PRESSURE: 169 MMHG

## 2022-06-15 DIAGNOSIS — J01.00 ACUTE NON-RECURRENT MAXILLARY SINUSITIS: Primary | ICD-10-CM

## 2022-06-15 DIAGNOSIS — J02.9 SORE THROAT: ICD-10-CM

## 2022-06-15 LAB — DEPRECATED S PYO AG THROAT QL EIA: NEGATIVE

## 2022-06-15 PROCEDURE — 99213 OFFICE O/P EST LOW 20 MIN: CPT | Performed by: PHYSICIAN ASSISTANT

## 2022-06-15 PROCEDURE — 87651 STREP A DNA AMP PROBE: CPT | Performed by: PHYSICIAN ASSISTANT

## 2022-06-15 RX ORDER — FLUTICASONE PROPIONATE 50 MCG
2 SPRAY, SUSPENSION (ML) NASAL DAILY
Qty: 9.9 ML | Refills: 0 | Status: SHIPPED | OUTPATIENT
Start: 2022-06-15 | End: 2022-07-08

## 2022-06-15 RX ORDER — DOXYCYCLINE 100 MG/1
100 CAPSULE ORAL 2 TIMES DAILY
Qty: 14 CAPSULE | Refills: 0 | Status: SHIPPED | OUTPATIENT
Start: 2022-06-15 | End: 2022-06-22

## 2022-06-16 LAB — GROUP A STREP BY PCR: NOT DETECTED

## 2022-06-16 NOTE — PROGRESS NOTES
Assessment & Plan:      Problem List Items Addressed This Visit    None     Visit Diagnoses     Acute non-recurrent maxillary sinusitis    -  Primary    Relevant Medications    fluticasone (FLONASE) 50 MCG/ACT nasal spray    doxycycline hyclate (VIBRAMYCIN) 100 MG capsule    Sore throat        Relevant Orders    Streptococcus A Rapid Screen w/Reflex to PCR - Clinic Collect (Completed)    Group A Streptococcus PCR Throat Swab        Medical Decision Making  Patient presents with acute worsening of symptoms of sinus congestion over the last 4 to 5 days following a recent cold-like illness.  Symptoms today appear concerning for bacterial sinusitis.  Recommend treatment with oral antibiotics as well as trial of nasal steroids.  Rapid strep is negative.  No signs of respiratory distress.  Continue to drink plenty of clear fluids and use honey.  Discussed signs of worsening symptoms and when to follow-up with PCP if no symptom improvement.     Subjective:      Gia Sultana is a 56 year old female here for evaluation of sinus congestion, throat pain, cough, and ear pains.  Onset of symptoms was 4 to 5 days ago.  Patient had cold-like symptoms even 1 to 2 weeks before this.  She felt like she was noticing gradual improvement of the cold until symptoms worsen again in the last 4 to 5 days.  Patient denies fevers and shortness of breath.     The following portions of the patient's history were reviewed and updated as appropriate: allergies, current medications, and problem list.     Review of Systems  Pertinent items are noted in HPI.    Allergies  Allergies   Allergen Reactions     Penicillins Unknown     Sulfa (Sulfonamide Antibiotics) [Sulfa Drugs] Rash     Nexium [Esomeprazole] Palpitations     Felt like BP and HR increased/palitations.       Family History   Problem Relation Age of Onset     Diabetes Mother      Obesity Mother      Dementia Father      Diabetes Brother      Obesity Brother        Social History      Tobacco Use     Smoking status: Current Some Day Smoker     Packs/day: 0.25     Types: Cigarettes, Cigarettes     Smokeless tobacco: Never Used   Substance Use Topics     Alcohol use: Yes     Alcohol/week: 0.0 - 5.0 standard drinks        Objective:      BP (!) 169/82   Pulse 94   Temp 98.5  F (36.9  C) (Oral)   Resp 20   Wt 112.5 kg (248 lb)   SpO2 99%   BMI 46.86 kg/m    General appearance - alert, well appearing, and in no distress and non-toxic  Ears - TMs intact with moderate thick mucoid fluid and bulging, no erythema  Nose - Tenderness to percussion over the left maxillary sinus  Mouth - Posterior pharynx does appear mildly erythematous, no exudate, mucous members moist  Neck - supple, no significant adenopathy  Chest - clear to auscultation, no wheezes, rales or rhonchi, symmetric air entry  Heart - normal rate, regular rhythm, normal S1, S2, no murmurs, rubs, clicks or gallops     Lab & Imaging Results    Results for orders placed or performed in visit on 06/15/22   Streptococcus A Rapid Screen w/Reflex to PCR - Clinic Collect     Status: Normal    Specimen: Throat; Swab   Result Value Ref Range    Group A Strep antigen Negative Negative       I personally reviewed these results and discussed findings with the patient.    The use of Dragon/Audiolife dictation services was used to construct the content of this note; any grammatical errors are non-intentional. Please contact the author directly if you are in need of any clarification.

## 2022-07-07 DIAGNOSIS — J01.00 ACUTE NON-RECURRENT MAXILLARY SINUSITIS: ICD-10-CM

## 2022-07-08 RX ORDER — FLUTICASONE PROPIONATE 50 MCG
2 SPRAY, SUSPENSION (ML) NASAL DAILY
Qty: 16 ML | Refills: 1 | Status: SHIPPED | OUTPATIENT
Start: 2022-07-08 | End: 2022-07-22

## 2022-07-08 NOTE — TELEPHONE ENCOUNTER
"Routing refill request to provider for review/approval because:  Drug not active on patient's medication list    Last Written Prescription Date:    Last Fill Quantity: ,  # refills:    Last office visit provider:  9/28/21     Requested Prescriptions   Pending Prescriptions Disp Refills     fluticasone (FLONASE) 50 MCG/ACT nasal spray [Pharmacy Med Name: FLUTICASONE PROP 50 MCG SPRAY] 16 mL      Sig: SPRAY 2 SPRAYS INTO BOTH NOSTRILS DAILY FOR 14 DAYS       Nasal Allergy Protocol Failed - 7/7/2022 11:32 AM        Failed - Medication is active on med list        Passed - Patient is age 12 or older        Passed - Recent (12 mo) or future (30 days) visit within the authorizing provider's specialty     Patient has had an office visit with the authorizing provider or a provider within the authorizing providers department within the previous 12 mos or has a future within next 30 days. See \"Patient Info\" tab in inbasket, or \"Choose Columns\" in Meds & Orders section of the refill encounter.                   Kristi Wren RN 07/08/22 1:06 PM  "

## 2022-07-24 ENCOUNTER — HEALTH MAINTENANCE LETTER (OUTPATIENT)
Age: 57
End: 2022-07-24

## 2022-09-26 DIAGNOSIS — I10 ESSENTIAL HYPERTENSION, MALIGNANT: ICD-10-CM

## 2022-09-26 NOTE — TELEPHONE ENCOUNTER
Pending Prescriptions:                       Disp   Refills    lisinopril-hydrochlorothiazide (ZESTORETI*90 tab*0            Sig: Take 1 tablet by mouth daily **OFFICE VISIT           REQUIRED FOR FURTHER REFILLS**    Medication last filled:1/20/22    Last clinic visit: 6/15/22 (not PCP)     Clinic visit frequency required: Q 6  months    Next clinic visit: 10/6/22- , 11/17/22 Dr. Mccracken.       Pt is wondering if she can have short supply to get her to her next appointment?

## 2022-09-27 RX ORDER — LISINOPRIL/HYDROCHLOROTHIAZIDE 10-12.5 MG
1 TABLET ORAL DAILY
Qty: 90 TABLET | Refills: 0 | Status: SHIPPED | OUTPATIENT
Start: 2022-09-27 | End: 2022-11-17

## 2022-10-02 ENCOUNTER — HEALTH MAINTENANCE LETTER (OUTPATIENT)
Age: 57
End: 2022-10-02

## 2022-10-06 ENCOUNTER — OFFICE VISIT (OUTPATIENT)
Dept: FAMILY MEDICINE | Facility: CLINIC | Age: 57
End: 2022-10-06
Payer: COMMERCIAL

## 2022-10-06 VITALS
SYSTOLIC BLOOD PRESSURE: 144 MMHG | DIASTOLIC BLOOD PRESSURE: 90 MMHG | OXYGEN SATURATION: 93 % | BODY MASS INDEX: 47.56 KG/M2 | HEIGHT: 61 IN | WEIGHT: 251.9 LBS | HEART RATE: 88 BPM | TEMPERATURE: 98.5 F

## 2022-10-06 DIAGNOSIS — I10 BENIGN ESSENTIAL HYPERTENSION: ICD-10-CM

## 2022-10-06 DIAGNOSIS — N30.01 ACUTE CYSTITIS WITH HEMATURIA: ICD-10-CM

## 2022-10-06 DIAGNOSIS — Z12.31 VISIT FOR SCREENING MAMMOGRAM: Primary | ICD-10-CM

## 2022-10-06 LAB
ALBUMIN UR-MCNC: ABNORMAL MG/DL
APPEARANCE UR: CLEAR
BACTERIA #/AREA URNS HPF: ABNORMAL /HPF
BILIRUB UR QL STRIP: NEGATIVE
COLOR UR AUTO: YELLOW
GLUCOSE UR STRIP-MCNC: NEGATIVE MG/DL
HGB UR QL STRIP: ABNORMAL
KETONES UR STRIP-MCNC: NEGATIVE MG/DL
LEUKOCYTE ESTERASE UR QL STRIP: ABNORMAL
MUCOUS THREADS #/AREA URNS LPF: PRESENT /LPF
NITRATE UR QL: NEGATIVE
PH UR STRIP: 5.5 [PH] (ref 5–8)
RBC #/AREA URNS AUTO: ABNORMAL /HPF
SP GR UR STRIP: 1.02 (ref 1–1.03)
SQUAMOUS #/AREA URNS AUTO: ABNORMAL /LPF
UROBILINOGEN UR STRIP-ACNC: 0.2 E.U./DL
WBC #/AREA URNS AUTO: ABNORMAL /HPF

## 2022-10-06 PROCEDURE — 81001 URINALYSIS AUTO W/SCOPE: CPT | Performed by: FAMILY MEDICINE

## 2022-10-06 PROCEDURE — 99214 OFFICE O/P EST MOD 30 MIN: CPT | Performed by: FAMILY MEDICINE

## 2022-10-06 RX ORDER — MAGNESIUM OXIDE 400 MG/1
TABLET ORAL
COMMUNITY
Start: 2021-03-29 | End: 2024-02-19

## 2022-10-06 RX ORDER — VENLAFAXINE HYDROCHLORIDE 150 MG/1
150 TABLET, EXTENDED RELEASE ORAL
COMMUNITY
End: 2022-10-06 | Stop reason: DRUGHIGH

## 2022-10-06 RX ORDER — OMEPRAZOLE 10 MG/1
10 CAPSULE, DELAYED RELEASE ORAL
COMMUNITY
End: 2022-11-17

## 2022-10-06 RX ORDER — NITROFURANTOIN MACROCRYSTAL 100 MG
100 CAPSULE ORAL 4 TIMES DAILY
Qty: 28 CAPSULE | Refills: 0 | Status: SHIPPED | OUTPATIENT
Start: 2022-10-06 | End: 2022-11-16

## 2022-10-06 RX ORDER — CETIRIZINE HYDROCHLORIDE 10 MG/1
10 TABLET ORAL
COMMUNITY

## 2022-10-06 ASSESSMENT — PAIN SCALES - GENERAL: PAINLEVEL: NO PAIN (0)

## 2022-10-06 NOTE — PROGRESS NOTES
"  Assessment & Plan     Visit for screening mammogram  This will be scheduled    Acute cystitis with hematuria  Patient has signs and symptoms of a low-grade smoldering subacute UTI and will treat as follows; I recommended cranberry pills on a daily basis    - UA Macro with Reflex to Micro and Culture - lab collect  - Urine Microscopic  - nitroFURantoin macrocrystal (MACRODANTIN) 100 MG capsule  Dispense: 28 capsule; Refill: 0    Benign essential hypertension  Patient should do HBP and  a new monitor twice daily for 1 month and return with readings for  during her physical.  No change in medication at this time               Nicotine/Tobacco Cessation:  She reports that she has been smoking cigarettes and cigarettes. She has been smoking about 0.25 packs per day. She has never used smokeless tobacco.  Nicotine/Tobacco Cessation Plan:   Information offered: Patient not interested at this time      BMI:   Estimated body mass index is 47.62 kg/m  as calculated from the following:    Height as of this encounter: 1.549 m (5' 0.98\").    Weight as of this encounter: 114.3 kg (251 lb 14.4 oz).   Weight management plan: Discussed healthy diet and exercise guidelines        No follow-ups on file.    Bimal Forde MD  [facility]    Alfonso Nielsen is a 56 year old, presenting for the following health issues:  Hypertension, Recheck Medication, and UTI      HPI Gia has had a lot of stress this year she lost her mother and her father has end-stage Alzheimer's.  He has been spending a lot of time in motels in the [place] area.  Her blood pressure is 140/90 here but I want her to get a 1 month diary of each BPs with a new blood pressure monitor and return in 1 month during her physical before any alteration of medication is considered.  She agrees with this she has a low smoldering UTI cystitis-like syndrome here and has had this in the past since her climacteric.  We discussed " "this.  I will treat her with Macrodantin at this point encourage hydration and daily cranberry tablets.  Very nice lady and we sympathize with her suffering.          Review of Systems   Constitutional, HEENT, cardiovascular, pulmonary, gi and gu systems are negative, except as otherwise noted.      Objective    BP (!) 144/90 (BP Location: Right arm, Patient Position: Sitting, Cuff Size: Adult Regular)   Pulse 88   Temp 98.5  F (36.9  C) (Oral)   Ht 1.549 m (5' 0.98\")   Wt 114.3 kg (251 lb 14.4 oz)   SpO2 93%   BMI 47.62 kg/m    Body mass index is 47.62 kg/m .  Physical Exam   GENERAL: healthy, alert and no distress  NECK: no adenopathy, no asymmetry, masses, or scars and thyroid normal to palpation  RESP: lungs clear to auscultation - no rales, rhonchi or wheezes  CV: regular rate and rhythm, normal S1 S2, no S3 or S4, no murmur, click or rub, no peripheral edema and peripheral pulses strong  ABDOMEN: soft, nontender, no hepatosplenomegaly, no masses and bowel sounds normal  MS: no gross musculoskeletal defects noted, no edema                    "

## 2022-10-21 ENCOUNTER — ANCILLARY PROCEDURE (OUTPATIENT)
Dept: GENERAL RADIOLOGY | Facility: CLINIC | Age: 57
End: 2022-10-21
Attending: PHYSICIAN ASSISTANT
Payer: COMMERCIAL

## 2022-10-21 ENCOUNTER — OFFICE VISIT (OUTPATIENT)
Dept: FAMILY MEDICINE | Facility: CLINIC | Age: 57
End: 2022-10-21
Payer: COMMERCIAL

## 2022-10-21 VITALS
BODY MASS INDEX: 47.45 KG/M2 | SYSTOLIC BLOOD PRESSURE: 125 MMHG | DIASTOLIC BLOOD PRESSURE: 80 MMHG | WEIGHT: 251 LBS | RESPIRATION RATE: 18 BRPM | OXYGEN SATURATION: 94 % | TEMPERATURE: 98.9 F

## 2022-10-21 DIAGNOSIS — L29.9 ITCHING: ICD-10-CM

## 2022-10-21 DIAGNOSIS — L24.5 IRRITANT CONTACT DERMATITIS DUE TO OTHER CHEMICAL PRODUCTS: ICD-10-CM

## 2022-10-21 DIAGNOSIS — R06.02 SHORTNESS OF BREATH: Primary | ICD-10-CM

## 2022-10-21 PROCEDURE — 99214 OFFICE O/P EST MOD 30 MIN: CPT | Performed by: PHYSICIAN ASSISTANT

## 2022-10-21 PROCEDURE — 71046 X-RAY EXAM CHEST 2 VIEWS: CPT | Mod: TC | Performed by: RADIOLOGY

## 2022-10-21 RX ORDER — PREDNISONE 20 MG/1
40 TABLET ORAL DAILY
Qty: 10 TABLET | Refills: 0 | Status: SHIPPED | OUTPATIENT
Start: 2022-10-21 | End: 2022-10-26

## 2022-10-21 NOTE — PROGRESS NOTES
Assessment & Plan:      Problem List Items Addressed This Visit    None  Visit Diagnoses     Shortness of breath    -  Primary    Relevant Medications    predniSONE (DELTASONE) 20 MG tablet    Other Relevant Orders    XR Chest 2 Views (Completed)    Itching        Irritant contact dermatitis due to other chemical products        Relevant Medications    predniSONE (DELTASONE) 20 MG tablet        Medical Decision Making  Patient presents with itchy skin and shortness of breath.  Chest x-ray is negative for focal pneumonia versus viral pneumonia.  Suspect patient is suffering from contact dermatitis secondary to exposure to Geraniol in powder form.  Recommend short course of oral steroids.  Continue with over-the-counter antihistamines and cold compresses.  Allergies and medication interactions reviewed.  Discussed signs of worsening symptoms and when to follow-up with PCP if no symptom improvement.     Subjective:      Gia Sultana is a 56 year old female here for evaluation of itchy rash over the skin and shortness of breath.  Onset of symptoms was 3 to 4 days ago.  Symptoms began 24 hours after patient was using a flea powder on her pets.  The container broke open and powder went all over and into the air.  The active ingredient in the flea powder is Geraniol.  Patient now notes severely itchy rash primarily over the hands, arms, and ankles.  She has been using Benadryl with only limited relief of the itchiness.  Also gets benefit with cold compresses.  She also developed a cough at that time and is noting shortness of breath.  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  Patient had negative COVID-19 test at home.  She denies fevers, sore throat, and body aches.     The following portions of the patient's history were reviewed and updated as appropriate: allergies, current medications, and problem list.     Review of Systems  Pertinent items are noted in  HPI.    Allergies  Allergies   Allergen Reactions     Penicillin G Other (See Comments)     Turned yellow     Penicillins Unknown     Sulfa (Sulfonamide Antibiotics) [Sulfa Drugs] Rash     Nexium [Esomeprazole] Palpitations     Felt like BP and HR increased/palitations.       Family History   Problem Relation Age of Onset     Diabetes Mother      Obesity Mother      Dementia Father      Diabetes Brother      Obesity Brother        Social History     Tobacco Use     Smoking status: Some Days     Packs/day: 0.25     Types: Cigarettes     Smokeless tobacco: Never   Substance Use Topics     Alcohol use: Yes     Alcohol/week: 0.0 - 5.0 standard drinks        Objective:      /80   Temp 98.9  F (37.2  C) (Oral)   Resp 18   Wt 113.9 kg (251 lb)   SpO2 94%   BMI 47.45 kg/m    General appearance - alert, well appearing, and in no distress and non-toxic  Chest - clear to auscultation, no wheezes, rales or rhonchi, symmetric air entry  Heart - normal rate, regular rhythm, normal S1, S2, no murmurs, rubs, clicks or gallops  Skin - Mildly erythematous linear scratch marks across the forearms and hands, otherwise no rash or lesions seen     Lab & Imaging Results    Results for orders placed or performed in visit on 10/21/22   XR Chest 2 Views     Status: None    Narrative    EXAM: XR CHEST 2 VIEWS  LOCATION: [facility]  DATE/TIME: 10/21/2022 3:44 PM    INDICATION: worsening shortness of breath  COMPARISON: None.      Impression    IMPRESSION: Negative chest.       I personally reviewed these results and discussed findings with the patient.    The use of Dragon/"[employer] dictation services was used to construct the content of this note; any grammatical errors are non-intentional. Please contact the author directly if you are in need of any clarification.

## 2022-11-17 ENCOUNTER — OFFICE VISIT (OUTPATIENT)
Dept: FAMILY MEDICINE | Facility: CLINIC | Age: 57
End: 2022-11-17
Payer: COMMERCIAL

## 2022-11-17 VITALS
SYSTOLIC BLOOD PRESSURE: 128 MMHG | WEIGHT: 242.3 LBS | HEART RATE: 83 BPM | OXYGEN SATURATION: 94 % | BODY MASS INDEX: 45.75 KG/M2 | HEIGHT: 61 IN | DIASTOLIC BLOOD PRESSURE: 84 MMHG | TEMPERATURE: 97.3 F | RESPIRATION RATE: 16 BRPM

## 2022-11-17 DIAGNOSIS — I10 ESSENTIAL HYPERTENSION, MALIGNANT: ICD-10-CM

## 2022-11-17 DIAGNOSIS — E66.01 MORBID OBESITY (H): ICD-10-CM

## 2022-11-17 DIAGNOSIS — Z00.00 ROUTINE PHYSICAL EXAMINATION: Primary | ICD-10-CM

## 2022-11-17 DIAGNOSIS — Z12.4 CERVICAL CANCER SCREENING: ICD-10-CM

## 2022-11-17 DIAGNOSIS — E78.5 DYSLIPIDEMIA: ICD-10-CM

## 2022-11-17 DIAGNOSIS — Z91.09 OTHER ALLERGY, OTHER THAN TO MEDICINAL AGENTS: ICD-10-CM

## 2022-11-17 DIAGNOSIS — R31.0 GROSS HEMATURIA: ICD-10-CM

## 2022-11-17 DIAGNOSIS — Z12.11 SCREENING FOR COLON CANCER: ICD-10-CM

## 2022-11-17 DIAGNOSIS — R79.89 LOW SERUM VITAMIN D: ICD-10-CM

## 2022-11-17 DIAGNOSIS — F41.1 GENERALIZED ANXIETY DISORDER: ICD-10-CM

## 2022-11-17 DIAGNOSIS — Z12.31 VISIT FOR SCREENING MAMMOGRAM: ICD-10-CM

## 2022-11-17 DIAGNOSIS — R10.11 RUQ ABDOMINAL PAIN: ICD-10-CM

## 2022-11-17 DIAGNOSIS — K76.89 HEPATIC CYST: ICD-10-CM

## 2022-11-17 DIAGNOSIS — K21.9 GASTROESOPHAGEAL REFLUX DISEASE WITHOUT ESOPHAGITIS: ICD-10-CM

## 2022-11-17 LAB
ALBUMIN SERPL BCG-MCNC: 5 G/DL (ref 3.5–5.2)
ALBUMIN UR-MCNC: NEGATIVE MG/DL
ALP SERPL-CCNC: 83 U/L (ref 35–104)
ALT SERPL W P-5'-P-CCNC: 26 U/L (ref 10–35)
ANION GAP SERPL CALCULATED.3IONS-SCNC: 15 MMOL/L (ref 7–15)
APPEARANCE UR: CLEAR
AST SERPL W P-5'-P-CCNC: 26 U/L (ref 10–35)
BACTERIA #/AREA URNS HPF: ABNORMAL /HPF
BILIRUB SERPL-MCNC: 0.4 MG/DL
BILIRUB UR QL STRIP: NEGATIVE
BUN SERPL-MCNC: 12.7 MG/DL (ref 6–20)
CALCIUM SERPL-MCNC: 9.9 MG/DL (ref 8.6–10)
CHLORIDE SERPL-SCNC: 98 MMOL/L (ref 98–107)
CHOLEST SERPL-MCNC: 240 MG/DL
COLOR UR AUTO: YELLOW
CREAT SERPL-MCNC: 0.84 MG/DL (ref 0.51–0.95)
DEPRECATED CALCIDIOL+CALCIFEROL SERPL-MC: 32 UG/L (ref 20–75)
DEPRECATED HCO3 PLAS-SCNC: 22 MMOL/L (ref 22–29)
ERYTHROCYTE [DISTWIDTH] IN BLOOD BY AUTOMATED COUNT: 12.9 % (ref 10–15)
GFR SERPL CREATININE-BSD FRML MDRD: 81 ML/MIN/1.73M2
GLUCOSE SERPL-MCNC: 102 MG/DL (ref 70–99)
GLUCOSE UR STRIP-MCNC: NEGATIVE MG/DL
HCT VFR BLD AUTO: 44.3 % (ref 35–47)
HDLC SERPL-MCNC: 43 MG/DL
HGB BLD-MCNC: 14.9 G/DL (ref 11.7–15.7)
HGB UR QL STRIP: ABNORMAL
KETONES UR STRIP-MCNC: NEGATIVE MG/DL
LDLC SERPL CALC-MCNC: 157 MG/DL
LEUKOCYTE ESTERASE UR QL STRIP: NEGATIVE
MCH RBC QN AUTO: 27.8 PG (ref 26.5–33)
MCHC RBC AUTO-ENTMCNC: 33.6 G/DL (ref 31.5–36.5)
MCV RBC AUTO: 83 FL (ref 78–100)
NITRATE UR QL: NEGATIVE
NONHDLC SERPL-MCNC: 197 MG/DL
PH UR STRIP: 5 [PH] (ref 5–8)
PLATELET # BLD AUTO: 336 10E3/UL (ref 150–450)
POTASSIUM SERPL-SCNC: 3.9 MMOL/L (ref 3.4–5.3)
PROT SERPL-MCNC: 7.4 G/DL (ref 6.4–8.3)
RBC # BLD AUTO: 5.36 10E6/UL (ref 3.8–5.2)
RBC #/AREA URNS AUTO: ABNORMAL /HPF
SODIUM SERPL-SCNC: 135 MMOL/L (ref 136–145)
SP GR UR STRIP: 1.02 (ref 1–1.03)
SQUAMOUS #/AREA URNS AUTO: ABNORMAL /LPF
TRIGL SERPL-MCNC: 201 MG/DL
UROBILINOGEN UR STRIP-ACNC: 0.2 E.U./DL
WBC # BLD AUTO: 7.2 10E3/UL (ref 4–11)
WBC #/AREA URNS AUTO: ABNORMAL /HPF

## 2022-11-17 PROCEDURE — 91312 COVID-19,PF,PFIZER BOOSTER BIVALENT: CPT | Performed by: FAMILY MEDICINE

## 2022-11-17 PROCEDURE — 90472 IMMUNIZATION ADMIN EACH ADD: CPT | Performed by: FAMILY MEDICINE

## 2022-11-17 PROCEDURE — 99214 OFFICE O/P EST MOD 30 MIN: CPT | Mod: 25 | Performed by: FAMILY MEDICINE

## 2022-11-17 PROCEDURE — 99396 PREV VISIT EST AGE 40-64: CPT | Mod: 25 | Performed by: FAMILY MEDICINE

## 2022-11-17 PROCEDURE — 85027 COMPLETE CBC AUTOMATED: CPT | Performed by: FAMILY MEDICINE

## 2022-11-17 PROCEDURE — 90471 IMMUNIZATION ADMIN: CPT | Performed by: FAMILY MEDICINE

## 2022-11-17 PROCEDURE — 36415 COLL VENOUS BLD VENIPUNCTURE: CPT | Performed by: FAMILY MEDICINE

## 2022-11-17 PROCEDURE — 80053 COMPREHEN METABOLIC PANEL: CPT | Performed by: FAMILY MEDICINE

## 2022-11-17 PROCEDURE — 87624 HPV HI-RISK TYP POOLED RSLT: CPT | Performed by: FAMILY MEDICINE

## 2022-11-17 PROCEDURE — 0124A COVID-19,PF,PFIZER BOOSTER BIVALENT: CPT | Performed by: FAMILY MEDICINE

## 2022-11-17 PROCEDURE — G0145 SCR C/V CYTO,THINLAYER,RESCR: HCPCS | Performed by: FAMILY MEDICINE

## 2022-11-17 PROCEDURE — 90677 PCV20 VACCINE IM: CPT | Performed by: FAMILY MEDICINE

## 2022-11-17 PROCEDURE — 81001 URINALYSIS AUTO W/SCOPE: CPT | Performed by: FAMILY MEDICINE

## 2022-11-17 PROCEDURE — 90682 RIV4 VACC RECOMBINANT DNA IM: CPT | Performed by: FAMILY MEDICINE

## 2022-11-17 PROCEDURE — 80061 LIPID PANEL: CPT | Performed by: FAMILY MEDICINE

## 2022-11-17 PROCEDURE — 82306 VITAMIN D 25 HYDROXY: CPT | Performed by: FAMILY MEDICINE

## 2022-11-17 RX ORDER — VENLAFAXINE HYDROCHLORIDE 75 MG/1
75 CAPSULE, EXTENDED RELEASE ORAL DAILY
Qty: 90 CAPSULE | Refills: 4 | Status: SHIPPED | OUTPATIENT
Start: 2022-11-17 | End: 2023-12-06

## 2022-11-17 RX ORDER — LISINOPRIL/HYDROCHLOROTHIAZIDE 10-12.5 MG
1 TABLET ORAL DAILY
Qty: 90 TABLET | Refills: 4 | Status: SHIPPED | OUTPATIENT
Start: 2022-11-17 | End: 2024-02-07

## 2022-11-17 RX ORDER — OMEPRAZOLE 10 MG/1
10 CAPSULE, DELAYED RELEASE ORAL DAILY
Qty: 90 CAPSULE | Refills: 4 | Status: SHIPPED | OUTPATIENT
Start: 2022-11-17 | End: 2022-12-02

## 2022-11-17 ASSESSMENT — ENCOUNTER SYMPTOMS
CHILLS: 0
PARESTHESIAS: 1
CONSTIPATION: 0
SHORTNESS OF BREATH: 0
FREQUENCY: 0
BREAST MASS: 0
NERVOUS/ANXIOUS: 0
DYSURIA: 0
NAUSEA: 0
HEARTBURN: 1
ARTHRALGIAS: 0
COUGH: 0
WEAKNESS: 0
DIZZINESS: 0
PALPITATIONS: 0
HEADACHES: 0
ABDOMINAL PAIN: 0
DIARRHEA: 0
MYALGIAS: 0
HEMATOCHEZIA: 0
SORE THROAT: 0
HEMATURIA: 0
EYE PAIN: 0
JOINT SWELLING: 1
FEVER: 0

## 2022-11-17 ASSESSMENT — PAIN SCALES - GENERAL: PAINLEVEL: NO PAIN (0)

## 2022-11-20 NOTE — PROGRESS NOTES
Assessment/Plan:     Routine physical examination  Encouraged healthy lifestyle habits including regular exercise, healthy eating habits, and adequate calcium and vitamin D intake.  Will await Pap smear screening results.  Order placed for mammogram.  Order placed for colonoscopy which will be due next summer.  She does not meet criteria for lung cancer screening.  PCV 20, flu vaccine, and COVID booster administered today.  Consider Shingrix and hepatitis B vaccine.  Information provided regarding advance healthcare directives.    Cervical cancer screening  - Pap Screen with HPV - recommended age 30 - 65 years    Visit for screening mammogram  - MA SCREENING DIGITAL BILAT - Future  (s+30); Future    Screening for colon cancer  Due June 2023  - Colonoscopy Screening  Referral; Future    Morbid obesity (H)  Encourage efforts at healthy lifestyle habits.    Essential hypertension, malignant  Adequately controlled on lisinopril hydrochlorothiazide.  Continue.  Advised excellent healthy lifestyle habits.  We will check comprehensive metabolic panel today to assess kidney function and electrolytes.  - lisinopril-hydrochlorothiazide (ZESTORETIC) 10-12.5 MG tablet; Take 1 tablet by mouth daily    Dyslipidemia  Encouraged efforts at healthy lifestyle habits.  We will check fasting lipids.  - Lipid panel reflex to direct LDL Fasting; Future  - Lipid panel reflex to direct LDL Fasting    Generalized anxiety disorder  Adequately trolled on venlafaxine daily.  Continue.  - venlafaxine (EFFEXOR XR) 75 MG 24 hr capsule; Take 1 capsule (75 mg) by mouth daily    Low serum vitamin D  Encouraged daily supplement.  Check vitamin D level today.  - Vitamin D Deficiency; Future  - Vitamin D Deficiency    Allergies  Continue cetirizine, may augment with Benadryl right now for itching if needed.    Gastroesophageal reflux disease without esophagitis  Stable on omeprazole daily, unsuccessful in attempts to wean.  - omeprazole  (PRILOSEC) 10 MG DR capsule; Take 1 capsule (10 mg) by mouth daily    Gross hematuria  Will assess urinalysis today to further evaluate, especially in light of her hypertension.  We will check CBC as well.  - CBC with platelets; Future  - UA reflex to Microscopic and Culture; Future  - CBC with platelets  - UA reflex to Microscopic and Culture  - Urine Microscopic    RUQ abdominal pain  Will assess further with CBC and comprehensive metabolic panel.  Unclear if esophageal reflux may be contributing.  No hepatic cyst, I am not suspicious that this is the cause.  Because she has some tenderness in the right upper quadrant we will also reassess with right upper quadrant ultrasound.  Continue omeprazole.  - CBC with platelets; Future  - Comprehensive metabolic panel; Future  - US Abdomen Limited; Future  - CBC with platelets  - Comprehensive metabolic panel    Hepatic cyst  It is unlikely that this is contribute to her current symptoms, we will reassess this on ultrasound is noted.  - Comprehensive metabolic panel; Future  - US Abdomen Limited; Future  - Comprehensive metabolic panel       There are no Patient Instructions on file for this visit.     No follow-ups on file.         Subjective:     Gia Sultana is a 56 year old female who presents for an annual exam.     She is here today for her annual preventive care visit.  On October 21 she was seen in urgent care and started on prednisone for intense itching.  Return for a few days here and there, managed with Zyrtec and Benadryl.  After that episode she developed upper respiratory infection symptoms and stomach irritation which she attributed to Mucinex intake.  Sometime getting a sense of food getting stuck in the mid chest and noting discomfort in the right upper quadrant.  Notes occasionally her urine is very dark she will sometimes have some bright red blood in her urine.  Upper respiratory infection has resolved following previous UTI treatment.    History  of dyslipidemia due for follow-up.  Hypertension managed with lisinopril hydrochlorothiazide.  Denies lightheadedness, dizziness, headaches, chest pain, dyspnea, or swelling.  Anxiety doing very well venlafaxine.  She feels this was helpful in managing recent health changes in her father at loss of her mother in July.  History of vitamin D deficiency that is due for follow-up.  History of esophageal reflux for which she takes omeprazole daily and has been unsuccessful in weaning.  Continues to smoke 1/4 pack of cigarettes daily, especially when she is drinking coffee or alcohol.  She is working on reducing.  Notes that she had an MRI of her knee a year ago, was told she will need half knee replacement.  Notes the pain is worse with heat or humidity.  So far she is managing.  No regular exercise but plans to initiate walking.  She is working to reduce amount of carbohydrates and increase protein in meals, has replaced lunch with a protein shake which is been helpful.  She has lost about 10 pounds over the last month or so.    Healthy Habits:     Getting at least 3 servings of Calcium per day:  Yes    Bi-annual eye exam:  Yes    Dental care twice a year:  Yes    Sleep apnea or symptoms of sleep apnea:  Daytime drowsiness and Excessive snoring    Diet:  Regular (no restrictions)    Frequency of exercise:  None    Taking medications regularly:  Yes    Medication side effects:  None    PHQ-2 Total Score: 1    Additional concerns today:  No      Healthy Habits:   Healthy Diet: Yes  Regular Exercise: No  Sunscreen Use: Yes  Dental Visits Regularly: Yes  Seat Belt: Yes  Domestic abuse:   No    Health Maintenance reviewed:  Lipid Profile: Due today  Glucose Screen: Due today  Colonoscopy: 6/20/2013, follow-up 10 years  Mammogram: 9/29/2017, due    Gynecologic History  No LMP recorded. Patient is postmenopausal.  Contraception: post menopausal status  Last Pap: 8/14/2015. Results were: Normal, HPV negative        Immunization  History   Administered Date(s) Administered     COVID-19,PF,Pfizer (12+ Yrs) 2021, 2021     COVID-19,PF,Pfizer 12+ YRS BIVALENT Booster 2022     COVID-19,PF,Pfizer 12+ Yrs ( and After) 2022     Influenza Quad, Recombinant, pf(RIV4) (Flublok) 2022     Influenza Vaccine, 6+MO IM (QUADRIVALENT W/PRESERVATIVES) 2009, 10/11/2010, 2012     Pneumococcal 20 valent Conjugate (Prevnar 20) 2022     TD (ADULT, 7+) 01/15/2020     Td (Adult), Adsorbed 2009     Tdap (Adacel,Boostrix) 2009     Immunization status: Due for PCV 20, COVID booster, seasonal influenza vaccine, Shingrix, hepatitis B vaccine.    Current Outpatient Medications   Medication Sig Dispense Refill     aspirin 81 MG EC tablet [ASPIRIN 81 MG EC TABLET] Take 81 mg by mouth daily.       cetirizine (ZYRTEC) 10 MG tablet Take 10 mg by mouth       lisinopril-hydrochlorothiazide (ZESTORETIC) 10-12.5 MG tablet Take 1 tablet by mouth daily 90 tablet 4     magnesium oxide (MAG-OX) 400 MG tablet [MAGNESIUM OXIDE (MAG-OX) 400 MG (241.3 MG MAGNESIUM) TABLET] TAKE 1 TABLET BY MOUTH EVERY DAY       magnesium oxide (MAG-OX) 400 MG tablet TAKE 1 TABLET BY MOUTH EVERY DAY 90 tablet 5     omeprazole (PRILOSEC) 10 MG DR capsule Take 1 capsule (10 mg) by mouth daily 90 capsule 4     venlafaxine (EFFEXOR XR) 75 MG 24 hr capsule Take 1 capsule (75 mg) by mouth daily 90 capsule 4     Past Medical History:   Diagnosis Date     Cholelithiasis      Depression      GERD (gastroesophageal reflux disease)      Hypertension      Past Surgical History:   Procedure Laterality Date      SECTION       CHOLECYSTECTOMY       COLONOSCOPY      one polyp removed.     [facility]  DELIVERY ONLY      Description:  Section;  Recorded: 10/16/2008;  Comments: x2     [facility] LAP,CHOLECYSTECTOMY/EXPLORE      Description: Cholecystectomy Laparoscopic;  Recorded: 04/15/2008;     [facility] REVISN TRACHEA,CERVICAL      Description:  "Tracheoplasty Cervical;  Recorded: 10/16/2008;  Comments: Trach inserted as a child     Penicillin g, Penicillins, Sulfa (sulfonamide antibiotics) [sulfa drugs], and Nexium [esomeprazole]  Family History   Problem Relation Age of Onset     Diabetes Mother      Obesity Mother      Dementia Father      Diabetes Brother      Obesity Brother      Social History     Socioeconomic History     Marital status:      Spouse name: Not on file     Number of children: Not on file     Years of education: Not on file     Highest education level: Not on file   Occupational History     Not on file   Tobacco Use     Smoking status: Some Days     Packs/day: 0.25     Types: Cigarettes     Smokeless tobacco: Never   Vaping Use     Vaping Use: Never used   Substance and Sexual Activity     Alcohol use: Yes     Alcohol/week: 0.0 - 5.0 standard drinks     Drug use: No     Sexual activity: Yes     Partners: Male     Comment: vasectomy in    Other Topics Concern     Not on file   Social History Narrative     Not on file     Social Determinants of Health     Financial Resource Strain: Not on file   Food Insecurity: Not on file   Transportation Needs: Not on file   Physical Activity: Not on file   Stress: Not on file   Social Connections: Not on file   Intimate Partner Violence: Not on file   Housing Stability: Not on file       Review of Systems  General:  Denies problem  Eyes: Denies problem  Ears/Nose/Throat: Denies problem  Cardiovascular: Denies problem  Respiratory:  Denies problem  Gastrointestinal:  Denies problem   Genitourinary: Denies problem  Musculoskeletal:  Denies problem       Objective:     /84   Pulse 83   Temp 97.3  F (36.3  C) (Oral)   Resp 16   Ht 1.537 m (5' 0.5\")   Wt 109.9 kg (242 lb 4.8 oz)   SpO2 94%   BMI 46.54 kg/m     Body mass index is 46.54 kg/m .     Physical Examination: General appearance - alert, well appearing, and in no distress, oriented to person, place, and time and normal " appearing weight  Mental status - alert, oriented to person, place, and time, normal mood, behavior, speech, dress, motor activity, and thought processes  Eyes - pupils equal and reactive, extraocular eye movements intact  Ears - bilateral TM's and external ear canals normal  Nose - normal and patent, no erythema, discharge or polyps  Mouth - mucous membranes moist, pharynx normal without lesions  Neck - supple, no significant adenopathy  Lymphatics - no palpable lymphadenopathy, no hepatosplenomegaly  Chest - clear to auscultation, no wheezes, rales or rhonchi, symmetric air entry  Heart - normal rate, regular rhythm, normal S1, S2, no murmurs, rubs, clicks or gallops  Abdomen - soft, nontender, nondistended, no masses or organomegaly  Breasts - breasts appear normal, no suspicious masses, no skin or nipple changes or axillary nodes  Neurological - alert, oriented, normal speech, no focal findings or movement disorder noted  Musculoskeletal - no joint tenderness, deformity or swelling  Extremities - peripheral pulses normal, no pedal edema, no clubbing or cyanosis  Skin - normal coloration and turgor, no rashes, no suspicious skin lesions noted        Office Visit on 11/17/2022   Component Date Value Ref Range Status     WBC Count 11/17/2022 7.2  4.0 - 11.0 10e3/uL Final     RBC Count 11/17/2022 5.36 (H)  3.80 - 5.20 10e6/uL Final     Hemoglobin 11/17/2022 14.9  11.7 - 15.7 g/dL Final     Hematocrit 11/17/2022 44.3  35.0 - 47.0 % Final     MCV 11/17/2022 83  78 - 100 fL Final     MCH 11/17/2022 27.8  26.5 - 33.0 pg Final     MCHC 11/17/2022 33.6  31.5 - 36.5 g/dL Final     RDW 11/17/2022 12.9  10.0 - 15.0 % Final     Platelet Count 11/17/2022 336  150 - 450 10e3/uL Final     Cholesterol 11/17/2022 240 (H)  <200 mg/dL Final     Triglycerides 11/17/2022 201 (H)  <150 mg/dL Final     Direct Measure HDL 11/17/2022 43 (L)  >=50 mg/dL Final     LDL Cholesterol Calculated 11/17/2022 157 (H)  <=100 mg/dL Final     Non HDL  Cholesterol 11/17/2022 197 (H)  <130 mg/dL Final     Vitamin D, Total (25-Hydroxy) 11/17/2022 32  20 - 75 ug/L Final     Sodium 11/17/2022 135 (L)  136 - 145 mmol/L Final     Potassium 11/17/2022 3.9  3.4 - 5.3 mmol/L Final     Chloride 11/17/2022 98  98 - 107 mmol/L Final     Carbon Dioxide (CO2) 11/17/2022 22  22 - 29 mmol/L Final     Anion Gap 11/17/2022 15  7 - 15 mmol/L Final     Urea Nitrogen 11/17/2022 12.7  6.0 - 20.0 mg/dL Final     Creatinine 11/17/2022 0.84  0.51 - 0.95 mg/dL Final     Calcium 11/17/2022 9.9  8.6 - 10.0 mg/dL Final     Glucose 11/17/2022 102 (H)  70 - 99 mg/dL Final     Alkaline Phosphatase 11/17/2022 83  35 - 104 U/L Final     AST 11/17/2022 26  10 - 35 U/L Final     ALT 11/17/2022 26  10 - 35 U/L Final     Protein Total 11/17/2022 7.4  6.4 - 8.3 g/dL Final     Albumin 11/17/2022 5.0  3.5 - 5.2 g/dL Final     Bilirubin Total 11/17/2022 0.4  <=1.2 mg/dL Final     GFR Estimate 11/17/2022 81  >60 mL/min/1.73m2 Final    Effective December 21, 2021 eGFRcr in adults is calculated using the 2021 CKD-EPI creatinine equation which includes age and gender (Marbin et al., NEJ, DOI: 10.1056/EQTFra8895931)     Color Urine 11/17/2022 Yellow  Colorless, Straw, Light Yellow, Yellow Final     Appearance Urine 11/17/2022 Clear  Clear Final     Glucose Urine 11/17/2022 Negative  Negative mg/dL Final     Bilirubin Urine 11/17/2022 Negative  Negative Final     Ketones Urine 11/17/2022 Negative  Negative mg/dL Final     Specific Gravity Urine 11/17/2022 1.025  1.005 - 1.030 Final     Blood Urine 11/17/2022 Trace (A)  Negative Final     pH Urine 11/17/2022 5.0  5.0 - 8.0 Final     Protein Albumin Urine 11/17/2022 Negative  Negative mg/dL Final     Urobilinogen Urine 11/17/2022 0.2  0.2, 1.0 E.U./dL Final     Nitrite Urine 11/17/2022 Negative  Negative Final     Leukocyte Esterase Urine 11/17/2022 Negative  Negative Final     Bacteria Urine 11/17/2022 Few (A)  None Seen /HPF Final     RBC Urine 11/17/2022 None  Seen  0-2 /HPF /HPF Final     WBC Urine 11/17/2022 0-5  0-5 /HPF /HPF Final     Squamous Epithelials Urine 11/17/2022 Few (A)  None Seen /LPF Final       Answers for HPI/ROS submitted by the patient on 11/17/2022  abdominal pain: No  Blood in stool: No  Blood in urine: No  chest pain: No  chills: No  congestion: No  constipation: No  cough: No  diarrhea: No  dizziness: No  ear pain: No  eye pain: No  nervous/anxious: No  fever: No  frequency: No  genital sores: No  headaches: No  hearing loss: No  heartburn: Yes  arthralgias: No  joint swelling: Yes  peripheral edema: No  mood changes: No  myalgias: No  nausea: No  dysuria: No  palpitations: No  Skin sensation changes: Yes  sore throat: No  urgency: No  rash: No  shortness of breath: No  visual disturbance: No  weakness: No  pelvic pain: No  vaginal bleeding: No  vaginal discharge: No  tenderness: No  breast mass: No  breast discharge: No

## 2022-11-22 ENCOUNTER — HOSPITAL ENCOUNTER (OUTPATIENT)
Dept: ULTRASOUND IMAGING | Facility: CLINIC | Age: 57
Discharge: HOME | End: 2022-11-22
Attending: FAMILY MEDICINE | Admitting: FAMILY MEDICINE
Payer: COMMERCIAL

## 2022-11-22 DIAGNOSIS — K76.89 HEPATIC CYST: ICD-10-CM

## 2022-11-22 DIAGNOSIS — R10.11 RUQ ABDOMINAL PAIN: ICD-10-CM

## 2022-11-22 LAB
BKR LAB AP GYN ADEQUACY: NORMAL
BKR LAB AP GYN INTERPRETATION: NORMAL
BKR LAB AP HPV REFLEX: NORMAL
BKR LAB AP PREVIOUS ABNORMAL: NORMAL
PATH REPORT.COMMENTS IMP SPEC: NORMAL
PATH REPORT.COMMENTS IMP SPEC: NORMAL
PATH REPORT.RELEVANT HX SPEC: NORMAL

## 2022-11-22 PROCEDURE — 76705 ECHO EXAM OF ABDOMEN: CPT

## 2022-11-25 LAB
HUMAN PAPILLOMA VIRUS 16 DNA: NEGATIVE
HUMAN PAPILLOMA VIRUS 18 DNA: NEGATIVE
HUMAN PAPILLOMA VIRUS FINAL DIAGNOSIS: NORMAL
HUMAN PAPILLOMA VIRUS OTHER HR: NEGATIVE

## 2022-11-28 DIAGNOSIS — K21.9 GASTROESOPHAGEAL REFLUX DISEASE WITHOUT ESOPHAGITIS: ICD-10-CM

## 2022-11-29 NOTE — TELEPHONE ENCOUNTER
"Routing refill request to provider for review/approval because:  Drug not active on patient's medication list, looks like it was discontinued    Last Written Prescription Date:  NA  Last Fill Quantity: NA,  # refills: NA   Last office visit provider:  11/17/2022     Requested Prescriptions   Pending Prescriptions Disp Refills     omeprazole (PRILOSEC) 20 MG DR capsule [Pharmacy Med Name: OMEPRAZOLE DR 20 MG CAPSULE] 90 capsule 0     Sig: TAKE 1 CAPSULE (20 MG) BY MOUTH DAILY **OFFICE VISIT REQUIRED FOR FURTHER REFILLS**       PPI Protocol Passed - 11/28/2022  2:40 PM        Passed - Not on Clopidogrel (unless Pantoprazole ordered)        Passed - No diagnosis of osteoporosis on record        Passed - Recent (12 mo) or future (30 days) visit within the authorizing provider's specialty     Patient has had an office visit with the authorizing provider or a provider within the authorizing providers department within the previous 12 mos or has a future within next 30 days. See \"Patient Info\" tab in inbasket, or \"Choose Columns\" in Meds & Orders section of the refill encounter.              Passed - Medication is active on med list        Passed - Patient is age 18 or older        Passed - No active pregnacy on record        Passed - No positive pregnancy test in past 12 months             Ashlyn Luke RN 11/29/22 4:37 PM  "

## 2022-11-30 NOTE — TELEPHONE ENCOUNTER
Please call patient to clarify whether she is taking 10 mg or 20 mg of omeprazole daily so I can send in the appropriate dose prescription.  VJ

## 2022-12-01 NOTE — TELEPHONE ENCOUNTER
Call placed to patient.     Please inquire about the dose of omeprazole when patient calls back. Please route to Dr. Mccracken directly.

## 2022-12-02 NOTE — TELEPHONE ENCOUNTER
Patient calling in regards to VM below.  Reporting that she takes one 20 mg tablet daily of the Omeprazole.    Any additional questions, patient can be reached at [phone].

## 2023-03-06 ENCOUNTER — TRANSFERRED RECORDS (OUTPATIENT)
Dept: HEALTH INFORMATION MANAGEMENT | Facility: CLINIC | Age: 58
End: 2023-03-06

## 2023-03-28 ENCOUNTER — TRANSFERRED RECORDS (OUTPATIENT)
Dept: HEALTH INFORMATION MANAGEMENT | Facility: CLINIC | Age: 58
End: 2023-03-28

## 2023-06-06 ENCOUNTER — TRANSFERRED RECORDS (OUTPATIENT)
Dept: HEALTH INFORMATION MANAGEMENT | Facility: CLINIC | Age: 58
End: 2023-06-06
Payer: COMMERCIAL

## 2023-08-12 ENCOUNTER — HEALTH MAINTENANCE LETTER (OUTPATIENT)
Age: 58
End: 2023-08-12

## 2023-10-17 PROBLEM — K21.9 GASTROESOPHAGEAL REFLUX DISEASE WITHOUT ESOPHAGITIS: Status: ACTIVE | Noted: 2023-10-17

## 2023-10-18 ENCOUNTER — PATIENT OUTREACH (OUTPATIENT)
Dept: CARE COORDINATION | Facility: CLINIC | Age: 58
End: 2023-10-18
Payer: COMMERCIAL

## 2023-10-18 ENCOUNTER — OFFICE VISIT (OUTPATIENT)
Dept: FAMILY MEDICINE | Facility: CLINIC | Age: 58
End: 2023-10-18
Payer: OTHER MISCELLANEOUS

## 2023-10-18 VITALS
DIASTOLIC BLOOD PRESSURE: 82 MMHG | HEART RATE: 90 BPM | SYSTOLIC BLOOD PRESSURE: 120 MMHG | WEIGHT: 254.2 LBS | TEMPERATURE: 97.3 F | BODY MASS INDEX: 47.99 KG/M2 | RESPIRATION RATE: 18 BRPM | HEIGHT: 61 IN | OXYGEN SATURATION: 95 %

## 2023-10-18 DIAGNOSIS — Z01.818 PREOP GENERAL PHYSICAL EXAM: Primary | ICD-10-CM

## 2023-10-18 DIAGNOSIS — Z98.890: ICD-10-CM

## 2023-10-18 DIAGNOSIS — E78.5 DYSLIPIDEMIA: ICD-10-CM

## 2023-10-18 DIAGNOSIS — I10 ESSENTIAL HYPERTENSION: ICD-10-CM

## 2023-10-18 DIAGNOSIS — M94.261 CHONDROMALACIA OF KNEE, RIGHT: ICD-10-CM

## 2023-10-18 DIAGNOSIS — S83.241A TEAR OF MEDIAL MENISCUS OF RIGHT KNEE, CURRENT, UNSPECIFIED TEAR TYPE, INITIAL ENCOUNTER: ICD-10-CM

## 2023-10-18 DIAGNOSIS — E66.01 MORBID OBESITY (H): ICD-10-CM

## 2023-10-18 DIAGNOSIS — K21.9 GASTROESOPHAGEAL REFLUX DISEASE WITHOUT ESOPHAGITIS: ICD-10-CM

## 2023-10-18 LAB
ANION GAP SERPL CALCULATED.3IONS-SCNC: 15 MMOL/L (ref 7–15)
BUN SERPL-MCNC: 15.9 MG/DL (ref 6–20)
CALCIUM SERPL-MCNC: 10.1 MG/DL (ref 8.6–10)
CHLORIDE SERPL-SCNC: 97 MMOL/L (ref 98–107)
CREAT SERPL-MCNC: 0.88 MG/DL (ref 0.51–0.95)
DEPRECATED HCO3 PLAS-SCNC: 24 MMOL/L (ref 22–29)
EGFRCR SERPLBLD CKD-EPI 2021: 76 ML/MIN/1.73M2
GLUCOSE SERPL-MCNC: 94 MG/DL (ref 70–99)
HGB BLD-MCNC: 14.9 G/DL (ref 11.7–15.7)
POTASSIUM SERPL-SCNC: 4.4 MMOL/L (ref 3.4–5.3)
SODIUM SERPL-SCNC: 136 MMOL/L (ref 135–145)

## 2023-10-18 PROCEDURE — 99214 OFFICE O/P EST MOD 30 MIN: CPT | Performed by: FAMILY MEDICINE

## 2023-10-18 PROCEDURE — 36415 COLL VENOUS BLD VENIPUNCTURE: CPT | Performed by: FAMILY MEDICINE

## 2023-10-18 PROCEDURE — 80048 BASIC METABOLIC PNL TOTAL CA: CPT | Performed by: FAMILY MEDICINE

## 2023-10-18 PROCEDURE — 85018 HEMOGLOBIN: CPT | Performed by: FAMILY MEDICINE

## 2023-10-18 ASSESSMENT — PAIN SCALES - GENERAL: PAINLEVEL: NO PAIN (0)

## 2023-10-18 NOTE — PROGRESS NOTES
[facility]  [address] [facility] [address]  [facility] [address]  Phone: [phone]  Fax: [phone]  Primary Provider: Mari Pereira  Pre-op Performing Provider: MARI PEREIRA      PREOPERATIVE EVALUATION:  Today's date: 10/18/2023    Gia is a 57 year old female who presents for a preoperative evaluation.      Surgical Information:  Surgery/Procedure: Right knee  Surgery Location: [facility]  Surgeon: Dr Mackey  Surgery Date: 10-23-23  Time of Surgery:   Where patient plans to recover: At home with family  Fax number for surgical facility:     Assessment & Plan     The proposed surgical procedure is considered LOW risk.    Preop general physical exam  Surgical risks include controlled hypertension, stable dyslipidemia, obesity with BMI of 48, stable reflux, history of tracheoplasty due to tracheotomy as an infant.  At this time her health conditions are optimally medically managed.  She may proceed with surgery as scheduled without further clinical clarification or evaluation and may proceed with choice of anesthesia.  We will check renal function and electrolytes and her hemoglobin today preoperatively.  - Hemoglobin; Future  - Basic metabolic panel; Future    Tear of medial meniscus of right knee, current, unspecified tear type, initial encounter  Chondromalacia of knee, right  To proceed with surgery as noted.    Hypertension  Controlled.  Hold lisinopril hydrochlorothiazide morning of surgery.    Dyslipidemia  Stable, not requiring medication or intervention.    Gastroesophageal reflux disease without esophagitis  Stable and controlled on omeprazole.  If requiring NSAIDs postoperatively, would recommend increasing omeprazole to twice daily.    Morbid obesity (H) with BMI 48  Stable.    Status post tracheoplasty  Stable.      - No identified additional risk factors other than previously addressed    Antiplatelet or Anticoagulation Medication Instructions:   - Patient is on no  antiplatelet or anticoagulation medications.    Additional Medication Instructions:  Patient is to take all scheduled medications on the day of surgery EXCEPT for modifications listed below:  Do not take lisinopril/hydrochlorothiazide morning of surgery.    RECOMMENDATION:  APPROVAL GIVEN to proceed with proposed procedure, without further diagnostic evaluation.            Subjective       HPI related to upcoming procedure: Injury 3/6/2023 at work when she tripped over snow boots twisting her knee.  Persisting pain and dysfunction, MRI indicating medial meniscus root tear along with medial compartment chondromalacia.  Has failed conservative measures including physical therapy, injections, ice, anti-inflammatories.  Requiring definitive treatment.    History of hypertension, this is controlled on lisinopril hydrochlorothiazide.  History of dyslipidemia managed with diet.  She struggles with chronic obesity and unfortunately this injury and reduced mobility has contributed to mild weight gain, continues to work on healthy lifestyle habits but mobility limited.  Reflux is stable on omeprazole, occasional breakthrough symptoms requiring second dose of omeprazole.  Anxiety stable on venlafaxine.        10/18/2023     1:29 PM   Preop Questions   1. Have you ever had a heart attack or stroke? No   2. Have you ever had surgery on your heart or blood vessels, such as a stent placement, a coronary artery bypass, or surgery on an artery in your head, neck, heart, or legs? No   3. Do you have chest pain with activity? No   4. Do you have a history of  heart failure? No   5. Do you currently have a cold, bronchitis or symptoms of other infection? No   6. Do you have a cough, shortness of breath, or wheezing? No   7. Do you or anyone in your family have previous history of blood clots? No   8. Do you or does anyone in your family have a serious bleeding problem such as prolonged bleeding following surgeries or cuts? No   9. Have  you ever had problems with anemia or been told to take iron pills? No   10. Have you had any abnormal blood loss such as black, tarry or bloody stools, or abnormal vaginal bleeding? No   11. Have you ever had a blood transfusion? No   12. Are you willing to have a blood transfusion if it is medically needed before, during, or after your surgery? Yes   13. Have you or any of your relatives ever had problems with anesthesia? No   14. Do you have sleep apnea, excessive snoring or daytime drowsiness? No   15. Do you have any artifical heart valves or other implanted medical devices like a pacemaker, defibrillator, or continuous glucose monitor? No   16. Do you have artificial joints? No   17. Are you allergic to latex? No   18. Is there any chance that you may be pregnant? No       Preoperative Review of :   reviewed - no record of controlled substances prescribed.    Review of Systems  Constitutional, neuro, ENT, endocrine, pulmonary, cardiac, gastrointestinal, genitourinary, musculoskeletal, integument and psychiatric systems are negative, except as otherwise noted.    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        Generalized Anxiety Disorder      Priority: Medium     Created by Conversion        Female Stress Incontinence      Priority:  Medium     Created by Conversion        Allergies      Priority: Medium     Created by Conversion          Past Medical History:   Diagnosis Date    Cholelithiasis     Depression     GERD (gastroesophageal reflux disease)     Hypertension      Past Surgical History:   Procedure Laterality Date     SECTION      COLONOSCOPY      one polyp removed.    [facility]  DELIVERY ONLY      Description:  Section;  Recorded: 10/16/2008;  Comments: x2    [facility] LAP,CHOLECYSTECTOMY/EXPLORE      Description: Cholecystectomy Laparoscopic;  Recorded: 04/15/2008;    [facility] REVISN TRACHEA,CERVICAL      Description: Tracheoplasty Cervical;  Recorded: 10/16/2008;  Comments: Trach inserted as a child     Current Outpatient Medications   Medication Sig Dispense Refill    cetirizine (ZYRTEC) 10 MG tablet Take 10 mg by mouth      lisinopril-hydrochlorothiazide (ZESTORETIC) 10-12.5 MG tablet Take 1 tablet by mouth daily 90 tablet 4    magnesium oxide (MAG-OX) 400 MG tablet [MAGNESIUM OXIDE (MAG-OX) 400 MG (241.3 MG MAGNESIUM) TABLET] TAKE 1 TABLET BY MOUTH EVERY DAY      magnesium oxide (MAG-OX) 400 MG tablet TAKE 1 TABLET BY MOUTH EVERY DAY 90 tablet 5    omeprazole (PRILOSEC) 20 MG DR capsule Take 1 capsule (20 mg) by mouth daily 90 capsule 3    venlafaxine (EFFEXOR XR) 75 MG 24 hr capsule Take 1 capsule (75 mg) by mouth daily 90 capsule 4       Allergies   Allergen Reactions    Penicillin G Other (See Comments)     Turned yellow    Penicillins Unknown    Sulfa (Sulfonamide Antibiotics) [Sulfa Antibiotics] Rash    Nexium [Esomeprazole] Palpitations     Felt like BP and HR increased/palitations.        Social History     Tobacco Use    Smoking status: Some Days     Packs/day: .25     Types: Cigarettes    Smokeless tobacco: Never   Substance Use Topics    Alcohol use: Yes     Alcohol/week: 0.0 - 5.0 standard drinks of alcohol     Family History   Problem Relation Age of Onset    Diabetes Mother     Obesity Mother     Dementia  "Father     Diabetes Brother     Obesity Brother      History   Drug Use No         Objective     /82   Pulse 90   Temp 97.3  F (36.3  C) (Oral)   Resp 18   Ht 1.537 m (5' 0.5\")   Wt 115.3 kg (254 lb 3.2 oz)   SpO2 95%   BMI 48.83 kg/m      Physical Exam    GENERAL APPEARANCE: healthy, alert and no distress     EYES: EOMI, PERRL     HENT: ear canals and TM's normal and nose and mouth without ulcers or lesions     NECK: no adenopathy, no asymmetry, masses, or scars and thyroid normal to palpation     RESP: lungs clear to auscultation - no rales, rhonchi or wheezes     CV: regular rates and rhythm, normal S1 S2, no S3 or S4 and no murmur, click or rub     ABDOMEN:  soft, nontender, no HSM or masses and bowel sounds normal     MS: extremities normal- no gross deformities noted, no evidence of inflammation in joints, FROM in all extremities.     SKIN: no suspicious lesions or rashes     NEURO: Normal strength and tone, sensory exam grossly normal, mentation intact and speech normal     PSYCH: mentation appears normal. and affect normal/bright     LYMPHATICS: No cervical adenopathy    Recent Labs   Lab Test 11/17/22  0928 04/22/22  1720   HGB 14.9 13.5    362   *  --    POTASSIUM 3.9  --    CR 0.84  --         Diagnostics:  Labs pending at this time.  Results will be reviewed when available.   No EKG required for low risk surgery (cataract, skin procedure, breast biopsy, etc).  No EKG required, no history of coronary heart disease, significant arrhythmia, peripheral arterial disease or other structural heart disease.    Revised Cardiac Risk Index (RCRI):  The patient has the following serious cardiovascular risks for perioperative complications:   - No serious cardiac risks = 0 points     RCRI Interpretation: 0 points: Class I (very low risk - 0.4% complication rate)         Signed Electronically by: Mansi Mccracken MD  Copy of this evaluation report is provided to requesting physician.      "

## 2023-10-23 ENCOUNTER — TRANSFERRED RECORDS (OUTPATIENT)
Dept: HEALTH INFORMATION MANAGEMENT | Facility: CLINIC | Age: 58
End: 2023-10-23
Payer: COMMERCIAL

## 2023-11-01 ENCOUNTER — PATIENT OUTREACH (OUTPATIENT)
Dept: CARE COORDINATION | Facility: CLINIC | Age: 58
End: 2023-11-01
Payer: COMMERCIAL

## 2023-11-06 ENCOUNTER — TRANSFERRED RECORDS (OUTPATIENT)
Dept: HEALTH INFORMATION MANAGEMENT | Facility: CLINIC | Age: 58
End: 2023-11-06
Payer: COMMERCIAL

## 2023-11-09 DIAGNOSIS — E66.01 MORBID OBESITY (H): Primary | ICD-10-CM

## 2023-12-04 ENCOUNTER — TRANSFERRED RECORDS (OUTPATIENT)
Dept: HEALTH INFORMATION MANAGEMENT | Facility: CLINIC | Age: 58
End: 2023-12-04
Payer: COMMERCIAL

## 2023-12-06 DIAGNOSIS — F41.1 GENERALIZED ANXIETY DISORDER: ICD-10-CM

## 2023-12-06 RX ORDER — VENLAFAXINE HYDROCHLORIDE 75 MG/1
75 CAPSULE, EXTENDED RELEASE ORAL DAILY
Qty: 90 CAPSULE | Refills: 2 | Status: SHIPPED | OUTPATIENT
Start: 2023-12-06

## 2023-12-30 ENCOUNTER — HEALTH MAINTENANCE LETTER (OUTPATIENT)
Age: 58
End: 2023-12-30

## 2024-01-23 ENCOUNTER — TRANSFERRED RECORDS (OUTPATIENT)
Dept: HEALTH INFORMATION MANAGEMENT | Facility: CLINIC | Age: 59
End: 2024-01-23
Payer: COMMERCIAL

## 2024-02-05 ENCOUNTER — E-VISIT (OUTPATIENT)
Dept: FAMILY MEDICINE | Facility: CLINIC | Age: 59
End: 2024-02-05
Payer: COMMERCIAL

## 2024-02-05 DIAGNOSIS — N39.0 ACUTE UTI (URINARY TRACT INFECTION): Primary | ICD-10-CM

## 2024-02-05 PROCEDURE — 99207 PR NON-BILLABLE SERV PER CHARTING: CPT | Performed by: FAMILY MEDICINE

## 2024-02-05 NOTE — TELEPHONE ENCOUNTER
Provider E-Visit time total (minutes): 3    Reported symptoms suggestive of UTI, but with reported midback pain, needs in person assessment to determine if pyelonephritis is possible and therefore may require more intensive treatment.

## 2024-02-05 NOTE — PATIENT INSTRUCTIONS
Dear Gia Sultana,    We are sorry you are not feeling well. Based on the responses you provided, it is recommended that you be seen in-person so we can better evaluate your symptoms. Your back pain suggests your symptoms could be due to a worse infection like a kidney infection, and we would need to treat this in a different manner than a routine urinary tract infection.  Please call the clinic at [phone] to schedule a visit-- if none is available today, please go to Urgent Care today for further evaluation.  Please click here to find the nearest urgent care location to you.   You will not be charged for this Visit. Thank you for trusting us with your care.    Mansi Mccracken MD

## 2024-02-06 ENCOUNTER — OFFICE VISIT (OUTPATIENT)
Dept: FAMILY MEDICINE | Facility: CLINIC | Age: 59
End: 2024-02-06
Payer: COMMERCIAL

## 2024-02-06 VITALS
RESPIRATION RATE: 18 BRPM | TEMPERATURE: 98.7 F | OXYGEN SATURATION: 94 % | HEART RATE: 91 BPM | DIASTOLIC BLOOD PRESSURE: 81 MMHG | SYSTOLIC BLOOD PRESSURE: 138 MMHG

## 2024-02-06 DIAGNOSIS — R30.0 DYSURIA: ICD-10-CM

## 2024-02-06 DIAGNOSIS — R39.9 UTI SYMPTOMS: Primary | ICD-10-CM

## 2024-02-06 LAB
ALBUMIN UR-MCNC: NEGATIVE MG/DL
APPEARANCE UR: CLEAR
BACTERIA #/AREA URNS HPF: ABNORMAL /HPF
BILIRUB UR QL STRIP: NEGATIVE
COLOR UR AUTO: YELLOW
GLUCOSE UR STRIP-MCNC: NEGATIVE MG/DL
HGB UR QL STRIP: ABNORMAL
KETONES UR STRIP-MCNC: NEGATIVE MG/DL
LEUKOCYTE ESTERASE UR QL STRIP: NEGATIVE
NITRATE UR QL: NEGATIVE
PH UR STRIP: 5.5 [PH] (ref 5–8)
RBC #/AREA URNS AUTO: ABNORMAL /HPF
SP GR UR STRIP: 1.02 (ref 1–1.03)
SQUAMOUS #/AREA URNS AUTO: ABNORMAL /LPF
UROBILINOGEN UR STRIP-ACNC: 0.2 E.U./DL
WBC #/AREA URNS AUTO: ABNORMAL /HPF

## 2024-02-06 PROCEDURE — 81001 URINALYSIS AUTO W/SCOPE: CPT | Performed by: PHYSICIAN ASSISTANT

## 2024-02-06 PROCEDURE — 99214 OFFICE O/P EST MOD 30 MIN: CPT | Performed by: PHYSICIAN ASSISTANT

## 2024-02-06 PROCEDURE — 87086 URINE CULTURE/COLONY COUNT: CPT | Performed by: PHYSICIAN ASSISTANT

## 2024-02-06 RX ORDER — NITROFURANTOIN 25; 75 MG/1; MG/1
100 CAPSULE ORAL 2 TIMES DAILY
Qty: 14 CAPSULE | Refills: 0 | Status: SHIPPED | OUTPATIENT
Start: 2024-02-06 | End: 2024-02-13

## 2024-02-06 NOTE — PROGRESS NOTES
Patient presents with:  Back Pain:  Has mid back pain into lower back for 5 days urine is cloudy and has odor      Clinical Decision Making:  Analysis was obtained and did not show infection or microscopic hematuria.  Patient is treated empirically for the dysuria on the right flank pain with an antibiotic for urinary tract infection.  Indication for emergent return to the emergency room if progression of symptoms or other symptoms should develop for nephrolithiasis these would include but not limited to increasing right-sided flank pain fever voiding in small amounts or not able to void or gross hematuria.  Voiced understanding's concerns and questions were answered to her satisfaction before discharge      ICD-10-CM    1. UTI symptoms  R39.9 UA Macroscopic with reflex to Microscopic and Culture - Clinic Collect     UA Microscopic with Reflex to Culture     nitroFURantoin macrocrystal-monohydrate (MACROBID) 100 MG capsule     Urine Culture Aerobic Bacterial      2. Dysuria  R30.0 nitroFURantoin macrocrystal-monohydrate (MACROBID) 100 MG capsule     Urine Culture Aerobic Bacterial          Patient Instructions   Increased fluids and rest.  Discussed signs and symptoms of ascending urinary tract infection symptoms to include pyelonephritis.  Antibiotic as written. Complete antibiotic regimen as prescribed.   You will be notified if the treatment plan needs to be changed based on the urine culture results.     Follow up with primary care provider if you do not get resolution with the course of treatment or if symptoms present as follow:  have a fever of 100.4 F (38 C) or higher, no improvement after three days of treatment, trouble urinating because of pain,new or increased discharge, rash or joint pain, Increased back or abdominal pain.    Return to walk-in care if complication or new symptoms arise in the interim.      HPI:  Gia Sultana is a 58 year old female who presents today for right sided flank pain and  irritative voiding symptoms to include urinary urgency frequency and dysuria.  Patient has not had gross hematuria fever chills or night sweats.  Has had right-sided flank pain.  No prior history of nephrolithiasis.  No fever chills or night sweats reported.  No treatment was tried for this at home    History obtained from chart review and the patient.    Problem List:  2023-10: Gastroesophageal reflux disease without esophagitis  2021-09: Morbid obesity (H)  2018-03: Hepatic cyst  2018-03: Renal cyst  2016-04: Low serum vitamin D  2014-11: Decreased hearing of right ear  2014-11: Otalgia  2014-11: Otitis media  Female Stress Incontinence  Obesity  Generalized Anxiety Disorder  Allergies  Dyslipidemia  Hypertension      Past Medical History:   Diagnosis Date    Cholelithiasis     Depression     GERD (gastroesophageal reflux disease)     Hypertension        Social History     Tobacco Use    Smoking status: Some Days     Packs/day: .25     Types: Cigarettes    Smokeless tobacco: Never   Substance Use Topics    Alcohol use: Yes     Alcohol/week: 0.0 - 5.0 standard drinks of alcohol       Review of Systems  As above in HPI otherwise negative.    Vitals:    02/06/24 1449   BP: 138/81   Pulse: 91   Resp: 18   Temp: 98.7  F (37.1  C)   TempSrc: Oral   SpO2: 94%       General: Patient is resting comfortably no acute distress is afebrile  HEENT: Head is normocephalic atraumatic   eyes are PERRL EOMI sclera anicteric   TMs are clear bilaterally  Throat is clear and no exudate  No cervical lymphadenopathy present  LUNGS: Clear to auscultation bilaterally  HEART: Regular rate and rhythm  Abdomen:  Right-sided CVA tenderness to percussion.  No suprapubic tenderness or induration to palpation  Skin: Without rash non-diaphoretic    Physical Exam      Labs:  Results for orders placed or performed in visit on 02/06/24   UA Macroscopic with reflex to Microscopic and Culture - Clinic Collect     Status: Abnormal    Specimen: Urine,  Clean Catch   Result Value Ref Range    Color Urine Yellow Colorless, Straw, Light Yellow, Yellow    Appearance Urine Clear Clear    Glucose Urine Negative Negative mg/dL    Bilirubin Urine Negative Negative    Ketones Urine Negative Negative mg/dL    Specific Gravity Urine 1.020 1.005 - 1.030    Blood Urine Trace (A) Negative    pH Urine 5.5 5.0 - 8.0    Protein Albumin Urine Negative Negative mg/dL    Urobilinogen Urine 0.2 0.2, 1.0 E.U./dL    Nitrite Urine Negative Negative    Leukocyte Esterase Urine Negative Negative   UA Microscopic with Reflex to Culture     Status: Abnormal   Result Value Ref Range    Bacteria Urine Few (A) None Seen /HPF    RBC Urine 0-2 0-2 /HPF /HPF    WBC Urine 0-5 0-5 /HPF /HPF    Squamous Epithelials Urine Few (A) None Seen /LPF    Narrative    Urine Culture not indicated     Urine is sent for culture.    At the end of the encounter, I discussed results, diagnosis, medications. Discussed red flags for immediate return to clinic/ER, as well as indications for follow up if no improvement. Patient understood and agreed to plan. Patient was stable for discharge.

## 2024-02-06 NOTE — PATIENT INSTRUCTIONS
Increased fluids and rest.  Discussed signs and symptoms of ascending urinary tract infection symptoms to include pyelonephritis.  Antibiotic as written. Complete antibiotic regimen as prescribed.   You will be notified if the treatment plan needs to be changed based on the urine culture results.     Follow up with primary care provider if you do not get resolution with the course of treatment or if symptoms present as follow:  have a fever of 100.4 F (38 C) or higher, no improvement after three days of treatment, trouble urinating because of pain,new or increased discharge, rash or joint pain, Increased back or abdominal pain.    Return to walk-in care if complication or new symptoms arise in the interim.

## 2024-02-07 DIAGNOSIS — K21.9 GASTROESOPHAGEAL REFLUX DISEASE WITHOUT ESOPHAGITIS: ICD-10-CM

## 2024-02-07 DIAGNOSIS — I10 ESSENTIAL HYPERTENSION, MALIGNANT: ICD-10-CM

## 2024-02-07 LAB — BACTERIA UR CULT: NORMAL

## 2024-02-07 RX ORDER — LISINOPRIL/HYDROCHLOROTHIAZIDE 10-12.5 MG
1 TABLET ORAL DAILY
Qty: 90 TABLET | Refills: 2 | Status: SHIPPED | OUTPATIENT
Start: 2024-02-07

## 2024-02-15 ASSESSMENT — SLEEP AND FATIGUE QUESTIONNAIRES
HOW LIKELY ARE YOU TO NOD OFF OR FALL ASLEEP WHILE SITTING AND READING: MODERATE CHANCE OF DOZING
HOW LIKELY ARE YOU TO NOD OFF OR FALL ASLEEP WHILE SITTING INACTIVE IN A PUBLIC PLACE: WOULD NEVER DOZE
HOW LIKELY ARE YOU TO NOD OFF OR FALL ASLEEP WHEN YOU ARE A PASSENGER IN A CAR FOR AN HOUR WITHOUT A BREAK: WOULD NEVER DOZE
HOW LIKELY ARE YOU TO NOD OFF OR FALL ASLEEP WHILE LYING DOWN TO REST IN THE AFTERNOON WHEN CIRCUMSTANCES PERMIT: HIGH CHANCE OF DOZING
HOW LIKELY ARE YOU TO NOD OFF OR FALL ASLEEP WHILE WATCHING TV: SLIGHT CHANCE OF DOZING
HOW LIKELY ARE YOU TO NOD OFF OR FALL ASLEEP WHILE SITTING AND TALKING TO SOMEONE: WOULD NEVER DOZE
HOW LIKELY ARE YOU TO NOD OFF OR FALL ASLEEP IN A CAR, WHILE STOPPED FOR A FEW MINUTES IN TRAFFIC: WOULD NEVER DOZE
HOW LIKELY ARE YOU TO NOD OFF OR FALL ASLEEP WHILE SITTING QUIETLY AFTER LUNCH WITHOUT ALCOHOL: SLIGHT CHANCE OF DOZING

## 2024-02-19 ENCOUNTER — OFFICE VISIT (OUTPATIENT)
Dept: FAMILY MEDICINE | Facility: CLINIC | Age: 59
End: 2024-02-19
Payer: COMMERCIAL

## 2024-02-19 VITALS
TEMPERATURE: 98.5 F | BODY MASS INDEX: 48.77 KG/M2 | WEIGHT: 258.3 LBS | SYSTOLIC BLOOD PRESSURE: 135 MMHG | DIASTOLIC BLOOD PRESSURE: 80 MMHG | HEART RATE: 101 BPM | HEIGHT: 61 IN | OXYGEN SATURATION: 95 % | RESPIRATION RATE: 16 BRPM

## 2024-02-19 DIAGNOSIS — I10 ESSENTIAL HYPERTENSION: ICD-10-CM

## 2024-02-19 DIAGNOSIS — E66.01 CLASS 3 SEVERE OBESITY DUE TO EXCESS CALORIES WITH SERIOUS COMORBIDITY AND BODY MASS INDEX (BMI) OF 45.0 TO 49.9 IN ADULT (H): Primary | ICD-10-CM

## 2024-02-19 DIAGNOSIS — Z12.11 SCREEN FOR COLON CANCER: ICD-10-CM

## 2024-02-19 DIAGNOSIS — E88.810 METABOLIC SYNDROME X: ICD-10-CM

## 2024-02-19 DIAGNOSIS — E66.813 CLASS 3 SEVERE OBESITY DUE TO EXCESS CALORIES WITH SERIOUS COMORBIDITY AND BODY MASS INDEX (BMI) OF 45.0 TO 49.9 IN ADULT (H): Primary | ICD-10-CM

## 2024-02-19 DIAGNOSIS — Z12.31 VISIT FOR SCREENING MAMMOGRAM: ICD-10-CM

## 2024-02-19 DIAGNOSIS — E78.5 DYSLIPIDEMIA: ICD-10-CM

## 2024-02-19 PROCEDURE — 99215 OFFICE O/P EST HI 40 MIN: CPT | Performed by: FAMILY MEDICINE

## 2024-02-19 RX ORDER — SEMAGLUTIDE 0.25 MG/.5ML
0.25 INJECTION, SOLUTION SUBCUTANEOUS WEEKLY
Qty: 2 ML | Refills: 0 | Status: SHIPPED | OUTPATIENT
Start: 2024-02-19 | End: 2024-03-12

## 2024-02-19 NOTE — ASSESSMENT & PLAN NOTE
58 year old year old female in clinic today to discuss treatment of the following conditions through diet and lifestyle modification and weight loss:  1. Class 3 severe obesity due to excess calories with serious comorbidity and body mass index (BMI) of 45.0 to 49.9 in adult (H)    2. Visit for screening mammogram    3. Screen for colon cancer    4. Dyslipidemia    5. Hypertension    6. Metabolic syndrome X      Intake: 58-year-old woman with strong family history of obesity and diabetes who meets criteria for metabolic syndrome presenting for discussion of medical weight loss.  She describes a sense of frustration as she is at her highest weight ever in her life.  Her nutrition strategy is not completely random.  She shops in the supermarket on a regular basis (mostly online shopping to avoid extra purchases).  She is short of time in the morning.  In the past, she has experienced a greater sense of satiety and overall nutritional improvement when starting the day with protein.  This does fit with her underlying condition of metabolic syndrome.  There is no contraindication to a GLP-1 receptor agonist.  No personal/family history of thyroid cancer.  No personal history of pancreatitis.  We discussed potential side effects associated with these medicines.  We discussed the ongoing supply issues with these medicines.  I suspect her insurance will cover Wegovy.  Unknown if it would cover Zepbound (although the formulary that populates the computer system says it is not covered).  For this reason we will start with Wegovy.  If she is able to fill the 0.25 mg/week dosing I will then immediately send in the 0.5 and 1 mg/week doses so she can start working to build her supply.  I have recommended that she return to clinic approximately 10 weeks after starting a new medicine.  We did not review physical activity/exercise.  We will focus on adding strength training if at all possible.  For now she will focus on getting a  dramatic increase in protein and vegetables.  Anticipate she will achieve an overall caloric restriction with the strategies.  She will meet with our dietitian on 2/27 to review nutrition in greater detail.

## 2024-02-19 NOTE — PROGRESS NOTES
"    New Medical Weight Management Consult    PATIENT:  Gia Sultana  MRN:         [id number]  :         1965  COOPER:         2024    Dear Dr. Mccracken,    I had the pleasure of seeing your patient, Gia Sultana. Full intake/assessment was done to determine barriers to weight loss success and develop a treatment plan. Gia Sultana is a 58 year old female interested in treatment of medical problems associated with excess weight. She has a height of 5' 1\", a weight of 258 lbs 4.8 oz, and the calculated Body mass index is 48.81 kg/m .    ASSESSMENT/PLAN:  Class 3 severe obesity due to excess calories with serious comorbidity and body mass index (BMI) of 45.0 to 49.9 in adult (H)  58 year old year old female in clinic today to discuss treatment of the following conditions through diet and lifestyle modification and weight loss:  1. Class 3 severe obesity due to excess calories with serious comorbidity and body mass index (BMI) of 45.0 to 49.9 in adult (H)    2. Visit for screening mammogram    3. Screen for colon cancer    4. Dyslipidemia    5. Hypertension    6. Metabolic syndrome X      Intake: 58-year-old woman with strong family history of obesity and diabetes who meets criteria for metabolic syndrome presenting for discussion of medical weight loss.  She describes a sense of frustration as she is at her highest weight ever in her life.  Her nutrition strategy is not completely random.  She shops in the supermarket on a regular basis (mostly online shopping to avoid extra purchases).  She is short of time in the morning.  In the past, she has experienced a greater sense of satiety and overall nutritional improvement when starting the day with protein.  This does fit with her underlying condition of metabolic syndrome.  There is no contraindication to a GLP-1 receptor agonist.  No personal/family history of thyroid cancer.  No personal history of pancreatitis.  We discussed potential side effects " "associated with these medicines.  We discussed the ongoing supply issues with these medicines.  I suspect her insurance will cover Wegovy.  Unknown if it would cover Zepbound (although the formulary that populates the computer system says it is not covered).  For this reason we will start with Wegovy.  If she is able to fill the 0.25 mg/week dosing I will then immediately send in the 0.5 and 1 mg/week doses so she can start working to build her supply.  I have recommended that she return to clinic approximately 10 weeks after starting a new medicine.  We did not review physical activity/exercise.  We will focus on adding strength training if at all possible.  For now she will focus on getting a dramatic increase in protein and vegetables.  Anticipate she will achieve an overall caloric restriction with the strategies.  She will meet with our dietitian on 2/27 to review nutrition in greater detail.    FOLLOW-UP:   ~10 weeks.    SUBJECTIVE:     She has the following co-morbidities:        2/15/2024    11:23 AM   --   I have the following health issues associated with obesity High Blood Pressure    GERD (Reflux)    Fatty Liver   I have the following symptoms associated with obesity Knee Pain    Lower Extremity Swelling    Back Pain     Narrative History:    - left knee: \"I need to get a surgery and need to lose weight.\"   - she orders groceries online.  \"I buy less.\"   - she is always racing and has to come up with a lunch.     - she notes that she has been more successful in the past when she started the days with eggs.  \"I am a breakfast.\"         2/15/2024    11:23 AM   Patient Goals   If yes, please indicate which surgery? left knee - needs replacement but was told to lower BMI         2/15/2024    11:23 AM   Referring Provider   Please name the provider who referred you to Medical Weight Management  If you do not know, please answer \"I Don't Know\" Mansi Mccracken         2/15/2024    11:23 AM   Weight History "   How concerned are you about your weight? Very Concerned   I became overweight As a Child   The following factors have contributed to my weight gain Eating Wrong Types of Food    Eating Too Much    Lack of Exercise    Genetic (Runs in the Family)    Stress   I have tried the following methods to lose weight Watching Portions or Calories    Exercise    Weight Watchers    Atkins-type Diet (Low Carb/High Protein)    Medications   My lowest weight since age 17 was 175   My highest weight since age 17 was 250   The most weight I have ever lost was (lbs) 45   I have the following family history of obesity/being overweight My mother is overweight    One or more of my siblings are overweight    Many of my relatives are overweight   How has your weight changed over the last year? Gained   How many pounds? 20 pounds gained since knee injury and surgery last March.         2/15/2024    11:23 AM   Diet Recall Review with Patient   If you do eat breakfast, what types of food do you eat? eggs, bread/muffin, fruit, coffee with creamer   If you do eat lunch, what types of food do you typically eat? leftovers from supper, sandwich and chips   If you do eat supper, what types of food do you typically eat? meat, potato or pasta, vegetable, bread with butter   If you do snack, what types of food do you typically eat? crackers or chips   How many glasses of juice do you drink in a typical day? 0   How many of glasses of milk do you drink in a typical day? 0   How many 8oz glasses of sugar containing drinks such as Bhavesh-Aid/sweet tea do you drink in a day? 0   How many cans/bottles of sugar pop/soda/tea/sports drinks do you drink in a day? 0   How many cans/bottles of diet pop/soda/tea or sports drink do you drink in a day? 4   How often do you have a drink of alcohol? 2-4 Times a Month   If you do drink, how many drinks might you have in a day? 3-4         2/15/2024    11:23 AM   Eating Habits   Generally, my meals include foods like  these bread, pasta, rice, potatoes, corn, crackers, sweet dessert, pop, or juice A Few Times a Week   Generally, my meals include foods like these fried meats, brats, burgers, french fries, pizza, cheese, chips, or ice cream Less Than Weekly   Eat fast food (like McDonalds, Burger [employer], Taco Bell) Once a Week   Eat at a buffet or sit-down restaurant Once a Week   Eat most of my meals in front of the TV or computer Almost Everyday   Often skip meals, eat at random times, have no regular eating times Once a Week   Rarely sit down for a meal but snack or graze throughout Less Than Weekly   Eat extra snacks between meals Almost Everyday   Eat most of my food at the end of the day Less Than Weekly   Eat in the middle of the night or wake up at night to eat Never   Eat extra snacks to prevent or correct low blood sugar Never   Eat to prevent acid reflux or stomach pain Never   Worry about not having enough food to eat Never   I eat when I am depressed Less Than Weekly   I eat when I am stressed Less Than Weekly   I eat when I am bored A Few Times a Week   I eat when I am anxious Once a Week   I eat when I am happy or as a reward Once a Week   I feel hungry all the time even if I just have eaten Almost Everyday   Feeling full is important to me Almost Everyday   I finish all the food on my plate even if I am already full Once a Week   I can't resist eating delicious food or walk past the good food/smell Almost Everyday   I eat/snack without noticing that I am eating Almost Everyday   I eat when I am preparing the meal Almost Everyday   I eat more than usual when I see others eating A Few Times a Week   I have trouble not eating sweets, ice cream, cookies, or chips if they are around the house Almost Everyday   I think about food all day Everyday   What foods, if any, do you crave? Chips/Crackers         2/15/2024    11:23 AM   Amount of Food   I feel out of control when eating Almost Everyday   I eat a large amount of  food, like a loaf of bread, a box of cookies, a pint/quart of ice cream, all at once Never   I eat a large amount of food even when I am not hungry Never   I eat rapidly Weekly   I eat alone because I feel embarrassed and do not want others to see how much I have eaten Never   I eat until I am uncomfortably full Weekly   I feel bad, disgusted, or guilty after I overeat Weekly         2/15/2024    11:23 AM   Activity/Exercise History   How much of a typical 12 hour day do you spend sitting? Half the Day   How much of a typical 12 hour day do you spend lying down? Less Than Half the Day   How much of a typical day do you spend walking/standing? Less Than Half the Day   How many hours (not including work) do you spend on the TV/Video Games/Computer/Tablet/Phone? 2-3 Hours   How many times a week are you active for the purpose of exercise? Never   What keeps you from being more active? Too tired   How many total minutes do you spend doing some activity for the purpose of exercising when you exercise? Less Than 15 Minutes     PAST MEDICAL HISTORY:  Past Medical History:   Diagnosis Date    Cholelithiasis     Depression     GERD (gastroesophageal reflux disease)     Hypertension          2/15/2024    11:23 AM   Work/Social History Reviewed With Patient   My employment status is Full-Time   My job is Education - reading/math support   How much of your job is spent on the computer or phone? Less Than 50%   How many hours do you spend commuting to work daily? 15 minutes   What is your marital status? /In a Relationship   If in a relationship, is your significant other overweight? Yes   If you have children, are they overweight? Yes   Who do you live with? my    Who does the food shopping? both of us         2/15/2024    11:23 AM   Mental Health History Reviewed With Patient   Have you ever been physically or sexually abused? No   How often in the past 2 weeks have you felt little interest or pleasure in doing  things? Not at all   Over the past 2 weeks how often have you felt down, depressed, or hopeless? Not at all         2/15/2024    11:23 AM   Sleep History Reviewed With Patient   How many hours do you sleep at night? 8     MEDICATIONS:   Current Outpatient Medications   Medication Sig Dispense Refill    cetirizine (ZYRTEC) 10 MG tablet Take 10 mg by mouth      lisinopril-hydrochlorothiazide (ZESTORETIC) 10-12.5 MG tablet TAKE 1 TABLET BY MOUTH EVERY DAY 90 tablet 2    magnesium oxide (MAG-OX) 400 MG tablet TAKE 1 TABLET BY MOUTH EVERY DAY 90 tablet 5    omeprazole (PRILOSEC) 20 MG DR capsule TAKE 1 CAPSULE BY MOUTH EVERY DAY 90 capsule 2    Semaglutide-Weight Management (WEGOVY) 0.25 MG/0.5ML pen Inject 0.25 mg Subcutaneous once a week for 4 doses 2 mL 0    venlafaxine (EFFEXOR XR) 75 MG 24 hr capsule TAKE 1 CAPSULE BY MOUTH EVERY DAY 90 capsule 2     ALLERGIES:   Allergies   Allergen Reactions    Penicillin G Other (See Comments)     Turned yellow    Penicillins Unknown    Sulfa (Sulfonamide Antibiotics) [Sulfa Antibiotics] Rash    Nexium [Esomeprazole] Palpitations     Felt like BP and HR increased/palitations.     PHYSICAL EXAM:  Physical Exam  Nursing note reviewed.   Constitutional:       General: She is not in acute distress.     Appearance: Normal appearance. She is not ill-appearing.   HENT:      Head: Normocephalic and atraumatic.   Eyes:      Extraocular Movements: Extraocular movements intact.      Conjunctiva/sclera: Conjunctivae normal.   Pulmonary:      Effort: Pulmonary effort is normal.   Neurological:      Mental Status: She is alert and oriented to person, place, and time.   Psychiatric:         Attention and Perception: Attention normal.         Mood and Affect: Mood normal.         Speech: Speech normal.         Thought Content: Thought content normal.       52 minutes spent on the date of the encounter doing chart review, history and exam, documentation and further activities per the note    This  note has been dictated using voice recognition software. Any grammatical or context distortions are unintentional and inherent to the software    Sincerely,    Miki Nicholas MD  Diplomate - American Board of Obesity Medicine  Diplomate - American Board of Family Medicine  [facility] - Comprehensive Weight Management

## 2024-03-05 ENCOUNTER — TRANSFERRED RECORDS (OUTPATIENT)
Dept: HEALTH INFORMATION MANAGEMENT | Facility: CLINIC | Age: 59
End: 2024-03-05
Payer: COMMERCIAL

## 2024-03-11 ENCOUNTER — MYC REFILL (OUTPATIENT)
Dept: FAMILY MEDICINE | Facility: CLINIC | Age: 59
End: 2024-03-11
Payer: COMMERCIAL

## 2024-03-11 DIAGNOSIS — E66.813 CLASS 3 SEVERE OBESITY DUE TO EXCESS CALORIES WITH SERIOUS COMORBIDITY AND BODY MASS INDEX (BMI) OF 45.0 TO 49.9 IN ADULT (H): ICD-10-CM

## 2024-03-11 DIAGNOSIS — E78.5 DYSLIPIDEMIA: ICD-10-CM

## 2024-03-11 DIAGNOSIS — I10 ESSENTIAL HYPERTENSION: ICD-10-CM

## 2024-03-11 DIAGNOSIS — E88.810 METABOLIC SYNDROME X: ICD-10-CM

## 2024-03-11 DIAGNOSIS — E66.01 CLASS 3 SEVERE OBESITY DUE TO EXCESS CALORIES WITH SERIOUS COMORBIDITY AND BODY MASS INDEX (BMI) OF 45.0 TO 49.9 IN ADULT (H): ICD-10-CM

## 2024-03-11 RX ORDER — SEMAGLUTIDE 0.25 MG/.5ML
0.25 INJECTION, SOLUTION SUBCUTANEOUS WEEKLY
Qty: 2 ML | Refills: 0 | Status: CANCELLED | OUTPATIENT
Start: 2024-03-11

## 2024-03-12 RX ORDER — SEMAGLUTIDE 0.5 MG/.5ML
0.5 INJECTION, SOLUTION SUBCUTANEOUS WEEKLY
Qty: 2 ML | Refills: 0 | Status: SHIPPED | OUTPATIENT
Start: 2024-03-12 | End: 2024-08-05

## 2024-03-12 RX ORDER — SEMAGLUTIDE 1 MG/.5ML
1 INJECTION, SOLUTION SUBCUTANEOUS WEEKLY
Qty: 2 ML | Refills: 0 | Status: SHIPPED | OUTPATIENT
Start: 2024-03-12 | End: 2024-08-05

## 2024-05-07 ENCOUNTER — MYC MEDICAL ADVICE (OUTPATIENT)
Dept: FAMILY MEDICINE | Facility: CLINIC | Age: 59
End: 2024-05-07
Payer: COMMERCIAL

## 2024-05-07 DIAGNOSIS — E66.01 CLASS 3 SEVERE OBESITY DUE TO EXCESS CALORIES WITH SERIOUS COMORBIDITY AND BODY MASS INDEX (BMI) OF 45.0 TO 49.9 IN ADULT (H): Primary | ICD-10-CM

## 2024-05-07 DIAGNOSIS — E66.813 CLASS 3 SEVERE OBESITY DUE TO EXCESS CALORIES WITH SERIOUS COMORBIDITY AND BODY MASS INDEX (BMI) OF 45.0 TO 49.9 IN ADULT (H): Primary | ICD-10-CM

## 2024-05-15 ENCOUNTER — PATIENT OUTREACH (OUTPATIENT)
Dept: CARE COORDINATION | Facility: CLINIC | Age: 59
End: 2024-05-15
Payer: COMMERCIAL

## 2024-06-14 ENCOUNTER — MYC MEDICAL ADVICE (OUTPATIENT)
Dept: FAMILY MEDICINE | Facility: CLINIC | Age: 59
End: 2024-06-14
Payer: COMMERCIAL

## 2024-06-14 DIAGNOSIS — Z12.11 SCREENING FOR COLON CANCER: Primary | ICD-10-CM

## 2024-06-18 ENCOUNTER — HOSPITAL ENCOUNTER (OUTPATIENT)
Dept: MAMMOGRAPHY | Facility: CLINIC | Age: 59
Discharge: HOME | End: 2024-06-18
Attending: FAMILY MEDICINE | Admitting: FAMILY MEDICINE
Payer: COMMERCIAL

## 2024-06-18 DIAGNOSIS — Z12.31 VISIT FOR SCREENING MAMMOGRAM: ICD-10-CM

## 2024-06-18 PROCEDURE — 77063 BREAST TOMOSYNTHESIS BI: CPT

## 2024-06-19 ENCOUNTER — MYC MEDICAL ADVICE (OUTPATIENT)
Dept: FAMILY MEDICINE | Facility: CLINIC | Age: 59
End: 2024-06-19
Payer: COMMERCIAL

## 2024-06-19 DIAGNOSIS — E66.813 CLASS 3 SEVERE OBESITY DUE TO EXCESS CALORIES WITH SERIOUS COMORBIDITY AND BODY MASS INDEX (BMI) OF 45.0 TO 49.9 IN ADULT (H): Primary | ICD-10-CM

## 2024-06-19 DIAGNOSIS — E88.810 METABOLIC SYNDROME X: ICD-10-CM

## 2024-06-19 DIAGNOSIS — I10 ESSENTIAL HYPERTENSION: ICD-10-CM

## 2024-06-19 DIAGNOSIS — E78.5 DYSLIPIDEMIA: ICD-10-CM

## 2024-06-19 DIAGNOSIS — E66.01 CLASS 3 SEVERE OBESITY DUE TO EXCESS CALORIES WITH SERIOUS COMORBIDITY AND BODY MASS INDEX (BMI) OF 45.0 TO 49.9 IN ADULT (H): Primary | ICD-10-CM

## 2024-07-30 ENCOUNTER — TRANSFERRED RECORDS (OUTPATIENT)
Dept: HEALTH INFORMATION MANAGEMENT | Facility: CLINIC | Age: 59
End: 2024-07-30
Payer: COMMERCIAL

## 2024-08-05 ENCOUNTER — OFFICE VISIT (OUTPATIENT)
Dept: FAMILY MEDICINE | Facility: CLINIC | Age: 59
End: 2024-08-05
Attending: FAMILY MEDICINE
Payer: COMMERCIAL

## 2024-08-05 VITALS
BODY MASS INDEX: 42.39 KG/M2 | HEART RATE: 83 BPM | WEIGHT: 224.5 LBS | RESPIRATION RATE: 16 BRPM | OXYGEN SATURATION: 98 % | TEMPERATURE: 97.9 F | SYSTOLIC BLOOD PRESSURE: 133 MMHG | DIASTOLIC BLOOD PRESSURE: 68 MMHG | HEIGHT: 61 IN

## 2024-08-05 DIAGNOSIS — E66.813 CLASS 3 SEVERE OBESITY DUE TO EXCESS CALORIES WITH SERIOUS COMORBIDITY AND BODY MASS INDEX (BMI) OF 40.0 TO 44.9 IN ADULT (H): Primary | ICD-10-CM

## 2024-08-05 DIAGNOSIS — E78.5 DYSLIPIDEMIA: ICD-10-CM

## 2024-08-05 DIAGNOSIS — E55.9 AVITAMINOSIS D: ICD-10-CM

## 2024-08-05 DIAGNOSIS — E88.810 METABOLIC SYNDROME X: ICD-10-CM

## 2024-08-05 DIAGNOSIS — N39.3 FEMALE STRESS INCONTINENCE: ICD-10-CM

## 2024-08-05 DIAGNOSIS — E66.01 CLASS 3 SEVERE OBESITY DUE TO EXCESS CALORIES WITH SERIOUS COMORBIDITY AND BODY MASS INDEX (BMI) OF 40.0 TO 44.9 IN ADULT (H): Primary | ICD-10-CM

## 2024-08-05 DIAGNOSIS — K21.9 GASTROESOPHAGEAL REFLUX DISEASE WITHOUT ESOPHAGITIS: ICD-10-CM

## 2024-08-05 DIAGNOSIS — F41.1 GENERALIZED ANXIETY DISORDER: ICD-10-CM

## 2024-08-05 DIAGNOSIS — L65.9 HAIR LOSS: ICD-10-CM

## 2024-08-05 DIAGNOSIS — I10 ESSENTIAL HYPERTENSION: ICD-10-CM

## 2024-08-05 LAB
ALT SERPL W P-5'-P-CCNC: 50 U/L (ref 0–50)
ANION GAP SERPL CALCULATED.3IONS-SCNC: 14 MMOL/L (ref 7–15)
BUN SERPL-MCNC: 17.3 MG/DL (ref 8–23)
CALCIUM SERPL-MCNC: 10 MG/DL (ref 8.8–10.4)
CHLORIDE SERPL-SCNC: 100 MMOL/L (ref 98–107)
CHOLEST SERPL-MCNC: 221 MG/DL
CREAT SERPL-MCNC: 0.9 MG/DL (ref 0.51–0.95)
EGFRCR SERPLBLD CKD-EPI 2021: 73 ML/MIN/1.73M2
FASTING STATUS PATIENT QL REPORTED: ABNORMAL
FASTING STATUS PATIENT QL REPORTED: NORMAL
GLUCOSE SERPL-MCNC: 87 MG/DL (ref 70–99)
HCO3 SERPL-SCNC: 25 MMOL/L (ref 22–29)
HDLC SERPL-MCNC: 52 MG/DL
LDLC SERPL CALC-MCNC: 116 MG/DL
MAGNESIUM SERPL-MCNC: 2 MG/DL (ref 1.7–2.3)
NONHDLC SERPL-MCNC: 169 MG/DL
POTASSIUM SERPL-SCNC: 4.1 MMOL/L (ref 3.4–5.3)
SODIUM SERPL-SCNC: 139 MMOL/L (ref 135–145)
TRIGL SERPL-MCNC: 267 MG/DL
TSH SERPL DL<=0.005 MIU/L-ACNC: 2.24 UIU/ML (ref 0.3–4.2)
VIT D+METAB SERPL-MCNC: 32 NG/ML (ref 20–50)

## 2024-08-05 PROCEDURE — 80048 BASIC METABOLIC PNL TOTAL CA: CPT | Performed by: FAMILY MEDICINE

## 2024-08-05 PROCEDURE — 99214 OFFICE O/P EST MOD 30 MIN: CPT | Performed by: FAMILY MEDICINE

## 2024-08-05 PROCEDURE — 82306 VITAMIN D 25 HYDROXY: CPT | Performed by: FAMILY MEDICINE

## 2024-08-05 PROCEDURE — 80061 LIPID PANEL: CPT | Performed by: FAMILY MEDICINE

## 2024-08-05 PROCEDURE — 84443 ASSAY THYROID STIM HORMONE: CPT | Performed by: FAMILY MEDICINE

## 2024-08-05 PROCEDURE — 84460 ALANINE AMINO (ALT) (SGPT): CPT | Performed by: FAMILY MEDICINE

## 2024-08-05 PROCEDURE — 36415 COLL VENOUS BLD VENIPUNCTURE: CPT | Performed by: FAMILY MEDICINE

## 2024-08-05 PROCEDURE — 83735 ASSAY OF MAGNESIUM: CPT | Performed by: FAMILY MEDICINE

## 2024-08-05 PROCEDURE — G2211 COMPLEX E/M VISIT ADD ON: HCPCS | Performed by: FAMILY MEDICINE

## 2024-08-05 RX ORDER — CYCLOBENZAPRINE HCL 5 MG
5 TABLET ORAL
COMMUNITY
Start: 2024-07-30

## 2024-08-05 NOTE — PROGRESS NOTES
Assessment & Plan   Problem List Items Addressed This Visit       Class 3 severe obesity due to excess calories with serious comorbidity and body mass index (BMI) of 40.0 to 44.9 in adult (H) - Primary     58 year old year old female in clinic today to discuss treatment of the following conditions through diet and lifestyle modification and weight loss:  1. Class 3 severe obesity due to excess calories with serious comorbidity and body mass index (BMI) of 40.0 to 44.9 in adult (H)    2. Gastroesophageal reflux disease without esophagitis    3. Hypertension    4. Metabolic syndrome X    5. Hair loss    6. Avitaminosis D    7. Dyslipidemia      The patient's weight loss result since the last visit was successful based on weight loss. In general she feels like she has made progress.  Reviewed need for increased physical activity.  She is eating adequate protein.  Overall, doing well.     BMI: Body mass index is 42.42 kg/m .  Ideal body weight: 47.8 kg (105 lb 6.1 oz)  Adjusted ideal body weight: 69.4 kg (153 lb 0.5 oz)    Current therapies:    Medications: YES semaglutide   - Starting weight for GLP1 receptor agonist: 258 lbs   - Total weight loss: 34 lbs (13.2%)    Nutritional goals/strategies: reviewed.   - caloric restriction: Yes   - time restriction: NO   - diet (macronutrient) framework: high protein/low carbohydrate     Movement:   - sedentary         Dyslipidemia     Labs today.  She had creamer.          Relevant Orders    Lipid panel reflex to direct LDL Non-fasting    Female stress incontinence    Gastroesophageal reflux disease without esophagitis     Improved.           Generalized anxiety disorder    Hypertension     BP controlled.  Check BMP.  No hypotensive symptoms.           Relevant Orders    Basic metabolic panel  (Ca, Cl, CO2, Creat, Gluc, K, Na, BUN)    Lipid panel reflex to direct LDL Non-fasting     Other Visit Diagnoses       Metabolic syndrome X        Relevant Orders    Lipid panel reflex  "to direct LDL Non-fasting    ALT    Magnesium    Hair loss        Relevant Orders    TSH with free T4 reflex    Avitaminosis D        Relevant Orders    Vitamin D Deficiency          BMI  Estimated body mass index is 42.42 kg/m  as calculated from the following:    Height as of this encounter: 1.549 m (5' 1\").    Weight as of this encounter: 101.8 kg (224 lb 8 oz).   Weight management plan: Specific weight management program called Comprehensive Weight Management discussed    The longitudinal plan of care for the diagnosis(es)/condition(s) as documented were addressed during this visit. Due to the added complexity in care, I will continue to support Gia in the subsequent management and with ongoing continuity of care.      Subjective   Gia is a 58 year old, presenting for the following health issues:  Weight Loss (Follow-up )        8/5/2024    11:20 AM   Additional Questions   Roomed by marlon     Patient presents for treatment of chronic, comorbid conditions using intensive therapeutic lifestyle interventions including nutrition, physical activity, and behavior management.   - successes: getting enough protein.  Eating more fruit.     - struggles: \"getting enough veggies.\"     - movement: limited by knee pain and pinched nerve in back.  \"Pain numbness.\"  She is on steroids right now.    - tracking/journaling: protein rich.   grams per day. She actively cultivates protein rich diet.    - following nutritional plan: yes.  Deviations from plan: rare   - hunger: Stable.    - medication benefits: helpful side effects: nausea - initially with 1.7mg/wk dose. Better with 2.4 mg.     BP:   - no lightheadedness. No dizziness.    History of Present Illness       Reason for visit:  Temitope check in    She eats 4 or more servings of fruits and vegetables daily.She consumes 0 sweetened beverage(s) daily.She exercises with enough effort to increase her heart rate 9 or less minutes per day.  She exercises with enough effort " "to increase her heart rate 3 or less days per week.   She is taking medications regularly.          Objective    /68 (BP Location: Left arm, Patient Position: Sitting, Cuff Size: Adult Large)   Pulse 83   Temp 97.9  F (36.6  C) (Oral)   Resp 16   Ht 1.549 m (5' 1\")   Wt 101.8 kg (224 lb 8 oz)   SpO2 98%   BMI 42.42 kg/m    Body mass index is 42.42 kg/m .  Physical Exam  Nursing note reviewed.   Constitutional:       General: She is not in acute distress.     Appearance: Normal appearance. She is not ill-appearing.   HENT:      Head: Normocephalic and atraumatic.   Eyes:      Extraocular Movements: Extraocular movements intact.      Conjunctiva/sclera: Conjunctivae normal.   Pulmonary:      Effort: Pulmonary effort is normal.   Neurological:      Mental Status: She is alert and oriented to person, place, and time.   Psychiatric:         Attention and Perception: Attention normal.         Mood and Affect: Mood normal.         Speech: Speech normal.         Thought Content: Thought content normal.               Signed Electronically by: Miki Nicholas MD    "

## 2024-08-05 NOTE — ASSESSMENT & PLAN NOTE
58 year old year old female in clinic today to discuss treatment of the following conditions through diet and lifestyle modification and weight loss:  1. Class 3 severe obesity due to excess calories with serious comorbidity and body mass index (BMI) of 40.0 to 44.9 in adult (H)    2. Gastroesophageal reflux disease without esophagitis    3. Hypertension    4. Metabolic syndrome X    5. Hair loss    6. Avitaminosis D    7. Dyslipidemia      The patient's weight loss result since the last visit was successful based on weight loss. In general she feels like she has made progress.  Reviewed need for increased physical activity.  She is eating adequate protein.  Overall, doing well.     BMI: Body mass index is 42.42 kg/m .  Ideal body weight: 47.8 kg (105 lb 6.1 oz)  Adjusted ideal body weight: 69.4 kg (153 lb 0.5 oz)    Current therapies:    Medications: YES semaglutide   - Starting weight for GLP1 receptor agonist: 258 lbs   - Total weight loss: 34 lbs (13.2%)    Nutritional goals/strategies: reviewed.   - caloric restriction: Yes   - time restriction: NO   - diet (macronutrient) framework: high protein/low carbohydrate     Movement:   - sedentary

## 2024-08-07 ENCOUNTER — TRANSFERRED RECORDS (OUTPATIENT)
Dept: HEALTH INFORMATION MANAGEMENT | Facility: CLINIC | Age: 59
End: 2024-08-07
Payer: COMMERCIAL

## 2024-08-29 ENCOUNTER — TRANSFERRED RECORDS (OUTPATIENT)
Dept: HEALTH INFORMATION MANAGEMENT | Facility: CLINIC | Age: 59
End: 2024-08-29
Payer: COMMERCIAL

## 2024-09-09 ENCOUNTER — TRANSFERRED RECORDS (OUTPATIENT)
Dept: HEALTH INFORMATION MANAGEMENT | Facility: CLINIC | Age: 59
End: 2024-09-09
Payer: COMMERCIAL

## 2024-10-16 ENCOUNTER — TRANSFERRED RECORDS (OUTPATIENT)
Dept: HEALTH INFORMATION MANAGEMENT | Facility: CLINIC | Age: 59
End: 2024-10-16
Payer: COMMERCIAL

## 2024-10-17 DIAGNOSIS — I10 ESSENTIAL HYPERTENSION: ICD-10-CM

## 2024-10-17 DIAGNOSIS — E66.813 CLASS 3 SEVERE OBESITY DUE TO EXCESS CALORIES WITH SERIOUS COMORBIDITY AND BODY MASS INDEX (BMI) OF 45.0 TO 49.9 IN ADULT (H): ICD-10-CM

## 2024-10-17 DIAGNOSIS — E66.01 CLASS 3 SEVERE OBESITY DUE TO EXCESS CALORIES WITH SERIOUS COMORBIDITY AND BODY MASS INDEX (BMI) OF 45.0 TO 49.9 IN ADULT (H): ICD-10-CM

## 2024-10-17 DIAGNOSIS — E78.5 DYSLIPIDEMIA: ICD-10-CM

## 2024-10-17 DIAGNOSIS — E88.810 METABOLIC SYNDROME X: ICD-10-CM

## 2024-10-18 RX ORDER — SEMAGLUTIDE 2.4 MG/.75ML
2.4 INJECTION, SOLUTION SUBCUTANEOUS WEEKLY
Qty: 0.75 ML | Refills: 3 | Status: SHIPPED | OUTPATIENT
Start: 2024-10-18

## 2024-10-22 ENCOUNTER — TRANSFERRED RECORDS (OUTPATIENT)
Dept: HEALTH INFORMATION MANAGEMENT | Facility: CLINIC | Age: 59
End: 2024-10-22
Payer: COMMERCIAL

## 2024-10-29 ENCOUNTER — TRANSFERRED RECORDS (OUTPATIENT)
Dept: HEALTH INFORMATION MANAGEMENT | Facility: CLINIC | Age: 59
End: 2024-10-29
Payer: COMMERCIAL

## 2024-11-01 DIAGNOSIS — F41.1 GENERALIZED ANXIETY DISORDER: ICD-10-CM

## 2024-11-01 RX ORDER — VENLAFAXINE HYDROCHLORIDE 75 MG/1
75 CAPSULE, EXTENDED RELEASE ORAL DAILY
Qty: 90 CAPSULE | Refills: 2 | Status: SHIPPED | OUTPATIENT
Start: 2024-11-01

## 2024-12-30 ENCOUNTER — APPOINTMENT (OUTPATIENT)
Dept: CT IMAGING | Facility: CLINIC | Age: 59
DRG: 391 | End: 2024-12-30
Attending: EMERGENCY MEDICINE
Payer: COMMERCIAL

## 2024-12-30 ENCOUNTER — NURSE TRIAGE (OUTPATIENT)
Dept: FAMILY MEDICINE | Facility: CLINIC | Age: 59
End: 2024-12-30
Payer: COMMERCIAL

## 2024-12-30 ENCOUNTER — APPOINTMENT (OUTPATIENT)
Dept: CT IMAGING | Facility: CLINIC | Age: 59
DRG: 391 | End: 2024-12-30
Attending: STUDENT IN AN ORGANIZED HEALTH CARE EDUCATION/TRAINING PROGRAM
Payer: COMMERCIAL

## 2024-12-30 ENCOUNTER — HOSPITAL ENCOUNTER (INPATIENT)
Facility: CLINIC | Age: 59
LOS: 2 days | Discharge: HOME | DRG: 391 | End: 2025-01-01
Attending: EMERGENCY MEDICINE | Admitting: HOSPITALIST
Payer: COMMERCIAL

## 2024-12-30 DIAGNOSIS — K57.20 COLONIC DIVERTICULAR ABSCESS: ICD-10-CM

## 2024-12-30 LAB
ALBUMIN SERPL BCG-MCNC: 3.7 G/DL (ref 3.5–5.2)
ALBUMIN UR-MCNC: 30 MG/DL
ALP SERPL-CCNC: 110 U/L (ref 40–150)
ALT SERPL W P-5'-P-CCNC: NORMAL U/L
ANION GAP SERPL CALCULATED.3IONS-SCNC: 14 MMOL/L (ref 7–15)
APPEARANCE UR: CLEAR
AST SERPL W P-5'-P-CCNC: NORMAL U/L
BASOPHILS # BLD AUTO: 0.1 10E3/UL (ref 0–0.2)
BASOPHILS NFR BLD AUTO: 0 %
BILIRUB DIRECT SERPL-MCNC: NORMAL MG/DL
BILIRUB SERPL-MCNC: 0.2 MG/DL
BILIRUB UR QL STRIP: NEGATIVE
BUN SERPL-MCNC: 12 MG/DL (ref 8–23)
CALCIUM SERPL-MCNC: 9.8 MG/DL (ref 8.8–10.4)
CHLORIDE SERPL-SCNC: 99 MMOL/L (ref 98–107)
COLOR UR AUTO: YELLOW
CREAT SERPL-MCNC: 0.73 MG/DL (ref 0.51–0.95)
EGFRCR SERPLBLD CKD-EPI 2021: >90 ML/MIN/1.73M2
EOSINOPHIL # BLD AUTO: 0.1 10E3/UL (ref 0–0.7)
EOSINOPHIL NFR BLD AUTO: 1 %
ERYTHROCYTE [DISTWIDTH] IN BLOOD BY AUTOMATED COUNT: 12.4 % (ref 10–15)
GLUCOSE SERPL-MCNC: 105 MG/DL (ref 70–99)
GLUCOSE UR STRIP-MCNC: NEGATIVE MG/DL
HCO3 SERPL-SCNC: 22 MMOL/L (ref 22–29)
HCT VFR BLD AUTO: 43.1 % (ref 35–47)
HGB BLD-MCNC: 14.3 G/DL (ref 11.7–15.7)
HGB UR QL STRIP: ABNORMAL
HOLD SPECIMEN: NORMAL
IMM GRANULOCYTES # BLD: 0.1 10E3/UL
IMM GRANULOCYTES NFR BLD: 1 %
KETONES UR STRIP-MCNC: 5 MG/DL
LEUKOCYTE ESTERASE UR QL STRIP: ABNORMAL
LIPASE SERPL-CCNC: 31 U/L (ref 13–60)
LYMPHOCYTES # BLD AUTO: 3.1 10E3/UL (ref 0.8–5.3)
LYMPHOCYTES NFR BLD AUTO: 22 %
MCH RBC QN AUTO: 27.3 PG (ref 26.5–33)
MCHC RBC AUTO-ENTMCNC: 33.2 G/DL (ref 31.5–36.5)
MCV RBC AUTO: 82 FL (ref 78–100)
MONOCYTES # BLD AUTO: 0.9 10E3/UL (ref 0–1.3)
MONOCYTES NFR BLD AUTO: 7 %
MUCOUS THREADS #/AREA URNS LPF: PRESENT /LPF
NEUTROPHILS # BLD AUTO: 9.5 10E3/UL (ref 1.6–8.3)
NEUTROPHILS NFR BLD AUTO: 69 %
NITRATE UR QL: NEGATIVE
NRBC # BLD AUTO: 0 10E3/UL
NRBC BLD AUTO-RTO: 0 /100
PH UR STRIP: 5.5 [PH] (ref 5–7)
PLATELET # BLD AUTO: 439 10E3/UL (ref 150–450)
POTASSIUM SERPL-SCNC: 4.3 MMOL/L (ref 3.4–5.3)
POTASSIUM SERPL-SCNC: 5.9 MMOL/L (ref 3.4–5.3)
PROT SERPL-MCNC: 8 G/DL (ref 6.4–8.3)
RBC # BLD AUTO: 5.23 10E6/UL (ref 3.8–5.2)
RBC URINE: 2 /HPF
SODIUM SERPL-SCNC: 135 MMOL/L (ref 135–145)
SP GR UR STRIP: >1.03 (ref 1–1.03)
SQUAMOUS EPITHELIAL: 5 /HPF
UROBILINOGEN UR STRIP-MCNC: 2 MG/DL
WBC # BLD AUTO: 13.7 10E3/UL (ref 4–11)
WBC URINE: 3 /HPF

## 2024-12-30 PROCEDURE — 250N000013 HC RX MED GY IP 250 OP 250 PS 637: Performed by: INTERNAL MEDICINE

## 2024-12-30 PROCEDURE — 74177 CT ABD & PELVIS W/CONTRAST: CPT

## 2024-12-30 PROCEDURE — 258N000003 HC RX IP 258 OP 636: Performed by: EMERGENCY MEDICINE

## 2024-12-30 PROCEDURE — 80048 BASIC METABOLIC PNL TOTAL CA: CPT | Performed by: EMERGENCY MEDICINE

## 2024-12-30 PROCEDURE — 250N000011 HC RX IP 250 OP 636: Performed by: STUDENT IN AN ORGANIZED HEALTH CARE EDUCATION/TRAINING PROGRAM

## 2024-12-30 PROCEDURE — 99285 EMERGENCY DEPT VISIT HI MDM: CPT | Mod: 25

## 2024-12-30 PROCEDURE — 10160 PNXR ASPIR ABSC HMTMA BULLA: CPT

## 2024-12-30 PROCEDURE — 81001 URINALYSIS AUTO W/SCOPE: CPT | Performed by: EMERGENCY MEDICINE

## 2024-12-30 PROCEDURE — 258N000003 HC RX IP 258 OP 636: Performed by: HOSPITALIST

## 2024-12-30 PROCEDURE — 83690 ASSAY OF LIPASE: CPT | Performed by: EMERGENCY MEDICINE

## 2024-12-30 PROCEDURE — 250N000011 HC RX IP 250 OP 636: Performed by: EMERGENCY MEDICINE

## 2024-12-30 PROCEDURE — 84075 ASSAY ALKALINE PHOSPHATASE: CPT | Performed by: EMERGENCY MEDICINE

## 2024-12-30 PROCEDURE — 0D9M3ZZ DRAINAGE OF DESCENDING COLON, PERCUTANEOUS APPROACH: ICD-10-PCS | Performed by: STUDENT IN AN ORGANIZED HEALTH CARE EDUCATION/TRAINING PROGRAM

## 2024-12-30 PROCEDURE — 84132 ASSAY OF SERUM POTASSIUM: CPT | Performed by: EMERGENCY MEDICINE

## 2024-12-30 PROCEDURE — 87075 CULTR BACTERIA EXCEPT BLOOD: CPT | Performed by: STUDENT IN AN ORGANIZED HEALTH CARE EDUCATION/TRAINING PROGRAM

## 2024-12-30 PROCEDURE — 85025 COMPLETE CBC W/AUTO DIFF WBC: CPT | Performed by: EMERGENCY MEDICINE

## 2024-12-30 PROCEDURE — 250N000011 HC RX IP 250 OP 636: Performed by: HOSPITALIST

## 2024-12-30 PROCEDURE — 36415 COLL VENOUS BLD VENIPUNCTURE: CPT | Performed by: EMERGENCY MEDICINE

## 2024-12-30 PROCEDURE — 87070 CULTURE OTHR SPECIMN AEROBIC: CPT | Performed by: STUDENT IN AN ORGANIZED HEALTH CARE EDUCATION/TRAINING PROGRAM

## 2024-12-30 PROCEDURE — 120N000001 HC R&B MED SURG/OB

## 2024-12-30 PROCEDURE — 250N000013 HC RX MED GY IP 250 OP 250 PS 637: Performed by: HOSPITALIST

## 2024-12-30 RX ORDER — CEFTRIAXONE 1 G/1
1 INJECTION, POWDER, FOR SOLUTION INTRAMUSCULAR; INTRAVENOUS EVERY 24 HOURS
Status: DISCONTINUED | OUTPATIENT
Start: 2024-12-31 | End: 2024-12-31

## 2024-12-30 RX ORDER — METRONIDAZOLE 500 MG/100ML
500 INJECTION, SOLUTION INTRAVENOUS ONCE
Status: COMPLETED | OUTPATIENT
Start: 2024-12-30 | End: 2024-12-30

## 2024-12-30 RX ORDER — NALOXONE HYDROCHLORIDE 0.4 MG/ML
0.2 INJECTION, SOLUTION INTRAMUSCULAR; INTRAVENOUS; SUBCUTANEOUS
Status: DISCONTINUED | OUTPATIENT
Start: 2024-12-30 | End: 2024-12-31

## 2024-12-30 RX ORDER — MINOXIDIL 2.5 MG/1
1.25 TABLET ORAL EVERY MORNING
COMMUNITY
Start: 2024-10-29

## 2024-12-30 RX ORDER — VENLAFAXINE HYDROCHLORIDE 75 MG/1
75 CAPSULE, EXTENDED RELEASE ORAL DAILY
Status: DISCONTINUED | OUTPATIENT
Start: 2024-12-31 | End: 2025-01-01 | Stop reason: HOSPADM

## 2024-12-30 RX ORDER — SODIUM CHLORIDE 9 MG/ML
INJECTION, SOLUTION INTRAVENOUS ONCE
Status: DISCONTINUED | OUTPATIENT
Start: 2024-12-30 | End: 2024-12-30

## 2024-12-30 RX ORDER — METRONIDAZOLE 500 MG/1
500 TABLET ORAL 3 TIMES DAILY
Status: DISCONTINUED | OUTPATIENT
Start: 2024-12-30 | End: 2025-01-01 | Stop reason: HOSPADM

## 2024-12-30 RX ORDER — SODIUM CHLORIDE 9 MG/ML
INJECTION, SOLUTION INTRAVENOUS CONTINUOUS
Status: DISCONTINUED | OUTPATIENT
Start: 2024-12-30 | End: 2025-01-01

## 2024-12-30 RX ORDER — IOPAMIDOL 755 MG/ML
90 INJECTION, SOLUTION INTRAVASCULAR ONCE
Status: COMPLETED | OUTPATIENT
Start: 2024-12-30 | End: 2024-12-30

## 2024-12-30 RX ORDER — HYDROMORPHONE HYDROCHLORIDE 1 MG/ML
0.5 INJECTION, SOLUTION INTRAMUSCULAR; INTRAVENOUS; SUBCUTANEOUS
Status: DISCONTINUED | OUTPATIENT
Start: 2024-12-30 | End: 2025-01-01 | Stop reason: HOSPADM

## 2024-12-30 RX ORDER — ACETAMINOPHEN 325 MG/1
325-975 TABLET ORAL EVERY 6 HOURS PRN
COMMUNITY

## 2024-12-30 RX ORDER — SODIUM CHLORIDE 9 MG/ML
INJECTION, SOLUTION INTRAVENOUS ONCE
Status: COMPLETED | OUTPATIENT
Start: 2024-12-30 | End: 2024-12-30

## 2024-12-30 RX ORDER — NALOXONE HYDROCHLORIDE 0.4 MG/ML
0.4 INJECTION, SOLUTION INTRAMUSCULAR; INTRAVENOUS; SUBCUTANEOUS
Status: DISCONTINUED | OUTPATIENT
Start: 2024-12-30 | End: 2024-12-31

## 2024-12-30 RX ORDER — LIDOCAINE 40 MG/G
CREAM TOPICAL
Status: DISCONTINUED | OUTPATIENT
Start: 2024-12-30 | End: 2025-01-01 | Stop reason: HOSPADM

## 2024-12-30 RX ORDER — FENTANYL CITRATE 50 UG/ML
25-50 INJECTION, SOLUTION INTRAMUSCULAR; INTRAVENOUS EVERY 5 MIN PRN
Status: DISCONTINUED | OUTPATIENT
Start: 2024-12-30 | End: 2024-12-31

## 2024-12-30 RX ORDER — CEFTRIAXONE 1 G/1
1 INJECTION, POWDER, FOR SOLUTION INTRAMUSCULAR; INTRAVENOUS ONCE
Status: COMPLETED | OUTPATIENT
Start: 2024-12-30 | End: 2024-12-30

## 2024-12-30 RX ORDER — ONDANSETRON 2 MG/ML
4 INJECTION INTRAMUSCULAR; INTRAVENOUS ONCE
Status: COMPLETED | OUTPATIENT
Start: 2024-12-30 | End: 2024-12-30

## 2024-12-30 RX ORDER — HYDROMORPHONE HYDROCHLORIDE 1 MG/ML
0.5 INJECTION, SOLUTION INTRAMUSCULAR; INTRAVENOUS; SUBCUTANEOUS ONCE
Status: COMPLETED | OUTPATIENT
Start: 2024-12-30 | End: 2024-12-30

## 2024-12-30 RX ORDER — SPIRONOLACTONE 50 MG/1
50 TABLET, FILM COATED ORAL EVERY MORNING
Status: ON HOLD | COMMUNITY
Start: 2024-10-29 | End: 2025-01-01

## 2024-12-30 RX ORDER — FLUMAZENIL 0.1 MG/ML
0.2 INJECTION, SOLUTION INTRAVENOUS
Status: DISCONTINUED | OUTPATIENT
Start: 2024-12-30 | End: 2024-12-31

## 2024-12-30 RX ADMIN — FENTANYL CITRATE 50 MCG: 50 INJECTION INTRAMUSCULAR; INTRAVENOUS at 15:30

## 2024-12-30 RX ADMIN — METRONIDAZOLE 500 MG: 500 INJECTION, SOLUTION INTRAVENOUS at 14:09

## 2024-12-30 RX ADMIN — MIDAZOLAM HYDROCHLORIDE 1 MG: 1 INJECTION, SOLUTION INTRAMUSCULAR; INTRAVENOUS at 15:22

## 2024-12-30 RX ADMIN — ONDANSETRON 4 MG: 2 INJECTION, SOLUTION INTRAMUSCULAR; INTRAVENOUS at 14:12

## 2024-12-30 RX ADMIN — CEFTRIAXONE 1 G: 1 INJECTION, POWDER, FOR SOLUTION INTRAMUSCULAR; INTRAVENOUS at 14:06

## 2024-12-30 RX ADMIN — FENTANYL CITRATE 50 MCG: 50 INJECTION INTRAMUSCULAR; INTRAVENOUS at 15:22

## 2024-12-30 RX ADMIN — SODIUM CHLORIDE: 9 INJECTION, SOLUTION INTRAVENOUS at 22:15

## 2024-12-30 RX ADMIN — IOPAMIDOL 90 ML: 755 INJECTION, SOLUTION INTRAVENOUS at 12:40

## 2024-12-30 RX ADMIN — SODIUM CHLORIDE: 9 INJECTION, SOLUTION INTRAVENOUS at 14:25

## 2024-12-30 RX ADMIN — Medication 3 MG: at 21:10

## 2024-12-30 RX ADMIN — METRONIDAZOLE 500 MG: 500 TABLET ORAL at 21:10

## 2024-12-30 RX ADMIN — HYDROMORPHONE HYDROCHLORIDE 0.5 MG: 1 INJECTION, SOLUTION INTRAMUSCULAR; INTRAVENOUS; SUBCUTANEOUS at 14:25

## 2024-12-30 RX ADMIN — HYDROMORPHONE HYDROCHLORIDE 0.5 MG: 1 INJECTION, SOLUTION INTRAMUSCULAR; INTRAVENOUS; SUBCUTANEOUS at 18:05

## 2024-12-30 RX ADMIN — MIDAZOLAM HYDROCHLORIDE 1 MG: 1 INJECTION, SOLUTION INTRAMUSCULAR; INTRAVENOUS at 15:30

## 2024-12-30 ASSESSMENT — ACTIVITIES OF DAILY LIVING (ADL)
ADLS_ACUITY_SCORE: 41
ADLS_ACUITY_SCORE: 41
ADLS_ACUITY_SCORE: 44
ADLS_ACUITY_SCORE: 44
ADLS_ACUITY_SCORE: 41
ADLS_ACUITY_SCORE: 41
ADLS_ACUITY_SCORE: 44
ADLS_ACUITY_SCORE: 41
ADLS_ACUITY_SCORE: 41

## 2024-12-30 ASSESSMENT — COLUMBIA-SUICIDE SEVERITY RATING SCALE - C-SSRS
1. IN THE PAST MONTH, HAVE YOU WISHED YOU WERE DEAD OR WISHED YOU COULD GO TO SLEEP AND NOT WAKE UP?: NO
6. HAVE YOU EVER DONE ANYTHING, STARTED TO DO ANYTHING, OR PREPARED TO DO ANYTHING TO END YOUR LIFE?: NO
2. HAVE YOU ACTUALLY HAD ANY THOUGHTS OF KILLING YOURSELF IN THE PAST MONTH?: NO

## 2024-12-30 NOTE — Clinical Note
Gia Kayy was seen and treated in our emergency department on 12/30/2024.  She may return to work on [unfilled].  [unfilled]     If you have any questions or concerns, please don't hesitate to call.      [unfilled]

## 2024-12-30 NOTE — MEDICATION SCRIBE - ADMISSION MEDICATION HISTORY
Medication Scribe Admission Medication History    Admission medication history is complete. The information provided in this note is only as accurate as the sources available at the time of the update.    Information Source(s): Patient and CareEverywhere/SureScripts via in-person    Pertinent Information: Patient reports not taking her daily medications for about three days due to abdominal pain/vomiting. Attempted to take 975 mg of acetaminophen yesterday AM, but vomited shortly after.     On PTA magnesium supplement. Reports often missing her daily dose and is taking only sometimes.     Changes made to PTA medication list:  Added:   Minoxidil 2.5 mg  Spironolactone 50 mg  Acetaminophen 325 mg  Deleted:   Cyclobenzaprine 5 mg  Changed: None    Allergies reviewed with patient and updates made in EHR: yes    Medication History Completed By: Alexander Mejia 12/30/2024 4:15 PM    PTA Med List   Medication Sig Note Last Dose/Taking    acetaminophen (TYLENOL) 325 MG tablet Take 325-975 mg by mouth every 6 hours as needed for mild pain (max of 4,000 mg in 24 hours).  12/29/2024 Morning    cetirizine (ZYRTEC) 10 MG tablet Take 10 mg by mouth every morning.  Past Week Morning    lisinopril-hydrochlorothiazide (ZESTORETIC) 10-12.5 MG tablet TAKE 1 TABLET BY MOUTH EVERY DAY  Past Week Morning    magnesium oxide (MAG-OX) 400 MG tablet TAKE 1 TABLET BY MOUTH EVERY DAY  Past Week Morning    minoxidil (LONITEN) 2.5 MG tablet Take 1.25 mg by mouth every morning.  Past Week Morning    omeprazole (PRILOSEC) 20 MG DR capsule TAKE 1 CAPSULE BY MOUTH EVERY DAY  Past Week Morning    Semaglutide-Weight Management (WEGOVY) 2.4 MG/0.75ML pen INJECT 2.4 MG SUBCUTANEOUS ONCE A WEEK 12/30/2024: Monday Past Month Morning    spironolactone (ALDACTONE) 50 MG tablet Take 50 mg by mouth every morning.  Past Week Morning    venlafaxine (EFFEXOR XR) 75 MG 24 hr capsule TAKE 1 CAPSULE BY MOUTH EVERY DAY  Past Week Morning

## 2024-12-30 NOTE — ED TRIAGE NOTES
The patient presents to the ED with lower abdominal pain that began last Monday. The patient reports she has not had a bowel movement in one week. She reports she is usually regular. History of diverticulitis. The patient states she began vomiting yesterday morning. She reports she is not passing gas either. No history of bowel obstruction. She reports she was just recently in [place].

## 2024-12-30 NOTE — IR NOTE
Patient Name: Gia Sultana  Medical Record Number: [id number]  Today's Date: 12/30/2024    Procedure: CT Guided Abscess aspiration or drain placement  Proceduralist: Dr Fall  Pathology present: No    Procedure Start: 1521  Procedure end: 1536  Sedation medications administered: Fentanyl 100mcg Versed 2mg     Report given to: Luz Maria FERRARI  : No    Other Notes: Pt arrived to CT room 1 from [facility]. Consent reviewed. Pt denies any questions or concerns regarding procedure. Pt positioned supine and monitored per protocol. Pt tolerated procedure without any noted complications. Pt transferred back to [facility].    32ml purulent fluid removed from left pelvic abscess. Sent to lab for culture.    Gabriella Ramos RN

## 2024-12-30 NOTE — PRE-PROCEDURE
GENERAL PRE-PROCEDURE:   Procedure:  CT guided fluid collection aspiration with moderate sedation  Date/Time:  12/30/2024 2:58 PM    Written consent obtained?: Yes    Risks and benefits: Risks, benefits and alternatives were discussed    Consent given by:  Patient  Patient states understanding of procedure being performed: Yes    Patient's understanding of procedure matches consent: Yes    Procedure consent matches procedure scheduled: Yes    Expected level of sedation:  Moderate  Appropriately NPO:  Yes  ASA Class:  1  Mallampati  :  Grade 1- soft palate, uvula, tonsillar pillars, and posterior pharyngeal wall visible  Lungs:  Lungs clear with good breath sounds bilaterally  Heart:  Normal heart sounds and rate  History & Physical reviewed:  History and physical reviewed and no updates needed  Statement of review:  I have reviewed the lab findings, diagnostic data, medications, and the plan for sedation

## 2024-12-30 NOTE — ED PROVIDER NOTES
EMERGENCY DEPARTMENT ENCOUNTER      NAME: Gia Sultana  AGE: 59 year old female  YOB: 1965  MRN: [id number]  EVALUATION DATE & TIME: No admission date for patient encounter.    PCP: Mansi Mccracken    ED PROVIDER: Homero Alford MD      Chief Complaint   Patient presents with    Constipation    Abdominal Pain         FINAL IMPRESSION:  1. Colonic diverticular abscess          ED COURSE & MEDICAL DECISION MAKIN:31 AM I met with the patient, obtained history, performed an initial exam, and discussed options and plan for diagnostics and treatment here in the ED.  2:11 PM Spoke with Dr. Paula, Hospitalist, regarding plan for admission. Patient is accepted for admission.  2:11 PM Rechecked and updated patient on imaging results. Informed her that she is accepted for admission.  2:51 PM Spoke with CORINA Wood regarding the patient. They may take the patient to perform the procedure today.    Pertinent Labs & Imaging studies reviewed. (See chart for details)  59 year old female presents to the Emergency Department for evaluation of lower abdominal pain in the feels different than previous diverticulitis        ED Course as of 24 1451   Mon Dec 30, 2024   1124 Patient called her primary care doctor and was triaged to go to the urgent care for eval of diverticulitis.  I reviewed patient's triage primary care visit note from today.  Abdominal pain x 7 days history of diverticulitis   1231 Differential includes colitis, pancreatitis, AAA, kidney stone, pyelonephritis, constipation, bowel obstruction   1232 Potassium was moderately hemolyzed we will recheck as well as lipase   1248 Potassium: 4.3  nonhemolyzed specimen   1248 Potassium(!): 5.9  Hemolyzed we will redraw   1248 Lipase: 31   1248 LFTs hemolyzed but normal total bilirubin   1346 WBC(!): 13.7   1346 Imaging shows diverticulitis severe with abscess.  Plan to admit.  IR paged   1410 IR has been paged.  Spoke with  Dr. Paula who  agreed to admit patient.   1451 Spoke with IR they may place drain versus aspirate without drain but depends on n.p.o. status which they are go to discussed with patient.       Medical Decision Making  Obtained supplemental history:Supplemental history obtained?: Documented in chart  Reviewed external records: External records reviewed?: Other: Documented in chart, if applicable  Care impacted by chronic illness:Documented in Chart  Did you consider but not order tests?: Work up considered but not performed and documented in chart, if applicable  Did you interpret images independently?: Independent interpretation of ECG and images noted in documentation, when applicable.  Consultation discussion with other provider:Did you involve another provider (consultant, , pharmacy, etc.)?: I discussed the care with another health care provider, see documentation for details.  Admit.    MIPS: Not Applicable            At the conclusion of the encounter I discussed the results of all of the tests and the disposition. The questions were answered. The patient or family acknowledged understanding and was agreeable with the care plan.         MEDICATIONS GIVEN IN THE EMERGENCY:  Medications   metroNIDAZOLE (FLAGYL) infusion 500 mg (500 mg Intravenous $New Bag 12/30/24 1409)   metroNIDAZOLE (FLAGYL) tablet 500 mg (has no administration in time range)   cefTRIAXone (ROCEPHIN) 1 g vial to attach to  mL bag for ADULTS or NS 50 mL bag for PEDS (has no administration in time range)   lidocaine 1 % 0.1-1 mL (has no administration in time range)   lidocaine (LMX4) cream (has no administration in time range)   sodium chloride (PF) 0.9% PF flush 3 mL (has no administration in time range)   sodium chloride (PF) 0.9% PF flush 3 mL (has no administration in time range)   iopamidol (ISOVUE-370) solution 90 mL (90 mLs Intravenous $Given 12/30/24 1240)   cefTRIAXone (ROCEPHIN) 1 g vial to attach to  mL bag for ADULTS or NS 50 mL bag  "for PEDS (0 g Intravenous Stopped 12/30/24 1423)   ondansetron (ZOFRAN) injection 4 mg (4 mg Intravenous $Given 12/30/24 1412)   HYDROmorphone (PF) (DILAUDID) injection 0.5 mg (0.5 mg Intravenous $Given 12/30/24 1425)   sodium chloride 0.9 % infusion ( Intravenous $New Bag 12/30/24 1425)       NEW PRESCRIPTIONS STARTED AT TODAY'S ER VISIT  New Prescriptions    No medications on file          =================================================================    TRIAGE ASSESSMENT:  The patient presents to the ED with lower abdominal pain that began last Monday. The patient reports she has not had a bowel movement in one week. She reports she is usually regular. History of diverticulitis. The patient states she began vomiting yesterday morning. She reports she is not passing gas either. No history of bowel obstruction. She reports she was just recently in [place].              HPI    Patient information was obtained from: Patient    Use of : N/A        Gia Sultana is a 59 year old female with a pertinent history of GERD, diverticular disease of large intestine and colon, colonic polyp, HTN and obesity, who presents to this ED via walk-in for evaluation of constipation and abdominal pain.    Patient reports she has a history of diverticulitis and had a colonoscopy back in October. She had a diverticulitis flare-up before in the past. She's been experiencing bilateral lower abdominal pain with more pain to her LLQ. States this doesn't feel like her typical diverticulitis because the pain is located a little higher up in her abdomen than it typically is located with her diverticulitis flare-ups. This abdominal pain started last Monday. States the abdominal pain she currently has feels \"worse\" than the pain she has with her diverticulitis. She endorses gas pains. She hasn't had a normal BM for a week now. She's been taking Dulcolax (taken this 2x) and MiraLAX (cap full portion) intermittently the past couple " of days, but didn't take any laxatives yesterday. Reports the last time she had a BM was a few days ago and notes it was just liquid stool. She hasn't been drinking much fluids lately. She is on BP medications but didn't take ir recently. She is on Zyrtec year-round and Prilosec. She denies any pain with ambulation. She Is currently on Wegovy and notes she gets constipated with taking this, not recent dosage change. She denies feeling a palpable bulge or hernia. She denies trouble voiding, hematuria, dysuria, fever, cold symptoms or other reported complaints or concerns at this time.      REVIEW OF SYSTEMS   Review of Systems - Refer to HPI, otherwise negative    PAST MEDICAL HISTORY:  Past Medical History:   Diagnosis Date    Cholelithiasis     Depression     GERD (gastroesophageal reflux disease)     Hypertension        PAST SURGICAL HISTORY:  Past Surgical History:   Procedure Laterality Date     SECTION      COLONOSCOPY      one polyp removed.    [facility]  DELIVERY ONLY      Description:  Section;  Recorded: 10/16/2008;  Comments: x2    [facility] LAP,CHOLECYSTECTOMY/EXPLORE      Description: Cholecystectomy Laparoscopic;  Recorded: 04/15/2008;    [facility] REVISN TRACHEA,CERVICAL      Description: Tracheoplasty Cervical;  Recorded: 10/16/2008;  Comments: Trach inserted as a child           CURRENT MEDICATIONS:    cetirizine (ZYRTEC) 10 MG tablet  cyclobenzaprine (FLEXERIL) 5 MG tablet  lisinopril-hydrochlorothiazide (ZESTORETIC) 10-12.5 MG tablet  magnesium oxide (MAG-OX) 400 MG tablet  omeprazole (PRILOSEC) 20 MG DR capsule  Semaglutide-Weight Management (WEGOVY) 2.4 MG/0.75ML pen  venlafaxine (EFFEXOR XR) 75 MG 24 hr capsule        ALLERGIES:  Allergies   Allergen Reactions    Penicillin G Other (See Comments)     Turned yellow    Penicillins Unknown    Sulfa (Sulfonamide Antibiotics) [Sulfa Antibiotics] Rash    Nexium [Esomeprazole] Palpitations     Felt like BP and HR increased/palitations.        FAMILY HISTORY:  Family History   Problem Relation Age of Onset    Diabetes Mother     Obesity Mother     Hypertension Mother     Cerebrovascular Disease Mother     Dementia Father     Diabetes Brother     Obesity Brother     Diabetes Maternal Grandmother     Thyroid Cancer No family hx of        SOCIAL HISTORY:   Social History     Socioeconomic History    Marital status:    Tobacco Use    Smoking status: Some Days     Current packs/day: 0.25     Types: Cigarettes    Smokeless tobacco: Never   Vaping Use    Vaping status: Never Used   Substance and Sexual Activity    Alcohol use: Yes     Alcohol/week: 0.0 - 5.0 standard drinks of alcohol    Drug use: No    Sexual activity: Yes     Partners: Male     Comment: vasectomy in    Other Topics Concern    Parent/sibling w/ CABG, MI or angioplasty before 65F 55M? No     Social Drivers of Health     Financial Resource Strain: Low Risk  (10/18/2023)    Financial Resource Strain     Within the past 12 months, have you or your family members you live with been unable to get utilities (heat, electricity) when it was really needed?: No   Food Insecurity: Low Risk  (10/18/2023)    Food Insecurity     Within the past 12 months, did you worry that your food would run out before you got money to buy more?: No     Within the past 12 months, did the food you bought just not last and you didn t have money to get more?: No   Transportation Needs: Low Risk  (10/18/2023)    Transportation Needs     Within the past 12 months, has lack of transportation kept you from medical appointments, getting your medicines, non-medical meetings or appointments, work, or from getting things that you need?: No   Interpersonal Safety: Low Risk  (8/5/2024)    Interpersonal Safety     Do you feel physically and emotionally safe where you currently live?: Yes     Within the past 12 months, have you been hit, slapped, kicked or otherwise physically hurt by someone?: No     Within the past  "12 months, have you been humiliated or emotionally abused in other ways by your partner or ex-partner?: No   Housing Stability: Low Risk  (10/18/2023)    Housing Stability     Do you have housing? : Yes     Are you worried about losing your housing?: No       VITALS:  BP (!) 146/66   Pulse 102   Temp 97.8  F (36.6  C) (Oral)   Resp 16   Ht 1.575 m (5' 2\")   Wt 96.1 kg (211 lb 14.4 oz)   SpO2 93%   BMI 38.76 kg/m      PHYSICAL EXAM      Vitals: BP (!) 146/66   Pulse 102   Temp 97.8  F (36.6  C) (Oral)   Resp 16   Ht 1.575 m (5' 2\")   Wt 96.1 kg (211 lb 14.4 oz)   SpO2 93%   BMI 38.76 kg/m    General: Appears in no acute distress, awake, alert, interactive. Seen in triage due to boarding crisis.  Eyes: Conjunctivae non-injected. Sclera anicteric.  HENT: Atraumatic.  Neck: Supple.  Respiratory/Chest: Respiration unlabored.  Abdomen: non distended. Some lower abdominal tenderness.  Musculoskeletal: Normal extremities. No edema or erythema.  Skin: Normal color. No rash or diaphoresis.  Neurologic: Face symmetric, moves all extremities spontaneously. Speech clear.  Psychiatric: Oriented to person, place, and time. Affect appropriate.    LAB:  All pertinent labs reviewed and interpreted.  Results for orders placed or performed during the hospital encounter of 12/30/24   CT Abdomen Pelvis w Contrast    Impression    IMPRESSION:   1.  Severe acute diverticulitis of the distal descending and proximal sigmoid colon with an associated 5.1 cm intramural abscess. No evidence for bowel perforation.   Basic metabolic panel   Result Value Ref Range    Sodium 135 135 - 145 mmol/L    Potassium 5.9 (H) 3.4 - 5.3 mmol/L    Chloride 99 98 - 107 mmol/L    Carbon Dioxide (CO2) 22 22 - 29 mmol/L    Anion Gap 14 7 - 15 mmol/L    Urea Nitrogen 12.0 8.0 - 23.0 mg/dL    Creatinine 0.73 0.51 - 0.95 mg/dL    GFR Estimate >90 >60 mL/min/1.73m2    Calcium 9.8 8.8 - 10.4 mg/dL    Glucose 105 (H) 70 - 99 mg/dL   Hepatic function panel "   Result Value Ref Range    Protein Total 8.0 6.4 - 8.3 g/dL    Albumin 3.7 3.5 - 5.2 g/dL    Bilirubin Total 0.2 <=1.2 mg/dL    Alkaline Phosphatase 110 40 - 150 U/L    AST      ALT      Bilirubin Direct     Result Value Ref Range    Lipase 31 13 - 60 U/L   UA with Microscopic reflex to Culture    Specimen: Urine, Clean Catch   Result Value Ref Range    Color Urine Yellow Colorless, Straw, Light Yellow, Yellow    Appearance Urine Clear Clear    Glucose Urine Negative Negative mg/dL    Bilirubin Urine Negative Negative    Ketones Urine 5 (A) Negative mg/dL    Specific Gravity Urine >1.030 (H) 1.005 - 1.030    Blood Urine 0.03 mg/dL (A) Negative    pH Urine 5.5 5.0 - 7.0    Protein Albumin Urine 30 (A) Negative mg/dL    Urobilinogen Urine 2.0 (A) <2.0 mg/dL    Nitrite Urine Negative Negative    Leukocyte Esterase Urine 75 Alexa/uL (A) Negative    Mucus Urine Present (A) None Seen /LPF    RBC Urine 2 <=2 /HPF    WBC Urine 3 <=5 /HPF    Squamous Epithelials Urine 5 (H) <=1 /HPF   CBC with platelets and differential   Result Value Ref Range    WBC Count 13.7 (H) 4.0 - 11.0 10e3/uL    RBC Count 5.23 (H) 3.80 - 5.20 10e6/uL    Hemoglobin 14.3 11.7 - 15.7 g/dL    Hematocrit 43.1 35.0 - 47.0 %    MCV 82 78 - 100 fL    MCH 27.3 26.5 - 33.0 pg    MCHC 33.2 31.5 - 36.5 g/dL    RDW 12.4 10.0 - 15.0 %    Platelet Count 439 150 - 450 10e3/uL    % Neutrophils 69 %    % Lymphocytes 22 %    % Monocytes 7 %    % Eosinophils 1 %    % Basophils 0 %    % Immature Granulocytes 1 %    NRBCs per 100 WBC 0 <1 /100    Absolute Neutrophils 9.5 (H) 1.6 - 8.3 10e3/uL    Absolute Lymphocytes 3.1 0.8 - 5.3 10e3/uL    Absolute Monocytes 0.9 0.0 - 1.3 10e3/uL    Absolute Eosinophils 0.1 0.0 - 0.7 10e3/uL    Absolute Basophils 0.1 0.0 - 0.2 10e3/uL    Absolute Immature Granulocytes 0.1 <=0.4 10e3/uL    Absolute NRBCs 0.0 10e3/uL   Extra Green Top (Lithium Heparin) Tube   Result Value Ref Range    Hold Specimen JIC    Result Value Ref Range     Potassium 4.3 3.4 - 5.3 mmol/L       RADIOLOGY:  Reviewed all pertinent imaging. Please see official radiology report.  CT Abdomen Pelvis w Contrast   Final Result   IMPRESSION:    1.  Severe acute diverticulitis of the distal descending and proximal sigmoid colon with an associated 5.1 cm intramural abscess. No evidence for bowel perforation.      CT Abdomen Peritoneum Abscess Aspiration    (Results Pending)       EKG:    N/A    PROCEDURES:   N/A        I, Ann Bryant, am serving as a scribe to document services personally performed by Homero Alford MD based on my observation and the provider's statements to me. I, Homero Alford MD, attest that Ann Bryant is acting in a scribe capacity, has observed my performance of the services and has documented them in accordance with my direction.    Homero Alford MD  [facility] EMERGENCY ROOM  [address] [address]  [phone]      Homero Alford MD  12/30/24 1659

## 2024-12-30 NOTE — TELEPHONE ENCOUNTER
X7 days, patient has a hx of diverticulitis. Patient stated that the pain comes in waves. Constipated - last BM was Saturday morning but very small, reports no good BM for one week. Vomited once yesterday morning and today from brushing her teeth.     No appointments available today, she stated she is going into [facility] at 9:30 AM    Reason for Disposition   MODERATE pain (e.g., interferes with normal activities that comes and goes (cramps) lasts > 24 hours  (Exception: Pain with Vomiting or Diarrhea - see that Protocol.)    Additional Information   Negative: Followed an abdomen (stomach) injury   Negative: Chest pain   Negative: Vomiting red blood or black (coffee ground) material   Negative: Blood in bowel movements  (Exception: Blood on surface of BM with constipation.)   Negative: Black or tarry bowel movements  (Exception: Chronic-unchanged black-grey BMs AND is taking iron pills or Pepto-Bismol.)   Negative: MILD TO MODERATE constant pain lasting > 2 hours   Negative: MILD TO MODERATE constant pain lasting > 2 hours, and age > 60 years   Negative: Vomiting bile (green color)   Negative: Patient sounds very sick or weak to the triager   Negative: Blood in urine (red, pink, or tea-colored)   Negative: Fever > 103 F (39.4 C)   Negative: Fever > 101 F (38.3 C) and over 60 years of age   Negative: Fever > 100 F (37.8 C) and has diabetes mellitus or a weak immune system (e.g., HIV positive, cancer chemotherapy, organ transplant, splenectomy, chronic steroids)   Negative: Fever > 100 F (37.8 C) and bedridden (e.g., CVA, chronic illness, recovering from surgery)   Negative: SEVERE abdominal pain (e.g., excruciating)    Protocols used: Abdominal Pain - Female-A-[place]

## 2024-12-30 NOTE — PROCEDURES
[facility]    Procedure: CT guided abscess aspiration with moderate sedation    Date/Time: 12/30/2024 3:41 PM    Performed by: Anthony Fall MD  Authorized by: Anthony Fall MD  IR Fellow Physician:    Pre Procedure Diagnosis: Diverticulitis with intramural abscess  Post Procedure Diagnosis: Same    UNIVERSAL PROTOCOL   Site Marked: Yes  Prior Images Obtained and Reviewed:  Yes  Required items: Required blood products, implants, devices and special equipment available    Patient identity confirmed:  Verbally with patient, arm band and provided demographic data  Patient was reevaluated immediately before administering moderate or deep sedation or anesthesia  Confirmation Checklist:  Patient's identity using two indicators, relevant allergies and procedure was appropriate and matched the consent or emergent situation  Time out: Immediately prior to the procedure a time out was called    Universal Protocol: the Joint Commission Universal Protocol was followed    Preparation: Patient was prepped and draped in usual sterile fashion       ANESTHESIA    Anesthesia:  Local infiltration  Local Anesthetic:  Lidocaine 1% without epinephrine      SEDATION  Patient Sedated: Yes    Sedation Type:  Moderate (conscious) sedation  Sedation:  Fentanyl, midazolam and see MAR for details  Vital signs: Vital signs monitored during sedation    Findings: Successful aspiration of 32 mL of green/brown pus from intramural descending colonic abscess, sent for GS/cultures.     Specimens: fluid and/or tissue for gram stain and culture    Procedural Complications: None    Condition: Stable    Plan: Bedrest for 1 hour.      PROCEDURE    Length of time physician/provider present for 1:1 monitoring during sedation:  0-22 min

## 2024-12-30 NOTE — H&P
"[facility]    History and Physical - Hospitalist Service       Date of Admission:  (Not on file)    Assessment & Plan      Gia Sultana is a 59 year old female presenting with abdominal pain.  She is clinically stable with a mild tachycardia.  Evaluation notable for complicated diverticulitis.  Given size of abscess, IR consulted for drainage.  Continue empiric abx.    # Diverticulitis complicated by abscess  - empiric abx  - bowel rest  - IR consult    # HTN    # Anxiety            Diet: NPO per Anesthesia Guidelines for Procedure/Surgery Except for: Meds      Curiel Catheter: Not present  Lines: None     Cardiac Monitoring: None  Code Status: Full Code    Clinically Significant Risk Factors Present on Admission        # Hyperkalemia: Highest K = 5.9 mmol/L in last 2 days, will monitor as appropriate            # Hypertension: Noted on problem list           # Obesity: Estimated body mass index is 38.76 kg/m  as calculated from the following:    Height as of this encounter: 1.575 m (5' 2\").    Weight as of this encounter: 96.1 kg (211 lb 14.4 oz).              Disposition Plan     Medically Ready for Discharge: Anticipated in 2-4 Days           Lino Paula MD  Hospitalist Service  [facility]  Securely message with [employer] (more info)  Text page via [facility] Paging/Directory     ______________________________________________________________________    Chief Complaint   Abdominal pain    History is obtained from the patient    History of Present Illness   Gia Sultana is a 59 year old female who presents with a chief complaint of abdominal pain.  One week ago, she developed constipation, abdominal pain, and bloating.  She has had only one bowel movement in the last week.  She developed vomiting yesterday, which continued into today.  She denies fevers, chest pain, chest pressure, or dyspnea.  She reports a history of diverticulitis.      Past Medical History  "   Past Medical History:   Diagnosis Date    Cholelithiasis     Depression     GERD (gastroesophageal reflux disease)     Hypertension        Past Surgical History   Past Surgical History:   Procedure Laterality Date     SECTION      COLONOSCOPY      one polyp removed.    [facility]  DELIVERY ONLY      Description:  Section;  Recorded: 10/16/2008;  Comments: x2    [facility] LAP,CHOLECYSTECTOMY/EXPLORE      Description: Cholecystectomy Laparoscopic;  Recorded: 04/15/2008;    [facility] REVISN TRACHEA,CERVICAL      Description: Tracheoplasty Cervical;  Recorded: 10/16/2008;  Comments: Trach inserted as a child       Prior to Admission Medications   Prior to Admission Medications   Prescriptions Last Dose Informant Patient Reported? Taking?   Semaglutide-Weight Management (WEGOVY) 2.4 MG/0.75ML pen   No No   Sig: INJECT 2.4 MG SUBCUTANEOUS ONCE A WEEK   cetirizine (ZYRTEC) 10 MG tablet   Yes No   Sig: Take 10 mg by mouth   cyclobenzaprine (FLEXERIL) 5 MG tablet   Yes No   Sig: Take 5 mg by mouth nightly as needed   lisinopril-hydrochlorothiazide (ZESTORETIC) 10-12.5 MG tablet   No No   Sig: TAKE 1 TABLET BY MOUTH EVERY DAY   magnesium oxide (MAG-OX) 400 MG tablet   No No   Sig: TAKE 1 TABLET BY MOUTH EVERY DAY   omeprazole (PRILOSEC) 20 MG DR capsule   No No   Sig: TAKE 1 CAPSULE BY MOUTH EVERY DAY   venlafaxine (EFFEXOR XR) 75 MG 24 hr capsule   No No   Sig: TAKE 1 CAPSULE BY MOUTH EVERY DAY      Facility-Administered Medications: None           Physical Exam   Vital Signs: Temp: 97.8  F (36.6  C) Temp src: Oral BP: (!) 143/74 Pulse: 100   Resp: 16 SpO2: 94 %      Weight: 211 lbs 14.4 oz    Gen:  lying in bed in no extremis  Neuro:  alert, conversant  CV:  tachycardia, regular rhythm  Pulm:  no acute resp distress, ctab anteriorly  GI:  abdomen soft, diffuse TTP with milder tenderness in the RLQ    Medical Decision Making             Data

## 2024-12-30 NOTE — CONSULTS
Imaging reviewed - findings of diverticulitis with small intramural abscess spanning an approximately 5 cm segment of colon.     This would be amenable to image guided aspiration to obtain microbial/culture data, though would not recommend drain placement at this time.     Anthony Fall MD

## 2024-12-30 NOTE — LETTER
[address] [address]  Phone: [phone]  Fax: [phone]    January 1, 2025        Gia Sultana  [address] [place] [address]          To whom it may concern:    RE: Gia Leongandrade    Ms. Sultana was admitted to the hospital from 12/30/2024 to 1/1/2025.  She is to be excused from work during that time and through 1/7/2025 or unless otherwise stated by her outpatient care team.    Please contact me for questions or concerns.      Sincerely,      Lino Paula MD  Staff Physician

## 2024-12-31 LAB
ANION GAP SERPL CALCULATED.3IONS-SCNC: 12 MMOL/L (ref 7–15)
BUN SERPL-MCNC: 11.4 MG/DL (ref 8–23)
CALCIUM SERPL-MCNC: 9.4 MG/DL (ref 8.8–10.4)
CHLORIDE SERPL-SCNC: 100 MMOL/L (ref 98–107)
CREAT SERPL-MCNC: 0.72 MG/DL (ref 0.51–0.95)
EGFRCR SERPLBLD CKD-EPI 2021: >90 ML/MIN/1.73M2
ERYTHROCYTE [DISTWIDTH] IN BLOOD BY AUTOMATED COUNT: 12.5 % (ref 10–15)
FLUAV RNA SPEC QL NAA+PROBE: NEGATIVE
FLUBV RNA RESP QL NAA+PROBE: NEGATIVE
GLUCOSE SERPL-MCNC: 89 MG/DL (ref 70–99)
HCO3 SERPL-SCNC: 25 MMOL/L (ref 22–29)
HCT VFR BLD AUTO: 36.1 % (ref 35–47)
HGB BLD-MCNC: 11.9 G/DL (ref 11.7–15.7)
MCH RBC QN AUTO: 27.5 PG (ref 26.5–33)
MCHC RBC AUTO-ENTMCNC: 33 G/DL (ref 31.5–36.5)
MCV RBC AUTO: 83 FL (ref 78–100)
PLATELET # BLD AUTO: 352 10E3/UL (ref 150–450)
POTASSIUM SERPL-SCNC: 3.7 MMOL/L (ref 3.4–5.3)
RBC # BLD AUTO: 4.33 10E6/UL (ref 3.8–5.2)
RSV RNA SPEC NAA+PROBE: NEGATIVE
SARS-COV-2 RNA RESP QL NAA+PROBE: NEGATIVE
SODIUM SERPL-SCNC: 137 MMOL/L (ref 135–145)
WBC # BLD AUTO: 9.3 10E3/UL (ref 4–11)

## 2024-12-31 PROCEDURE — 36415 COLL VENOUS BLD VENIPUNCTURE: CPT | Performed by: HOSPITALIST

## 2024-12-31 PROCEDURE — 80048 BASIC METABOLIC PNL TOTAL CA: CPT | Performed by: HOSPITALIST

## 2024-12-31 PROCEDURE — 250N000011 HC RX IP 250 OP 636: Performed by: HOSPITALIST

## 2024-12-31 PROCEDURE — 85048 AUTOMATED LEUKOCYTE COUNT: CPT | Performed by: HOSPITALIST

## 2024-12-31 PROCEDURE — 120N000001 HC R&B MED SURG/OB

## 2024-12-31 PROCEDURE — 85027 COMPLETE CBC AUTOMATED: CPT | Performed by: HOSPITALIST

## 2024-12-31 PROCEDURE — 87637 SARSCOV2&INF A&B&RSV AMP PRB: CPT | Performed by: INTERNAL MEDICINE

## 2024-12-31 PROCEDURE — 85014 HEMATOCRIT: CPT | Performed by: HOSPITALIST

## 2024-12-31 PROCEDURE — 258N000003 HC RX IP 258 OP 636: Performed by: HOSPITALIST

## 2024-12-31 PROCEDURE — 250N000013 HC RX MED GY IP 250 OP 250 PS 637: Performed by: HOSPITALIST

## 2024-12-31 RX ORDER — HEPARIN SODIUM 5000 [USP'U]/.5ML
5000 INJECTION, SOLUTION INTRAVENOUS; SUBCUTANEOUS 2 TIMES DAILY
Status: DISCONTINUED | OUTPATIENT
Start: 2024-12-31 | End: 2025-01-01 | Stop reason: HOSPADM

## 2024-12-31 RX ORDER — ACETAMINOPHEN 325 MG/1
650 TABLET ORAL EVERY 6 HOURS PRN
Status: DISCONTINUED | OUTPATIENT
Start: 2024-12-31 | End: 2025-01-01 | Stop reason: HOSPADM

## 2024-12-31 RX ORDER — PANTOPRAZOLE SODIUM 40 MG/1
40 TABLET, DELAYED RELEASE ORAL DAILY
Status: DISCONTINUED | OUTPATIENT
Start: 2025-01-01 | End: 2025-01-01 | Stop reason: HOSPADM

## 2024-12-31 RX ORDER — NALOXONE HYDROCHLORIDE 0.4 MG/ML
0.4 INJECTION, SOLUTION INTRAMUSCULAR; INTRAVENOUS; SUBCUTANEOUS
Status: DISCONTINUED | OUTPATIENT
Start: 2024-12-31 | End: 2025-01-01 | Stop reason: HOSPADM

## 2024-12-31 RX ORDER — ACETAMINOPHEN 325 MG/1
975 TABLET ORAL ONCE
Status: COMPLETED | OUTPATIENT
Start: 2024-12-31 | End: 2024-12-31

## 2024-12-31 RX ORDER — CETIRIZINE HYDROCHLORIDE 10 MG/1
10 TABLET ORAL EVERY MORNING
Status: DISCONTINUED | OUTPATIENT
Start: 2024-12-31 | End: 2025-01-01 | Stop reason: HOSPADM

## 2024-12-31 RX ORDER — NALOXONE HYDROCHLORIDE 0.4 MG/ML
0.2 INJECTION, SOLUTION INTRAMUSCULAR; INTRAVENOUS; SUBCUTANEOUS
Status: DISCONTINUED | OUTPATIENT
Start: 2024-12-31 | End: 2025-01-01 | Stop reason: HOSPADM

## 2024-12-31 RX ORDER — CEFTRIAXONE 2 G/1
2 INJECTION, POWDER, FOR SOLUTION INTRAMUSCULAR; INTRAVENOUS EVERY 24 HOURS
Status: DISCONTINUED | OUTPATIENT
Start: 2024-12-31 | End: 2025-01-01

## 2024-12-31 RX ORDER — ONDANSETRON 4 MG/1
4 TABLET, ORALLY DISINTEGRATING ORAL EVERY 6 HOURS PRN
Status: DISCONTINUED | OUTPATIENT
Start: 2024-12-31 | End: 2025-01-01 | Stop reason: HOSPADM

## 2024-12-31 RX ORDER — OXYCODONE HYDROCHLORIDE 5 MG/1
5 TABLET ORAL EVERY 6 HOURS PRN
Status: DISCONTINUED | OUTPATIENT
Start: 2024-12-31 | End: 2025-01-01 | Stop reason: HOSPADM

## 2024-12-31 RX ADMIN — MINOXIDIL 1.25 MG: 2.5 TABLET ORAL at 08:45

## 2024-12-31 RX ADMIN — HYDROMORPHONE HYDROCHLORIDE 0.5 MG: 1 INJECTION, SOLUTION INTRAMUSCULAR; INTRAVENOUS; SUBCUTANEOUS at 01:58

## 2024-12-31 RX ADMIN — METRONIDAZOLE 500 MG: 500 TABLET ORAL at 21:14

## 2024-12-31 RX ADMIN — ACETAMINOPHEN 975 MG: 325 TABLET ORAL at 08:39

## 2024-12-31 RX ADMIN — CETIRIZINE HYDROCHLORIDE 10 MG: 10 TABLET, FILM COATED ORAL at 12:13

## 2024-12-31 RX ADMIN — CEFTRIAXONE SODIUM 2 G: 2 INJECTION, POWDER, FOR SOLUTION INTRAMUSCULAR; INTRAVENOUS at 14:29

## 2024-12-31 RX ADMIN — SODIUM CHLORIDE: 9 INJECTION, SOLUTION INTRAVENOUS at 23:57

## 2024-12-31 RX ADMIN — METRONIDAZOLE 500 MG: 500 TABLET ORAL at 14:30

## 2024-12-31 RX ADMIN — METRONIDAZOLE 500 MG: 500 TABLET ORAL at 08:39

## 2024-12-31 RX ADMIN — VENLAFAXINE HYDROCHLORIDE 75 MG: 75 CAPSULE, EXTENDED RELEASE ORAL at 08:39

## 2024-12-31 RX ADMIN — SODIUM CHLORIDE: 9 INJECTION, SOLUTION INTRAVENOUS at 06:39

## 2024-12-31 RX ADMIN — HEPARIN SODIUM 5000 UNITS: 5000 INJECTION, SOLUTION INTRAVENOUS; SUBCUTANEOUS at 08:39

## 2024-12-31 RX ADMIN — OXYCODONE 5 MG: 5 TABLET ORAL at 14:30

## 2024-12-31 RX ADMIN — HEPARIN SODIUM 5000 UNITS: 5000 INJECTION, SOLUTION INTRAVENOUS; SUBCUTANEOUS at 20:01

## 2024-12-31 RX ADMIN — ONDANSETRON 4 MG: 4 TABLET, ORALLY DISINTEGRATING ORAL at 20:01

## 2024-12-31 RX ADMIN — OMEPRAZOLE 20 MG: 20 CAPSULE, DELAYED RELEASE ORAL at 09:27

## 2024-12-31 ASSESSMENT — ACTIVITIES OF DAILY LIVING (ADL)
ADLS_ACUITY_SCORE: 45
ADLS_ACUITY_SCORE: 22
ADLS_ACUITY_SCORE: 45
ADLS_ACUITY_SCORE: 22
ADLS_ACUITY_SCORE: 22
ADLS_ACUITY_SCORE: 45
ADLS_ACUITY_SCORE: 22
ADLS_ACUITY_SCORE: 45
ADLS_ACUITY_SCORE: 22
ADLS_ACUITY_SCORE: 26
ADLS_ACUITY_SCORE: 22

## 2024-12-31 NOTE — PROGRESS NOTES
"[facility]    Medicine Progress Note - Hospitalist Service    Date of Admission:  12/30/2024    Assessment & Plan      Gia Sultana is a 59 year old female admitted with diverticulitis complicated by abscess.  She is s/p IR aspiration of the abscess.  She is clinically stable, afebrile.  Continue empiric abx.  Advance diet to clears.    # Diverticulitis complicated by abscess  - empiric abx  - clear liquids    # HTN    # Anxiety            Diet: NPO per Anesthesia Guidelines for Procedure/Surgery Except for: Meds, Ice Chips      Curiel Catheter: Not present  Lines: None     Cardiac Monitoring: None  Code Status: Full Code      Clinically Significant Risk Factors Present on Admission        # Hyperkalemia: Highest K = 5.9 mmol/L in last 2 days, will monitor as appropriate            # Hypertension: Noted on problem list           # Obesity: Estimated body mass index is 39.12 kg/m  as calculated from the following:    Height as of this encounter: 1.575 m (5' 2\").    Weight as of this encounter: 97 kg (213 lb 14.4 oz).              Social Drivers of Health    Tobacco Use: High Risk (8/5/2024)    Patient History     Smoking Tobacco Use: Some Days     Smokeless Tobacco Use: Never          Disposition Plan     Medically Ready for Discharge: Anticipated Tomorrow             Lino Paula MD  Hospitalist Service  [facility]  Securely message with [employer] (more info)  Text page via [employer] Paging/Directory   ______________________________________________________________________    Interval History   Reports pain is improved.  Endorses nausea.  Reports having a headache.  Denies gas or a bowel movement since yesterday.    Physical Exam   Vital Signs: Temp: 98.3  F (36.8  C) Temp src: Oral BP: 131/75 Pulse: 88   Resp: 18 SpO2: 94 % O2 Device: None (Room air) Oxygen Delivery: 2 LPM  Weight: 213 lbs 14.4 oz    Gen:  lying in bed in no acute distress  Neuro:  alert, " conversant  CV:  nl rate, regular rhythm  Pulm:  no acute resp distress, ctab anteriorly  GI:  lower abdominal TTP    Medical Decision Making             Data   Reviewed:    Na 137  K 3.7  BUN 11  Cr 0.72    WBC 9.3  Hgb 12  Plts 352

## 2024-12-31 NOTE — PLAN OF CARE
Problem: Adult Inpatient Plan of Care  Goal: Optimal Comfort and Wellbeing  Outcome: Progressing  Intervention: Monitor Pain and Promote Comfort  Recent Flowsheet Documentation  Taken 12/31/2024 8879 by Marietta Martin RN  Pain Management Interventions: medication (see MAR)   Goal Outcome Evaluation:    Started on clear diet, patient reports tolerating well. No reports of n/v.   PIV running NS @125  IV abx administered.   No BM today, bowels audible.   Ambulating in halls frequently today.   VSS on RA.

## 2024-12-31 NOTE — PLAN OF CARE
Problem: Adult Inpatient Plan of Care  Goal: Readiness for Transition of Care  Outcome: Progressing  Intervention: Mutually Develop Transition Plan  Recent Flowsheet Documentation  Taken 12/31/2024 0137 by Callie Mayfield, RN  Equipment Currently Used at Home: none   Goal Outcome Evaluation:       Pt is alert and oriented x4. Ambulates with handheld assist. Maintained on NPO except ice chips. No drainage noted on punctured site of left abdomen. VSS. On RA. No BM shift. Pain is well controlled by IV Dilaudid.       HLM Admission: 6- Walk 10 steps or more  HLM Daily7-Walk 25 feet or more

## 2025-01-01 VITALS
HEART RATE: 81 BPM | OXYGEN SATURATION: 94 % | DIASTOLIC BLOOD PRESSURE: 78 MMHG | SYSTOLIC BLOOD PRESSURE: 131 MMHG | TEMPERATURE: 98 F | HEIGHT: 62 IN | WEIGHT: 217.5 LBS | RESPIRATION RATE: 18 BRPM | BODY MASS INDEX: 40.03 KG/M2

## 2025-01-01 PROCEDURE — 250N000011 HC RX IP 250 OP 636: Performed by: HOSPITALIST

## 2025-01-01 PROCEDURE — 250N000013 HC RX MED GY IP 250 OP 250 PS 637: Performed by: HOSPITALIST

## 2025-01-01 PROCEDURE — 250N000013 HC RX MED GY IP 250 OP 250 PS 637: Performed by: INTERNAL MEDICINE

## 2025-01-01 RX ORDER — CIPROFLOXACIN 500 MG/1
500 TABLET, FILM COATED ORAL 2 TIMES DAILY
Status: DISCONTINUED | OUTPATIENT
Start: 2025-01-01 | End: 2025-01-01 | Stop reason: HOSPADM

## 2025-01-01 RX ORDER — METRONIDAZOLE 500 MG/1
500 TABLET ORAL 3 TIMES DAILY
Qty: 30 TABLET | Refills: 0 | Status: SHIPPED | OUTPATIENT
Start: 2025-01-01 | End: 2025-01-11

## 2025-01-01 RX ORDER — CIPROFLOXACIN 500 MG/1
500 TABLET, FILM COATED ORAL 2 TIMES DAILY
Qty: 20 TABLET | Refills: 0 | Status: SHIPPED | OUTPATIENT
Start: 2025-01-01 | End: 2025-01-11

## 2025-01-01 RX ADMIN — PANTOPRAZOLE SODIUM 40 MG: 40 TABLET, DELAYED RELEASE ORAL at 07:51

## 2025-01-01 RX ADMIN — ACETAMINOPHEN 650 MG: 325 TABLET ORAL at 00:36

## 2025-01-01 RX ADMIN — VENLAFAXINE HYDROCHLORIDE 75 MG: 75 CAPSULE, EXTENDED RELEASE ORAL at 07:51

## 2025-01-01 RX ADMIN — Medication 3 MG: at 00:36

## 2025-01-01 RX ADMIN — CETIRIZINE HYDROCHLORIDE 10 MG: 10 TABLET, FILM COATED ORAL at 07:51

## 2025-01-01 RX ADMIN — HEPARIN SODIUM 5000 UNITS: 5000 INJECTION, SOLUTION INTRAVENOUS; SUBCUTANEOUS at 07:51

## 2025-01-01 RX ADMIN — CIPROFLOXACIN HYDROCHLORIDE 500 MG: 500 TABLET, FILM COATED ORAL at 07:51

## 2025-01-01 RX ADMIN — METRONIDAZOLE 500 MG: 500 TABLET ORAL at 07:51

## 2025-01-01 RX ADMIN — MINOXIDIL 1.25 MG: 2.5 TABLET ORAL at 08:09

## 2025-01-01 ASSESSMENT — ACTIVITIES OF DAILY LIVING (ADL)
ADLS_ACUITY_SCORE: 25
ADLS_ACUITY_SCORE: 26
ADLS_ACUITY_SCORE: 25
ADLS_ACUITY_SCORE: 25
ADLS_ACUITY_SCORE: 26
ADLS_ACUITY_SCORE: 25
ADLS_ACUITY_SCORE: 25
ADLS_ACUITY_SCORE: 26

## 2025-01-01 NOTE — PLAN OF CARE
Problem: Adult Inpatient Plan of Care  Goal: Optimal Comfort and Wellbeing  Outcome: Progressing  Intervention: Monitor Pain and Promote Comfort  Recent Flowsheet Documentation  Taken 1/1/2025 0036 by Concetta Huggins, RN  Pain Management Interventions: medication (see MAR)   Problem: Pain Acute  Goal: Optimal Pain Control and Function  Outcome: Progressing  Intervention: Develop Pain Management Plan  Recent Flowsheet Documentation  Taken 1/1/2025 0036 by Concetta Huggins, RN  Pain Management Interventions: medication (see MAR)         Goal Outcome Evaluation:       Patient alert and oriented x4. Room air. Reported a headache 8/10. PRN tylenol administered.    Voiding.   PIV patent. NS running at 125 ml/gr  Independent. Steady  VSS. Afebrile.     Makes needs known. Call light within reach. Bed at lowest position.     HLM Admission: 8- Walk 250 feet or more  HLM Daily8- Walk 250 feet or more

## 2025-01-01 NOTE — PROGRESS NOTES
"[facility]    Medicine Progress Note - Hospitalist Service    Date of Admission:  12/30/2024    Assessment & Plan   Gia Sultana is a 59 year old female admitted with diverticulitis complicated by abscess.  She is s/p IR aspiration of the abscess.  She is clinically stable, afebrile.  Continue empiric abx for expected polymicrobial infection.  Advance diet as tolerated.  Anticipate discharge later today if tolerating a diet.    The patient asked about holding wegovy.  I advised she can hold this medication until followup with her outpatient physician.    # Diverticulitis complicated by abscess  - empiric abx  - advance diet as tolerated    # HTN    # Anxiety    Addendum:  Patient had a bowel movement and tolerated a diet.  Discharge today.            Diet: Clear Liquid Diet      Curiel Catheter: Not present  Lines: None     Cardiac Monitoring: None  Code Status: Full Code      Clinically Significant Risk Factors        # Hyperkalemia: Highest K = 5.9 mmol/L in last 2 days, will monitor as appropriate            # Hypertension: Noted on problem list            # Obesity: Estimated body mass index is 39.78 kg/m  as calculated from the following:    Height as of this encounter: 1.575 m (5' 2\").    Weight as of this encounter: 98.7 kg (217 lb 8 oz)., PRESENT ON ADMISSION            Social Drivers of Health    Tobacco Use: High Risk (8/5/2024)    Patient History     Smoking Tobacco Use: Some Days     Smokeless Tobacco Use: Never          Disposition Plan     Medically Ready for Discharge: Anticipated Today             Lino Paula MD  Hospitalist Service  [facility]  Securely message with [employer] (more info)  Text page via [employer] Paging/Directory   ______________________________________________________________________    Interval History   Reports bloating.  Denies having a bowel movement.  Has had flatus.  Denies having abdominal pain or nausea today.    Physical " Exam   Vital Signs: Temp: 98.1  F (36.7  C) Temp src: Oral BP: 108/48 Pulse: 81   Resp: 16 SpO2: 93 % O2 Device: None (Room air)    Weight: 217 lbs 8 oz    Gen:  lying in bed in no extremis  Neuro:  alert, conversant  CV:  nl rate, regular rhythm  Pulm:  no acute resp distress, ctab anteriorly  GI:  abdomen soft, NTTP    Medical Decision Making             Data

## 2025-01-01 NOTE — DISCHARGE SUMMARY
"[facility]  Hospitalist Discharge Summary      Date of Admission:  12/30/2024  Date of Discharge:  1/1/2025  Discharging Provider: Lino Paula MD  Discharge Service: Hospitalist Service    Discharge Diagnoses   Diverticulitis complicated by abscess    Clinically Significant Risk Factors     # Obesity: Estimated body mass index is 39.78 kg/m  as calculated from the following:    Height as of this encounter: 1.575 m (5' 2\").    Weight as of this encounter: 98.7 kg (217 lb 8 oz).       Follow-ups Needed After Discharge   Follow-up Appointments       Follow-up and recommended labs and tests       Follow up with primary care provider, Mansi Mccracken, within 7 days for hospital follow- up.  The following labs/tests are recommended:  - followup diverticulitis  - outpatient GI referral placed                Unresulted Labs Ordered in the Past 30 Days of this Admission       Date and Time Order Name Status Description    12/30/2024  2:59 PM Anaerobic Bacterial Culture Routine Preliminary     12/30/2024  2:59 PM Abscess Aerobic Bacterial Culture Routine With Gram Stain Preliminary         These results will be followed up by PCP    Discharge Disposition   Discharged to home  Condition at discharge: Stable    Hospital Course   The patient was admitted with diverticulitis complicated by abscess.  She had IR drainage of the abscess and was treated with antibiotics.  She had clinical improvement with return of bowel function.  She was therefore discharged home with outpatient GI and general surgery referrals.    Consultations This Hospital Stay   INTERVENTIONAL RADIOLOGY ADULT/PEDS IP CONSULT    Code Status   Full Code    Time Spent on this Encounter   I, Lino Paula MD, personally saw the patient today and spent less than or equal to 30 minutes discharging this patient.       Lino Paula MD  [address] " [address]  Phone: [phone]  Fax: [phone]  ______________________________________________________________________    Physical Exam   Vital Signs: Temp: 98  F (36.7  C) Temp src: Oral BP: 131/78 Pulse: 81   Resp: 18 SpO2: 94 % O2 Device: None (Room air)    Weight: 217 lbs 8 oz    See progress note       Primary Care Physician   Mansi Mccracken    Discharge Orders      Adult GI  Referral - Consult Only      Adult Gen Surg  Referral      Reason for your hospital stay    You were admitted to the hospital with diverticulitis complicated by an abscess.  You had the abscess drained and were treated with antibiotics.     Follow-up and recommended labs and tests     Follow up with primary care provider, Mansi Mccracken, within 7 days for hospital follow- up.  The following labs/tests are recommended:  - followup diverticulitis  - outpatient GI referral placed     Activity    Your activity upon discharge: activity as tolerated     When to contact your care team    Seek medical attention if you have any of the following: worsening abdominal pain, uncontrolled nausea or vomiting, persistent fevers, blood in stool, chest pain, chest pressure, or difficulty breathing.     Discharge Instructions    Do not take magnesium or vitamin supplements while taking your antibiotics.  HOLD semaglutide (wegovy) until you see your physician in followup.     Diet    Follow this diet upon discharge: regular diet as tolerated       Significant Results and Procedures   Most Recent 3 CBC's:  Recent Labs   Lab Test 12/31/24  0633 12/30/24  1205 10/18/23  1447 11/17/22  0928   WBC 9.3 13.7*  --  7.2   HGB 11.9 14.3 14.9 14.9   MCV 83 82  --  83    439  --  336     Most Recent 3 BMP's:  Recent Labs   Lab Test 12/31/24  0633 12/30/24  1205 12/30/24  1147 08/05/24  1152     --  135 139   POTASSIUM 3.7 4.3 5.9* 4.1   CHLORIDE 100  --  99 100   CO2 25  --  22 25   BUN 11.4  --  12.0 17.3   CR 0.72  --  0.73 0.90    ANIONGAP 12  --  14 14   TONY 9.4  --  9.8 10.0   GLC 89  --  105* 87   ,   Results for orders placed or performed during the hospital encounter of 12/30/24   CT Abdomen Pelvis w Contrast    Narrative    EXAM: CT ABDOMEN PELVIS W CONTRAST  LOCATION: [facility]  DATE: 12/30/2024    INDICATION: LLQ pain x 1 week, constipation, history of diverticultis  COMPARISON: CT exams 11/30/2018 and 3/6/2018  TECHNIQUE: CT scan of the abdomen and pelvis was performed following injection of IV contrast. Multiplanar reformats were obtained. Dose reduction techniques were used.  CONTRAST: Isovue 370 90ml    FINDINGS:   LOWER CHEST: Normal.    HEPATOBILIARY: Cholecystectomy with stable biliary ectasia likely reflecting reservoir effect. Similar geographic focal fatty change in liver segment 5.    PANCREAS: Normal.    SPLEEN: Normal.    ADRENAL GLANDS: Normal.    KIDNEYS/BLADDER: Bilateral renal cysts. No follow-up is indicated. Small calcifications in the right lower renal cortex reflect milk of calcium within a cyst. No hydronephrosis or hydroureter. Collapsed bladder.    BOWEL: Duodenal diverticulum. No bowel obstruction. Distal colonic diverticulosis. Severe inflammatory changes of the distal descending and proximal sigmoid colon with an associated 3.8 x 2.5 x 5.1 cm intramural abscess along the mesenteric margin of the   inflamed colon. No free air.    LYMPH NODES: Normal.    VASCULATURE: Mild aortic atherosclerosis.    PELVIC ORGANS: Normal pelvic organs. Trace pelvic free fluid.    MUSCULOSKELETAL: Spinal degenerative changes.      Impression    IMPRESSION:   1.  Severe acute diverticulitis of the distal descending and proximal sigmoid colon with an associated 5.1 cm intramural abscess. No evidence for bowel perforation.   CT Abdomen Peritoneum Abscess Aspiration    Narrative    EXAM:  1. PERCUTANEOUS PUNCTURE AND ASPIRATION COLONIC ABSCESS  2. CT GUIDANCE  3. CONSCIOUS SEDATION  [facility]  DATE: 12/30/2024    INDICATION: Intramural diverticular abscess  TECHNIQUE: Dose reduction techniques were used.    PROCEDURE: Informed consent obtained. Site marked. Prior images reviewed. Required items made available. Patient identity confirmed verbally and with arm band. Patient reevaluated immediately before administering sedation. Universal protocol was   followed. Time out performed. The site was prepped and draped in sterile fashion. 10 mL of 1% lidocaine was infused into the local soft tissues. Using standard technique and under direct CT guidance, a 5 Swedish centesis catheter was inserted into the   fluid collection.      SPECIMEN: 32 mL of green purulent fluid was aspirated and sent to lab for cultures and Gram stain.    BLOOD LOSS: Minimal.    The patient tolerated the procedure well. No immediate complications.    SEDATION: Versed 2 mg. Fentanyl 100 mcg. The procedure was performed with administration intravenous conscious sedation with appropriate preoperative, intraoperative, and postoperative evaluation.    15 minutes of supervised face to face conscious sedation time was provided by a radiology nurse under my direct supervision.      Impression    IMPRESSION:  Successful CT-guided puncture and aspiration of descending colonic abscess.    Reference CPT Codes: 59033, 92795       Discharge Medications   Current Discharge Medication List        START taking these medications    Details   ciprofloxacin (CIPRO) 500 MG tablet Take 1 tablet (500 mg) by mouth 2 times daily for 10 days.  Qty: 20 tablet, Refills: 0    Associated Diagnoses: Colonic diverticular abscess      metroNIDAZOLE (FLAGYL) 500 MG tablet Take 1 tablet (500 mg) by mouth 3 times daily for 10 days.  Qty: 30 tablet, Refills: 0    Associated Diagnoses: Colonic diverticular abscess           CONTINUE these medications which have NOT CHANGED    Details   acetaminophen (TYLENOL) 325 MG tablet Take 325-975 mg by mouth  every 6 hours as needed for mild pain (max of 4,000 mg in 24 hours).      cetirizine (ZYRTEC) 10 MG tablet Take 10 mg by mouth every morning.      magnesium oxide (MAG-OX) 400 MG tablet TAKE 1 TABLET BY MOUTH EVERY DAY  Qty: 90 tablet, Refills: 5    Associated Diagnoses: Hypomagnesemia      minoxidil (LONITEN) 2.5 MG tablet Take 1.25 mg by mouth every morning.      omeprazole (PRILOSEC) 20 MG DR capsule TAKE 1 CAPSULE BY MOUTH EVERY DAY  Qty: 90 capsule, Refills: 2    Associated Diagnoses: Gastroesophageal reflux disease without esophagitis      Semaglutide-Weight Management (WEGOVY) 2.4 MG/0.75ML pen INJECT 2.4 MG SUBCUTANEOUS ONCE A WEEK  Qty: 0.75 mL, Refills: 3    Associated Diagnoses: Class 3 severe obesity due to excess calories with serious comorbidity and body mass index (BMI) of 45.0 to 49.9 in adult (H); Dyslipidemia; Metabolic syndrome X; Essential hypertension      venlafaxine (EFFEXOR XR) 75 MG 24 hr capsule TAKE 1 CAPSULE BY MOUTH EVERY DAY  Qty: 90 capsule, Refills: 2    Associated Diagnoses: Generalized anxiety disorder           STOP taking these medications       lisinopril-hydrochlorothiazide (ZESTORETIC) 10-12.5 MG tablet Comments:   Reason for Stopping:         spironolactone (ALDACTONE) 50 MG tablet Comments:   Reason for Stopping:             Allergies   Allergies   Allergen Reactions    Penicillin G Other (See Comments)     Turned yellow    Penicillins Unknown    Sulfa (Sulfonamide Antibiotics) [Sulfa Antibiotics] Rash    Nexium [Esomeprazole] Palpitations     Felt like BP and HR increased/palitations.

## 2025-01-01 NOTE — PLAN OF CARE
Goal Outcome Evaluation:    Discharge paperwork discussed with pt. Questions were answered to patient satisfaction. Alyssa An RN 1/1/2025 12:46 PM

## 2025-01-02 LAB
BACTERIA ABSC ANAEROBE+AEROBE CULT: ABNORMAL
BACTERIA ABSC ANAEROBE+AEROBE CULT: ABNORMAL
BACTERIA ABSC ANAEROBE+AEROBE CULT: NORMAL
GRAM STAIN RESULT: ABNORMAL
GRAM STAIN RESULT: ABNORMAL

## 2025-01-03 PROBLEM — K57.30 DIVERTICULAR DISEASE OF LARGE INTESTINE: Status: ACTIVE | Noted: 2024-10-22

## 2025-01-03 PROBLEM — D12.2 BENIGN NEOPLASM OF ASCENDING COLON: Status: ACTIVE | Noted: 2024-10-24

## 2025-01-04 LAB
BACTERIA ABSC ANAEROBE+AEROBE CULT: ABNORMAL
GRAM STAIN RESULT: ABNORMAL
GRAM STAIN RESULT: ABNORMAL

## 2025-01-06 LAB — BACTERIA ABSC ANAEROBE+AEROBE CULT: ABNORMAL

## 2025-01-07 ENCOUNTER — OFFICE VISIT (OUTPATIENT)
Dept: SURGERY | Facility: CLINIC | Age: 60
End: 2025-01-07
Payer: COMMERCIAL

## 2025-01-07 ENCOUNTER — TELEPHONE (OUTPATIENT)
Dept: SURGERY | Facility: CLINIC | Age: 60
End: 2025-01-07

## 2025-01-07 VITALS — BODY MASS INDEX: 39.01 KG/M2 | WEIGHT: 213.3 LBS

## 2025-01-07 DIAGNOSIS — K57.20 COLONIC DIVERTICULAR ABSCESS: ICD-10-CM

## 2025-01-07 PROCEDURE — 99204 OFFICE O/P NEW MOD 45 MIN: CPT | Performed by: SURGERY

## 2025-01-07 NOTE — PROGRESS NOTES
General Surgery H&P  Gia Sultana MRN# [id number]   Age/Sex: 59 year old female YOB: 1965     Reason for visit: Diverticulitis        Referring physician: Dr. Paula                    Assessment and Plan:   Assessment:  Diverticulitis  Morbid obesity  Use of Wegovy    59-year-old female presenting with her fourth episode of diverticulitis in which this episode resulted in an abscess formation and requirement for IR guided drainage.  The patient is slowly recovering from her recent episode of diverticulitis from December.  She still has tenderness to palpation in the left lower quadrant which is likely indicative of her still having some residual diverticulitis.    Plan:  -Recommendation is for the patient to complete the rest of her 10-day oral antibiotics.  If the patient does not feel well after completion of her antibiotics, would recommend that patient follow-up with primary care team for another prescription for ongoing Cipro Flagyl for an additional 10 days.  If the patient also does require additional antibiotics, I would recommend that she receive a CT scan of the abdomen pelvis to reevaluate to ensure that the diverticulitis is improving.    -Due to her diverticulitis, she will require a colonoscopy in 6 to 8 weeks to reevaluate to ensure that the diverticulitis is resolving.    -Because the patient had a diverticulitis event with abscess formation, I do recommend resecting that portion of the colon.  On evaluation of her October 2024 colonoscopy, the patient is noted to have diverticulosis throughout her entire colon.  Because the patient has diverticulosis throughout the entire colon I cannot fully remove the entire colon.  However surgical intervention would likely result in removal of the portion of the colon that contained the most severe diverticulosis as well as the sigmoid portion which perforated.          Chief Complaint:     Chief Complaint   Patient presents with    Consult      Diverticulitis - CT @          History is obtained from the patient    HPI:   Gia Sultana is a 59 year old female who presents general surgery team today for evaluation regarding diverticulitis.  Patient was recently admitted at the end of December due to a diverticulitis attack in which the patient had an abscess formation.  The patient underwent IR guided aspiration of the abscess.  Patient states that she still feels a little painful in the left lower quadrant despite discharge and oral antibiotics.  Patient has no fevers no chills.  She is still on a low fiber diet.  She has no further complaints.  Patient states that this is the fourth time that she has had diverticulitis.  This is the first episode when she has had an abscess formation.  The patient had her last colonoscopy in 2024 revealing diverticulosis.  Patient has no further complaints at this time.          Past Medical History:     Past Medical History:   Diagnosis Date    Cholelithiasis     Depression     GERD (gastroesophageal reflux disease)     Hypertension               Past Surgical History:     Past Surgical History:   Procedure Laterality Date     SECTION      COLONOSCOPY      one polyp removed.    [facility]  DELIVERY ONLY      Description:  Section;  Recorded: 10/16/2008;  Comments: x2    [facility] LAP,CHOLECYSTECTOMY/EXPLORE      Description: Cholecystectomy Laparoscopic;  Recorded: 04/15/2008;    [facility] REVISN TRACHEA,CERVICAL      Description: Tracheoplasty Cervical;  Recorded: 10/16/2008;  Comments: Trach inserted as a child             Social History:    reports that she has been smoking cigarettes. She has never used smokeless tobacco. She reports current alcohol use. She reports that she does not use drugs.           Family History:     Family History   Problem Relation Age of Onset    Diabetes Mother     Obesity Mother     Hypertension Mother     Cerebrovascular Disease Mother     Dementia Father      Diabetes Brother     Obesity Brother     Diabetes Maternal Grandmother     Thyroid Cancer No family hx of               Allergies:     Allergies   Allergen Reactions    Erythromycin     Penicillin G Other (See Comments)     Turned yellow    Penicillins Unknown    Sulfa (Sulfonamide Antibiotics) [Sulfa Antibiotics] Rash    Nexium [Esomeprazole] Palpitations     Felt like BP and HR increased/palitations.              Medications:     Prior to Admission medications    Medication Sig Start Date End Date Taking? Authorizing Provider   acetaminophen (TYLENOL) 325 MG tablet Take 325-975 mg by mouth every 6 hours as needed for mild pain (max of 4,000 mg in 24 hours).   Yes Reported, Patient   cetirizine (ZYRTEC) 10 MG tablet Take 10 mg by mouth every morning.   Yes Reported, Patient   ciprofloxacin (CIPRO) 500 MG tablet Take 1 tablet (500 mg) by mouth 2 times daily for 10 days. 1/1/25 1/11/25 Yes Lino Paula MD   magnesium oxide (MAG-OX) 400 MG tablet TAKE 1 TABLET BY MOUTH EVERY DAY 4/3/22  Yes Mansi Mccracken MD   metroNIDAZOLE (FLAGYL) 500 MG tablet Take 1 tablet (500 mg) by mouth 3 times daily for 10 days. 1/1/25 1/11/25 Yes Lino Paula MD   minoxidil (LONITEN) 2.5 MG tablet Take 1.25 mg by mouth every morning. 10/29/24  Yes Reported, Patient   omeprazole (PRILOSEC) 20 MG DR capsule TAKE 1 CAPSULE BY MOUTH EVERY DAY 2/7/24  Yes Mansi Mccracken MD   Semaglutide-Weight Management (WEGOVY) 2.4 MG/0.75ML pen INJECT 2.4 MG SUBCUTANEOUS ONCE A WEEK 10/18/24  Yes Rachael Nayak APRN CNP   venlafaxine (EFFEXOR XR) 75 MG 24 hr capsule TAKE 1 CAPSULE BY MOUTH EVERY DAY 11/1/24  Yes Mansi Mccracken MD              Review of Systems:   A 12 point Review of Systems is negative other than noted in the HPI            Physical Exam:   Patient Vitals for the past 24 hrs:   Weight   01/07/25 1250 96.8 kg (213 lb 4.8 oz)        [unfilled]   Constitutional:   awake, alert, cooperative, no apparent distress,  and appears stated age       Eyes:   PERRL, conjunctiva/corneas clear, EOM's intact; no scleral edema or icterus noted        ENT:   Normocephalic, without obvious abnormality, atraumatic, Lips, mucosa, and tongue normal        Hematologic / Lymphatic:   No lymphadenopathy       Lungs:   Normal respiratory effort, no accessory muscle use       Cardiovascular:   Regular rate and rhythm       Abdomen:   Soft, nondistended, LLQ tender to palpation.  Negative rebound.        Musculoskeletal:   No obvious swelling, bruising or deformity       Skin:   Skin color and texture normal for patient, no rashes or lesions              Data:              DO Flaco Blevins DO  General Surgeon  [facility]  Surgery [address] [address]?  Office: [phone]  Employed by - [facility]  Pager: [phone]

## 2025-01-07 NOTE — LETTER
2025    Gia ARANGO Kayy   1965        To Whom it May Concern;    Please excuse Gia Sultana from work/school for a healthcare visit on 2025.  Patient can return to work on 2025.      Sincerely,          Flaco Mendoza DO  General Surgeon  [facility]  Surgery [facility] - [address] [address]?  Office: [phone]  Employed by - [employer]  Pager: [phone]

## 2025-01-07 NOTE — LETTER
1/7/2025      Gia Sultana  [address] [place] [address]      Dear Colleague,    Thank you for referring your patient, Gia Sultana, to the [facility]. Please see a copy of my visit note below.    General Surgery H&P  Gia Sultana MRN# [id number]   Age/Sex: 59 year old female YOB: 1965     Reason for visit: Diverticulitis        Referring physician: Dr. Paula                    Assessment and Plan:   Assessment:  Diverticulitis  Morbid obesity  Use of Wegovy    59-year-old female presenting with her fourth episode of diverticulitis in which this episode resulted in an abscess formation and requirement for IR guided drainage.  The patient is slowly recovering from her recent episode of diverticulitis from December.  She still has tenderness to palpation in the left lower quadrant which is likely indicative of her still having some residual diverticulitis.    Plan:  -Recommendation is for the patient to complete the rest of her 10-day oral antibiotics.  If the patient does not feel well after completion of her antibiotics, would recommend that patient follow-up with primary care team for another prescription for ongoing Cipro Flagyl for an additional 10 days.  If the patient also does require additional antibiotics, I would recommend that she receive a CT scan of the abdomen pelvis to reevaluate to ensure that the diverticulitis is improving.    -Due to her diverticulitis, she will require a colonoscopy in 6 to 8 weeks to reevaluate to ensure that the diverticulitis is resolving.    -Because the patient had a diverticulitis event with abscess formation, I do recommend resecting that portion of the colon.  On evaluation of her October 2024 colonoscopy, the patient is noted to have diverticulosis throughout her entire colon.  Because the patient has diverticulosis throughout the entire colon I cannot fully remove the entire colon.   However surgical intervention would likely result in removal of the portion of the colon that contained the most severe diverticulosis as well as the sigmoid portion which perforated.          Chief Complaint:     Chief Complaint   Patient presents with     Consult     Diverticulitis - CT @ [facility]         History is obtained from the patient    HPI:   Gia Sultana is a 59 year old female who presents general surgery team today for evaluation regarding diverticulitis.  Patient was recently admitted at the end of December due to a diverticulitis attack in which the patient had an abscess formation.  The patient underwent IR guided aspiration of the abscess.  Patient states that she still feels a little painful in the left lower quadrant despite discharge and oral antibiotics.  Patient has no fevers no chills.  She is still on a low fiber diet.  She has no further complaints.  Patient states that this is the fourth time that she has had diverticulitis.  This is the first episode when she has had an abscess formation.  The patient had her last colonoscopy in 2024 revealing diverticulosis.  Patient has no further complaints at this time.          Past Medical History:     Past Medical History:   Diagnosis Date     Cholelithiasis      Depression      GERD (gastroesophageal reflux disease)      Hypertension               Past Surgical History:     Past Surgical History:   Procedure Laterality Date      SECTION       COLONOSCOPY      one polyp removed.     [facility]  DELIVERY ONLY      Description:  Section;  Recorded: 10/16/2008;  Comments: x2     [facility] LAP,CHOLECYSTECTOMY/EXPLORE      Description: Cholecystectomy Laparoscopic;  Recorded: 04/15/2008;     [facility] REVISN TRACHEA,CERVICAL      Description: Tracheoplasty Cervical;  Recorded: 10/16/2008;  Comments: Trach inserted as a child             Social History:    reports that she has been smoking cigarettes. She has never used smokeless tobacco.  She reports current alcohol use. She reports that she does not use drugs.           Family History:     Family History   Problem Relation Age of Onset     Diabetes Mother      Obesity Mother      Hypertension Mother      Cerebrovascular Disease Mother      Dementia Father      Diabetes Brother      Obesity Brother      Diabetes Maternal Grandmother      Thyroid Cancer No family hx of               Allergies:     Allergies   Allergen Reactions     Erythromycin      Penicillin G Other (See Comments)     Turned yellow     Penicillins Unknown     Sulfa (Sulfonamide Antibiotics) [Sulfa Antibiotics] Rash     Nexium [Esomeprazole] Palpitations     Felt like BP and HR increased/palitations.              Medications:     Prior to Admission medications    Medication Sig Start Date End Date Taking? Authorizing Provider   acetaminophen (TYLENOL) 325 MG tablet Take 325-975 mg by mouth every 6 hours as needed for mild pain (max of 4,000 mg in 24 hours).   Yes Reported, Patient   cetirizine (ZYRTEC) 10 MG tablet Take 10 mg by mouth every morning.   Yes Reported, Patient   ciprofloxacin (CIPRO) 500 MG tablet Take 1 tablet (500 mg) by mouth 2 times daily for 10 days. 1/1/25 1/11/25 Yes Lino Paula MD   magnesium oxide (MAG-OX) 400 MG tablet TAKE 1 TABLET BY MOUTH EVERY DAY 4/3/22  Yes Mansi Mccracken MD   metroNIDAZOLE (FLAGYL) 500 MG tablet Take 1 tablet (500 mg) by mouth 3 times daily for 10 days. 1/1/25 1/11/25 Yes Lino Paula MD   minoxidil (LONITEN) 2.5 MG tablet Take 1.25 mg by mouth every morning. 10/29/24  Yes Reported, Patient   omeprazole (PRILOSEC) 20 MG DR capsule TAKE 1 CAPSULE BY MOUTH EVERY DAY 2/7/24  Yes Mansi Mccracken MD   Semaglutide-Weight Management (WEGOVY) 2.4 MG/0.75ML pen INJECT 2.4 MG SUBCUTANEOUS ONCE A WEEK 10/18/24  Yes Rachael Nayak APRN CNP   venlafaxine (EFFEXOR XR) 75 MG 24 hr capsule TAKE 1 CAPSULE BY MOUTH EVERY DAY 11/1/24  Yes Mansi Mccracken MD              Review of  Systems:   A 12 point Review of Systems is negative other than noted in the HPI            Physical Exam:   Patient Vitals for the past 24 hrs:   Weight   01/07/25 1250 96.8 kg (213 lb 4.8 oz)        [unfilled]   Constitutional:   awake, alert, cooperative, no apparent distress, and appears stated age       Eyes:   PERRL, conjunctiva/corneas clear, EOM's intact; no scleral edema or icterus noted        ENT:   Normocephalic, without obvious abnormality, atraumatic, Lips, mucosa, and tongue normal        Hematologic / Lymphatic:   No lymphadenopathy       Lungs:   Normal respiratory effort, no accessory muscle use       Cardiovascular:   Regular rate and rhythm       Abdomen:   Soft, nondistended, LLQ tender to palpation.  Negative rebound.        Musculoskeletal:   No obvious swelling, bruising or deformity       Skin:   Skin color and texture normal for patient, no rashes or lesions              Data:              DO Flaco Blevins DO  General Surgeon  [facility]  Surgery [facility] - [address] [address]?  Office: [phone]  Employed by - [employer] [facility] [employer]  Pager: [phone]     Again, thank you for allowing me to participate in the care of your patient.        Sincerely,        Flaco Mendoza DO    Electronically signed

## 2025-01-07 NOTE — LETTER
Pre-op Physical: To be done by Dr Mendoza day of procedure.    Surgery Date: 2/19/2025     Location: [place] [address]    Approximate Arrival Time: 11:00 am  (Unless instructed differently by the pre-op call nurse)     Post op Appointment: As needed.    Pre-Surgical Tasks:     Review all medications with your primary care or prescribing physician; they will advise you which meds to stop and when, and when you can resume taking.  Certain medications like blood thinners and weight loss medications need to be stopped in advance of surgery to proceed safely.      Blood thinners including but not exclusive to drugs like Xarelto, Eliquis, Warfarin and Aspirin, should be stopped five days before surgery, if your prescribing provider agrees. Follow your provider's advice on stopping blood thinners because they know you best.  If you are unsure if your medication is a blood thinner, ask your prescribing provider.    Weight loss medications: There are multiple medications being used for weight management and diabetes today, and the list is growing.  Phentermine, Ozempic, Wegovy, Trulicity, and other similar medications need to be stopped one week before surgery to avoid being cancelled.  Victoza and Saxenda can be continued longer but must be stopped one full day before surgery.  Please ask your prescribing provider for advice.    Diabetic medications: in addition to the medications talked about above that are used for either weight loss or diabetes, some people are on insulin that may require adjustment.  Please discuss managing diabetic medications with your prescribing doctor as these medications may require modification prior to surgery.     Please shower the evening before and morning of surgery with Hibiclens soap.  This can be found at your local pharmacy.     Fasting instructions will be provided by the pre-op nurse who will call you 1-3 days before surgery.   Typically, we advise normal food up to 8 hours before you arrive for surgery. Clear liquids only from then until 2 hours before you arrive surgery, then nothing at all by mouth.  The nurse will review your specific instructions with you at the call.      Smoking impacts your body's ability to heal properly so we advise patients to quit if possible before surgery.  Plastic Surgery patients are required to be nicotine free for at least 8 weeks before surgery.      You will need an adult to drive you home and stay with you 24 hours after surgery. Public transportation or Medical Van Services are not permitted.    Visitor restrictions are subject to change, please verify with the pre-op nurse when they call how many people are permitted to accompany you.    We always encourage you to notify your insurance any time you have medical tests or procedures scheduled including surgery. The number is usually right on the back of your insurance card. To obtain pricing for surgery, please call [facility] of [facility] at [phone] or email [email or web address].        Call our office if you have any questions! Thank you!     Jose Daniels  Complex   [facility]  [phone]            Standard MiraLAX Bowel Prep  Prep Instructions for your Colonoscopy    Please read these instructions carefully at least 7 days prior to your colonoscopy procedure. Be sure to follow all directions completely. The inside of your colon must be clean to allow for a complete examination for the presence of any growths, polyps, and/or abnormalities, as well as their biopsy or removal. A number of tips are included in order to make this part of the procedure as comfortable as possible.    Getting ready:   A nurse will call you to go over instructions and your health history.  It's important to complete the nurse assessment before your procedure. If you don't, your procedure might have to be canceled.  You must  arrange for an adult to drive you home after your exam. Your colonoscopy cannot be done unless you have a ride. If you need to use public transportation, someone must ride with you and stay with you for a minimum of 24 hours.  Check with your insurance company to be sure they will cover this exam.  Go to the drug store and buy:  Four (4) - Dulcolax (Bisacodyl) 5mg tablets   8.3 ounce bottle of Miralax powder  64 ounces of Gatorade (not red or purple)     10 ounce bottle of clear magnesium citrate    7 days before the exam:  Talk to your prescribing provider: If you take blood thinners (such as Coumadin, Plavix, Xarelto, Eliquis, Lovenox, or others), these medications may need to be stopped temporarily before your procedure. Your prescribing provider will tell you what to do.   Talk to your prescribing provider: If you take prescription NSAIDS (such as Sulindac, Celebrex, Mobic, Relafen). Your prescribing provider will tell you what to do.   Stop taking fiber and iron supplements   Stop eating corn, popcorn, nuts and foods that contain seeds. These can stay in the colon for many days and they can clog up the colonoscope.     3 days before the exam:  Begin a low-fiber diet (see below).   Drink at least 4 to 6 large glasses of water or sports drink (not red or purple) each day.     One day before the exam:  Only clear liquid diet is allowed (see below). No solid food should be eaten.   Drink at least 8 to 10 full glasses of clear liquids during the day.   Stop taking NSAID pain relievers, such as Advil, Ibuprofen, Motrin, etc.  You may take Tylenol.  Note: You will start drinking the colonoscopy prep solution at 5 PM. The timing of second step depends on your appointment arrival time. See Steps 1-2 below.    Step One:  At 4 PM, take 2 Dulcolax (Bisacodyl) tablets.   At 4 PM, mix the entire bottle of Miralax with 64 ounces of Gatorade in a pitcher and stir to dissolve the powder. Chill if desired, but do not add ice.    At 5 PM, start drinking an 8-ounce glass of the Miralax and Gatorade mixture every 15 minutes until the pitcher is HALF empty (about 4 glasses).  Store the rest in the refrigerator.   Bowel movements usually begin about one hour after your finish this first dose of Miralax. The stool is likely to be brown at this stage.   After you start drinking the solution, stay near a toilet. You may have watery stools (diarrhea), mild cramping, bloating , and nausea.   You may want to use Vaseline on the skin around your anus after each bowel movement to prevent irritation. You can also use wet wipes to prevent irritation.  Continue to drink clear liquids.     At 10 PM, take 2 Dulcolax (Bisacodyl) tablets  At 10 PM start drinking an 8-ounce glass of Miralax and Gatorade mixture every 15 minutes until the pitcher is empty (about 4 glasses).       Step Two:   If you arrive for your procedure before 11 AM:    6 hours prior to your scheduled arrival to the endoscopy unit drink 10 ounces of clear Magnesium Citrate    If you arrive for your procedure after 11 AM:     At 6 AM on the day of the exam: drink 10 ounces of clear Magnesium Citrate        Day of exam:  You may drink clear liquids only up until 2 hours before your arrival time.  You may take your necessary morning medications with sips of water  Do not take diabetes medicine by mouth until after your exam.  Do not smoke, chew tobacco, or swallow anything, including water or gum for at least 2 hours before your arrival time. This is a safety issue. Your procedure could be cancelled if you do not follow directions.  Please do not wear jewelry (i.e. earrings, rings, necklaces, watches, etc) . Leave your purse, billfold, credit cards, and other valuables at home.    Please arrive with a responsible adult who can take you home after the test and stay with you for a minimum of 24 hours: The medicine used will make you sleepy and forgetful. If you do not have someone to take you  home, we will cancel your procedure. If using public transportation you must have someone to ride with you.  Please perform your nebulizer treatments and airway clearance therapy in the morning prior to the procedure (if applicable).  If you have asthma, bring your inhalers.       CLEAR LIQUID DIET   You may have:    Water, tea, coffee (no milk or cream)  Soda pop, Gatorade (not red or purple)  Jell-O, Popsicles (no milk or fruit pieces - not red or purple)  Fat-free soup broth or bouillon  Plain hard candy, such as clear life savers (not red or purple)  Clear juices and fruit-flavored drinks, such as apple juice, white grape juice, Hi-C, and Bhavesh-Aid (not red or purple)   Do not have:    Milk or milk products such as ice cream, malts or shakes, or coffee creamer  Red or purple drinks of any kind such as cranberry juice or grape juice. Avoid red or purple Jell-O, Popsicles, Bhavesh-Aid, sorbet, sherbet and candy  Juices with pulp such as orange, grapefruit, pineapple or tomato juice  Cream soups of any kind  Alcohol and beer  Protein drinks or protein powder     LOW FIBER DIET   You may have:    Starches: White bread, rolls, biscuits, croissants, Marla toast, white flour tortillas, waffles, pancakes, Vietnamese toast; white rice, noodles, pasta, macaroni; cooked and peeled potatoes; plain crackers, saltines; cooked farina or cream of rice; puffed rice, corn flakes, Rice Krispies, Special K   Vegetables: tender cooked and canned, vegetable broths  Fruits and fruit juices: Strained fruit juice, canned fruit without seeds or skin (not pineapple), applesauce, pear sauce, ripe bananas, melons (not watermelon)   Milk products: Milk (plain or flavored), cheese, cottage cheese, yogurt (no berries), custard, ice cream    Proteins: Tender, well-cooked ground beef, lamb, veal, ham, pork, chicken, turkey, fish or organ meats; eggs; creamy peanut butter   Fats and condiments:  Margarine, butter, oils, mayonnaise, sour cream, salad  dressing, plain gravy; spices, cooked herbs; sugar, clear jelly, honey, syrup   Snacks, sweets and drinks: Pretzels, hard candy; plain cakes and cookies (no nuts or seeds); gelatin, plain pudding, sherbet, Popsicles; coffee, tea, carbonated ( fizzy ) drinks Do not have:    Starches: Breads or rolls that contain nuts, seeds or fruit; whole wheat or whole grain breads that contain more than 1 gram of fiber per slice; cornbread; corn or whole wheat tortillas; potatoes with skin; brown rice, wild rice, kasha (buckwheat), and oatmeal   Vegetables: Any raw or steamed vegetables; vegetables with seeds; corn in any form   Fruits and fruit juices: Prunes, prune juice, raisins and other dried fruits, berries and other fruits with seeds, canned pineapple juices with pulp such as orange, grapefruit, pineapple or tomato juice  Milk products: Any yogurt with nuts, seeds or berries   Proteins: Tough, fibrous meats with gristle; cooked dried beans, peas or lentils; crunchy peanut butter  Fats and condiments: Pickles, olives, relish, horseradish; jam, marmalade, preserves   Snacks, sweets and drinks: Popcorn, nuts, seeds, granola, coconut, candies made with nuts or seeds; all desserts that contain nuts, seeds, raisins and other dried fruits, coconut, whole grains or bran.      FAQ:    Why should Miralax be mixed with Gatorade or another clear sports drink?   It is important that your body does not develop an imbalance of electrolytes with the large volume of fluid in this prep. Gatorade/sports drinks contains those electrolytes.   Does Miralax have to be mixed with Gatorade?  Gatorade, Powerade, or any of the other sports drinks are acceptable. If you are diabetic, it is acceptable to use the low sugar options, such as Gatorade Zero, Powerade Zero, G2, etc.  Can I put ice in the Gatorade/Miralax mixture?  We prefer that you mix it and put it in the refrigerator to chill instead of using ice. The ice will melt and dilute the  laxative.    Why should the Miralax prep be taken in several steps?   The stool is flushed out by a large wave of fluid going through the colon. Just sipping a large volume of the solution will not achieve the desired result. Studies have shown that two smaller waves (or more in some cases) are better than one large one.    Why are the second Miralax dose and Magnesium Citrate so close to the exam?   The intestine continues to produce mucus and waste. Longer intervals between the prep and the exam can lead to less than desired results. However, the stomach must be empty at the time of the exam in order to allow safe sedation. Therefore, there should be nothing by mouth 2 hours before the exam is started.   How do you know if your colon is cleaned out?   After completing the bowel prep, your bowel movements should be all liquid and yellow. Your bowel movements will look similar to urine in the toilet. If there are pieces of stool (poop) in the toilet, or if you can't see to the bottom of the toilet, please call our office for advice. Call [phone] and ask to speak with a nurse.   Why do you need a responsible  to take you home and stay with you?  We require a responsible adult to take you home for your safety. The sedation medicines used to relax you during the procedure can impair your judgement and reaction time, and make you forgetful and possibly a little unsteady. Do not drive, make any important decisions, or sign any legal documents for 24 hours after your procedure.   It is normal to feel bloated and gassy after your procedure. Walking will help move the air through your colon. You can take non-aspirin pain relievers that contain acetaminophen (Tylenol).     When can you eat after your procedure?  You may resume your normal diet when you feel ready, unless advised otherwise by the doctor performing your procedure. Do not drink alcohol for 24 hours after your procedure.   You many resume normal  activities (work, exercise, etc.) after 24 hours.     When will you get test results?  You should have your procedure results and any lab results (if applicable) by letter, MyChart message, or phone call within 2 weeks. If you have any questions, please call the doctor that referred you for the procedure.

## 2025-01-09 ENCOUNTER — OFFICE VISIT (OUTPATIENT)
Dept: FAMILY MEDICINE | Facility: CLINIC | Age: 60
End: 2025-01-09
Payer: COMMERCIAL

## 2025-01-09 ENCOUNTER — OFFICE VISIT (OUTPATIENT)
Dept: FAMILY MEDICINE | Facility: CLINIC | Age: 60
End: 2025-01-09
Attending: FAMILY MEDICINE
Payer: COMMERCIAL

## 2025-01-09 VITALS
WEIGHT: 212.3 LBS | HEIGHT: 61 IN | OXYGEN SATURATION: 96 % | RESPIRATION RATE: 17 BRPM | TEMPERATURE: 97.9 F | HEART RATE: 85 BPM | SYSTOLIC BLOOD PRESSURE: 116 MMHG | DIASTOLIC BLOOD PRESSURE: 82 MMHG | BODY MASS INDEX: 40.08 KG/M2

## 2025-01-09 VITALS
HEIGHT: 62 IN | RESPIRATION RATE: 14 BRPM | TEMPERATURE: 98.1 F | OXYGEN SATURATION: 93 % | SYSTOLIC BLOOD PRESSURE: 126 MMHG | WEIGHT: 213.4 LBS | HEART RATE: 104 BPM | BODY MASS INDEX: 39.27 KG/M2 | DIASTOLIC BLOOD PRESSURE: 83 MMHG

## 2025-01-09 DIAGNOSIS — K57.20 COLONIC DIVERTICULAR ABSCESS: ICD-10-CM

## 2025-01-09 DIAGNOSIS — E88.810 METABOLIC SYNDROME X: ICD-10-CM

## 2025-01-09 DIAGNOSIS — I10 ESSENTIAL HYPERTENSION: ICD-10-CM

## 2025-01-09 DIAGNOSIS — E66.01 CLASS 2 SEVERE OBESITY DUE TO EXCESS CALORIES WITH SERIOUS COMORBIDITY AND BODY MASS INDEX (BMI) OF 39.0 TO 39.9 IN ADULT (H): ICD-10-CM

## 2025-01-09 DIAGNOSIS — E66.812 CLASS 2 SEVERE OBESITY DUE TO EXCESS CALORIES WITH SERIOUS COMORBIDITY AND BODY MASS INDEX (BMI) OF 39.0 TO 39.9 IN ADULT (H): ICD-10-CM

## 2025-01-09 DIAGNOSIS — E78.5 DYSLIPIDEMIA: ICD-10-CM

## 2025-01-09 RX ORDER — SEMAGLUTIDE 2.4 MG/.75ML
2.4 INJECTION, SOLUTION SUBCUTANEOUS WEEKLY
Qty: 3 ML | Refills: 5 | Status: SHIPPED | OUTPATIENT
Start: 2025-01-09

## 2025-01-09 RX ORDER — CIPROFLOXACIN 500 MG/1
500 TABLET, FILM COATED ORAL 2 TIMES DAILY
Qty: 20 TABLET | Refills: 0 | Status: SHIPPED | OUTPATIENT
Start: 2025-01-09

## 2025-01-09 RX ORDER — METRONIDAZOLE 500 MG/1
500 TABLET ORAL 3 TIMES DAILY
Qty: 30 TABLET | Refills: 0 | Status: SHIPPED | OUTPATIENT
Start: 2025-01-09

## 2025-01-09 ASSESSMENT — PAIN SCALES - GENERAL: PAINLEVEL_OUTOF10: MILD PAIN (2)

## 2025-01-09 NOTE — PATIENT INSTRUCTIONS
Wegovy (or Ozempic) aka semaglutide: cost per month retail ~$1400      Zepbound (or Mounjaro) aka tirzepatide: cost per month retail ~$1100 (there is an online coupon)

## 2025-01-09 NOTE — PROGRESS NOTES
Assessment & Plan     Colonic diverticular abscess  Significant improvement though not yet at baseline with persistent significant exhaustion, mild ache and discomfort in her abdomen coming and going but significantly improved from baseline.  She has just 1 or 2 more days of antibiotics.  In reviewing instructions by surgery, we discussed options.  I am going to extend her antibiotic for additional days to achieve a full 14-day course.  She will then update me early next week.  If her symptoms have not significantly improved in the interim and if she is not feeling ready to go back to work in 5 days as scheduled, we will plan to proceed with CT scan.  She is comfortable with this.  She will otherwise work with surgery team to schedule follow-up colonoscopy and later surgery as previously discussed.  - metroNIDAZOLE (FLAGYL) 500 MG tablet; Take 1 tablet (500 mg) by mouth 3 times daily.  - ciprofloxacin (CIPRO) 500 MG tablet; Take 1 tablet (500 mg) by mouth 2 times daily.    Encourage consideration of physical therapy for previous cervical radiculopathy.  She will consider.    MED REC REQUIRED  Post Medication Reconciliation Status: discharge medications reconciled and changed, per note/orders        Subjective   Gia is a 59 year old, presenting for the following health issues:  Hospital F/U (Referred to surgeon, talk about, and talk about qty of antibiotic given)    Seen in clinic today for follow-up of hospitalization from 12/30/2024 through 1/1/2025.  Previous history of diverticulitis, had another bout of what she thought was mild diverticulitis associated with constipation and bloating.  When developing worsening pain, seen in emergency room where she was found to have perforation of diverticulum and abscess formation.  This was drained through interventional radiology, did not require placement of drain.  Treated initially with IV antibiotics and then transition to oral antibiotics.  Since then she is  feeling significantly better but still exhausted.  No fevers.  Has some aching in her abdomen more than actual pain, present perhaps 15% of the time.  She has been spending much of her time laying down or sleeping, much less active than usual.  Feels like she is not drinking enough and is feeling a little dehydrated but no vomiting or diarrhea, having small slightly loose bowel movements.  Eating primarily bland and easy to digest foods.  Visit on 1/7/2025 with Dr. Mendoza, recommendation for colonoscopy in 6 to 8 weeks with future plans for colon resection.  She is nervous about this.    Last year in the fall she had an epidural steroid injection in her neck for left cervical radiculopathy with resolution of all symptoms.  She is very pleased with this.               1/3/2025   Post Discharge Outreach   How are you doing now that you are home? getting some sleep, abdominal pain is manageable -mild, did not have pain when left hospital (was on IV meds)   How are your symptoms? (Red Flag symptoms escalate to triage hotline per guidelines) Improved   Does the patient have their discharge instructions?  Yes   Does the patient have questions regarding their discharge instructions?  No   Were you started on any new medications or were there changes to any of your previous medications?  Yes   Does the patient have all of their medications? Yes   Do you have questions regarding any of your medications?  No   Do you have all of your needed medical supplies or equipment (DME)?  (i.e. oxygen tank, CPAP, cane, etc.) Yes   Discharge Follow Up Appointment Date 1/9/2025   Discharge Follow Up Appointment Scheduled with? Primary Care Provider       Hospital Follow-up Visit:    Hospital/Nursing Home/IP Rehab Facility: [facility]  Date of Admission: 12-30-24  Date of Discharge: 1-1-25  Reason(s) for Admission: diverticulitis  Was the patient in the ICU or did the patient experience delirium during  "hospitalization?  No  Do you have any other stressors you would like to discuss with your provider? No    Problems taking medications regularly:  None  Medication changes since discharge: None  Problems adhering to non-medication therapy:  None    Summary of hospitalization:  [facility] discharge summary reviewed  Diagnostic Tests/Treatments reviewed.  Follow up needed: none  Other Healthcare Providers Involved in Patient s Care:          Surgery  Update since discharge: improved.         Plan of care communicated with patient                       Objective    /82   Pulse 85   Temp 97.9  F (36.6  C) (Oral)   Resp 17   Ht 1.549 m (5' 1\")   Wt 96.3 kg (212 lb 4.8 oz)   SpO2 96%   BMI 40.11 kg/m    Body mass index is 40.11 kg/m .  Physical Exam   Alert very pleasant.  Heart with regular rate and rhythm.  Lungs clear.  Abdomen is soft.  Small bruises from anticoagulant injection.  Location of drain procedure very well-healed.  She has mild tenderness palpation left lower quadrant and left mid abdomen, no rebound or guarding, no masses or induration.            Signed Electronically by: Mansi Mccracken MD    "

## 2025-01-09 NOTE — PATIENT INSTRUCTIONS
Take your antibiotics for 4 more days.  Update me next Monday with how you are feeling.  If you are not feeling close to normal and ready to go back to work on Tuesday, we will plan to perform a CT scan and extend your antibiotics to complete the full additional 10 day prescription.

## 2025-01-09 NOTE — PROGRESS NOTES
"  {PROVIDER CHARTING PREFERENCE:067940}    Subjective   Gia is a 59 year old, presenting for the following health issues:  weight loss follow up      1/9/2025     7:52 AM   Additional Questions   Roomed by ac   Accompanied by self     History of Present Illness       Reason for visit:  Weight loss    She eats 4 or more servings of fruits and vegetables daily.She consumes 0 sweetened beverage(s) daily.She exercises with enough effort to increase her heart rate 10 to 19 minutes per day.  She exercises with enough effort to increase her heart rate 3 or less days per week.   She is taking medications regularly.       {MA/LPN/RN Pre-Provider Visit Orders- hCG/UA/Strep (Optional):044335}  {SUPERLIST (Optional):980557}  {additonal problems for provider to add (Optional):477287}    {ROS Picklists (Optional):597531}      Objective    /83 (BP Location: Left arm, Patient Position: Sitting, Cuff Size: Adult Large)   Pulse 104   Temp 98.1  F (36.7  C) (Oral)   Resp 14   Ht 1.575 m (5' 2\")   Wt 96.8 kg (213 lb 6.4 oz)   SpO2 93%   BMI 39.03 kg/m    Body mass index is 39.03 kg/m .  Physical Exam   {Exam List (Optional):136680}    {Diagnostic Test Results (Optional):328141}        Signed Electronically by: Miki Nicholas MD  {Email feedback regarding this note to [email or web address]   :717669}  "

## 2025-01-09 NOTE — PROGRESS NOTES
This patient has a primary care visit with her PCP later today.  As result of this we made this a nurse visit only with her weight and height.  She is down 45 pounds since starting semaglutide.  This corresponds to 17% of total body weight.

## 2025-01-18 ENCOUNTER — HEALTH MAINTENANCE LETTER (OUTPATIENT)
Age: 60
End: 2025-01-18

## 2025-02-18 ENCOUNTER — ANESTHESIA EVENT (OUTPATIENT)
Dept: SURGERY | Facility: AMBULATORY SURGERY CENTER | Age: 60
End: 2025-02-18
Payer: COMMERCIAL

## 2025-02-19 ENCOUNTER — ANESTHESIA (OUTPATIENT)
Dept: SURGERY | Facility: AMBULATORY SURGERY CENTER | Age: 60
End: 2025-02-19
Payer: COMMERCIAL

## 2025-02-19 ENCOUNTER — HOSPITAL ENCOUNTER (OUTPATIENT)
Facility: AMBULATORY SURGERY CENTER | Age: 60
Discharge: HOME | End: 2025-02-19
Attending: SURGERY
Payer: COMMERCIAL

## 2025-02-19 VITALS
RESPIRATION RATE: 16 BRPM | HEART RATE: 84 BPM | BODY MASS INDEX: 38.28 KG/M2 | DIASTOLIC BLOOD PRESSURE: 71 MMHG | WEIGHT: 208 LBS | OXYGEN SATURATION: 100 % | SYSTOLIC BLOOD PRESSURE: 141 MMHG | HEIGHT: 62 IN | TEMPERATURE: 97.5 F

## 2025-02-19 DIAGNOSIS — K57.20 COLONIC DIVERTICULAR ABSCESS: ICD-10-CM

## 2025-02-19 LAB — COLONOSCOPY: NORMAL

## 2025-02-19 RX ORDER — ONDANSETRON 2 MG/ML
4 INJECTION INTRAMUSCULAR; INTRAVENOUS EVERY 30 MIN PRN
Status: DISCONTINUED | OUTPATIENT
Start: 2025-02-19 | End: 2025-02-20 | Stop reason: HOSPADM

## 2025-02-19 RX ORDER — PROPOFOL 10 MG/ML
INJECTION, EMULSION INTRAVENOUS CONTINUOUS PRN
Status: DISCONTINUED | OUTPATIENT
Start: 2025-02-19 | End: 2025-02-19

## 2025-02-19 RX ORDER — LIDOCAINE HYDROCHLORIDE 20 MG/ML
INJECTION, SOLUTION INFILTRATION; PERINEURAL PRN
Status: DISCONTINUED | OUTPATIENT
Start: 2025-02-19 | End: 2025-02-19

## 2025-02-19 RX ORDER — SODIUM CHLORIDE, SODIUM LACTATE, POTASSIUM CHLORIDE, CALCIUM CHLORIDE 600; 310; 30; 20 MG/100ML; MG/100ML; MG/100ML; MG/100ML
INJECTION, SOLUTION INTRAVENOUS CONTINUOUS
Status: DISCONTINUED | OUTPATIENT
Start: 2025-02-19 | End: 2025-02-20 | Stop reason: HOSPADM

## 2025-02-19 RX ORDER — PROPOFOL 10 MG/ML
INJECTION, EMULSION INTRAVENOUS PRN
Status: DISCONTINUED | OUTPATIENT
Start: 2025-02-19 | End: 2025-02-19

## 2025-02-19 RX ORDER — ONDANSETRON 4 MG/1
4 TABLET, ORALLY DISINTEGRATING ORAL EVERY 30 MIN PRN
Status: DISCONTINUED | OUTPATIENT
Start: 2025-02-19 | End: 2025-02-20 | Stop reason: HOSPADM

## 2025-02-19 RX ORDER — OXYCODONE HYDROCHLORIDE 5 MG/1
5 TABLET ORAL
Status: DISCONTINUED | OUTPATIENT
Start: 2025-02-19 | End: 2025-02-20 | Stop reason: HOSPADM

## 2025-02-19 RX ORDER — OXYCODONE HYDROCHLORIDE 10 MG/1
10 TABLET ORAL
Status: DISCONTINUED | OUTPATIENT
Start: 2025-02-19 | End: 2025-02-20 | Stop reason: HOSPADM

## 2025-02-19 RX ORDER — DEXAMETHASONE SODIUM PHOSPHATE 4 MG/ML
4 INJECTION, SOLUTION INTRA-ARTICULAR; INTRALESIONAL; INTRAMUSCULAR; INTRAVENOUS; SOFT TISSUE
Status: DISCONTINUED | OUTPATIENT
Start: 2025-02-19 | End: 2025-02-20 | Stop reason: HOSPADM

## 2025-02-19 RX ORDER — NALOXONE HYDROCHLORIDE 0.4 MG/ML
0.1 INJECTION, SOLUTION INTRAMUSCULAR; INTRAVENOUS; SUBCUTANEOUS
Status: DISCONTINUED | OUTPATIENT
Start: 2025-02-19 | End: 2025-02-20 | Stop reason: HOSPADM

## 2025-02-19 RX ORDER — LIDOCAINE 40 MG/G
CREAM TOPICAL
Status: DISCONTINUED | OUTPATIENT
Start: 2025-02-19 | End: 2025-02-20 | Stop reason: HOSPADM

## 2025-02-19 RX ADMIN — LIDOCAINE HYDROCHLORIDE 2.5 ML: 20 INJECTION, SOLUTION INFILTRATION; PERINEURAL at 11:47

## 2025-02-19 RX ADMIN — PROPOFOL 150 MCG/KG/MIN: 10 INJECTION, EMULSION INTRAVENOUS at 11:47

## 2025-02-19 RX ADMIN — PROPOFOL 50 MG: 10 INJECTION, EMULSION INTRAVENOUS at 11:47

## 2025-02-19 RX ADMIN — SODIUM CHLORIDE, SODIUM LACTATE, POTASSIUM CHLORIDE, CALCIUM CHLORIDE: 600; 310; 30; 20 INJECTION, SOLUTION INTRAVENOUS at 11:23

## 2025-02-19 NOTE — ANESTHESIA POSTPROCEDURE EVALUATION
Patient: Gia Sultana    Procedure: Procedure(s):  COLONOSCOPY with polypectomy       Anesthesia Type:  MAC    Note:  Disposition: Outpatient   Postop Pain Control: Uneventful            Sign Out: Well controlled pain   PONV: No   Neuro/Psych: Uneventful            Sign Out: Acceptable/Baseline neuro status   Airway/Respiratory: Uneventful            Sign Out: Acceptable/Baseline resp. status   CV/Hemodynamics: Uneventful            Sign Out: Acceptable CV status; No obvious hypovolemia; No obvious fluid overload   Other NRE: NONE   DID A NON-ROUTINE EVENT OCCUR? No    Event details/Postop Comments:  Patient recovering comfortably.        Last vitals:  Vitals Value Taken Time   /71 02/19/25 1250   Temp 97.5  F (36.4  C) 02/19/25 1212   Pulse 84 02/19/25 1250   Resp 16 02/19/25 1250   SpO2 100 % 02/19/25 1250       Electronically Signed By: Lavelle England DO  February 19, 2025  1:18 PM

## 2025-02-19 NOTE — ANESTHESIA PREPROCEDURE EVALUATION
Anesthesia Pre-Procedure Evaluation    Patient: Gia Sultana   MRN: [id number] : 1965        Procedure : Procedure(s):  COLONOSCOPY          Past Medical History:   Diagnosis Date    Arthritis     lower back and knees    Cholelithiasis     Depression     GERD (gastroesophageal reflux disease)     Obese       Past Surgical History:   Procedure Laterality Date     SECTION      COLONOSCOPY      one polyp removed.    [facility]  DELIVERY ONLY      Description:  Section;  Recorded: 10/16/2008;  Comments: x2    [facility] LAP,CHOLECYSTECTOMY/EXPLORE      Description: Cholecystectomy Laparoscopic;  Recorded: 04/15/2008;    [facility] REVISN TRACHEA,CERVICAL      Description: Tracheoplasty Cervical;  Recorded: 10/16/2008;  Comments: Trach inserted as a child      Allergies   Allergen Reactions    Erythromycin     Penicillin G Other (See Comments)     Turned yellow    Penicillins Unknown    Sulfa (Sulfonamide Antibiotics) [Sulfa Antibiotics] Rash    Nexium [Esomeprazole] Palpitations     Felt like BP and HR increased/palitations.      Social History     Tobacco Use    Smoking status: Some Days     Current packs/day: 0.25     Types: Cigarettes    Smokeless tobacco: Never   Substance Use Topics    Alcohol use: Not Currently     Alcohol/week: 0.0 - 5.0 standard drinks of alcohol      Wt Readings from Last 1 Encounters:   25 94.3 kg (208 lb)        Anesthesia Evaluation            ROS/MED HX  ENT/Pulmonary:       Neurologic:       Cardiovascular:     (+)  hypertension- -   -  - -                          Irregular Heartbeat/Palpitations,            METS/Exercise Tolerance:     Hematologic:       Musculoskeletal:       GI/Hepatic:     (+) GERD,                   Renal/Genitourinary:       Endo:     (+)               Obesity,       Psychiatric/Substance Use:     (+) psychiatric history depression       Infectious Disease:       Malignancy:       Other:            Physical Exam    Airway        Mallampati: III  "  TM distance: > 3 FB   Neck ROM: full     Respiratory Devices and Support         Dental       (+) Minor Abnormalities - some fillings, tiny chips      Cardiovascular   cardiovascular exam normal          Pulmonary   pulmonary exam normal            OUTSIDE LABS:  CBC:   Lab Results   Component Value Date    WBC 9.3 12/31/2024    WBC 13.7 (H) 12/30/2024    HGB 11.9 12/31/2024    HGB 14.3 12/30/2024    HCT 36.1 12/31/2024    HCT 43.1 12/30/2024     12/31/2024     12/30/2024     BMP:   Lab Results   Component Value Date     12/31/2024     12/30/2024    POTASSIUM 3.7 12/31/2024    POTASSIUM 4.3 12/30/2024    CHLORIDE 100 12/31/2024    CHLORIDE 99 12/30/2024    CO2 25 12/31/2024    CO2 22 12/30/2024    BUN 11.4 12/31/2024    BUN 12.0 12/30/2024    CR 0.72 12/31/2024    CR 0.73 12/30/2024    GLC 89 12/31/2024     (H) 12/30/2024     COAGS: No results found for: \"PTT\", \"INR\", \"FIBR\"  POC: No results found for: \"BGM\", \"HCG\", \"HCGS\"  HEPATIC:   Lab Results   Component Value Date    ALBUMIN 3.7 12/30/2024    PROTTOTAL 8.0 12/30/2024    ALT  12/30/2024      Comment:      Unsatisfactory specimen - hemolyzed     AST  12/30/2024      Comment:      Unsatisfactory specimen - hemolyzed     ALKPHOS 110 12/30/2024    BILITOTAL 0.2 12/30/2024     OTHER:   Lab Results   Component Value Date    A1C 5.8 (H) 09/28/2021    TONY 9.4 12/31/2024    MAG 2.0 08/05/2024    LIPASE 31 12/30/2024    TSH 2.24 08/05/2024       Anesthesia Plan    ASA Status:  3    NPO Status:  NPO Appropriate    Anesthesia Type: MAC.              Consents    Anesthesia Plan(s) and associated risks, benefits, and realistic alternatives discussed. Questions answered and patient/representative(s) expressed understanding.     - Discussed: Risks, Benefits and Alternatives for BOTH SEDATION and the PROCEDURE were discussed     - Discussed with:  Patient      - Extended Intubation/Ventilatory Support Discussed: No.      - Patient is DNR/DNI " "Status: No     Use of blood products discussed: No .     Postoperative Care    Pain management: IV analgesics, Oral pain medications.   PONV prophylaxis: Ondansetron (or other 5HT-3), Dexamethasone or Solumedrol     Comments:             Lavelle England DO    I have reviewed the pertinent notes and labs in the chart from the past 30 days and (re)examined the patient.  Any updates or changes from those notes are reflected in this note.    Clinically Significant Risk Factors Present on Admission                   # Hypertension: Noted on problem list           # Obesity: Estimated body mass index is 38.04 kg/m  as calculated from the following:    Height as of this encounter: 1.575 m (5' 2\").    Weight as of this encounter: 94.3 kg (208 lb).                "

## 2025-02-19 NOTE — H&P
General Surgery H&P  iGa Sultana MRN# [id number]   Age/Sex: 59 year old female YOB: 1965     Reason for visit: Colonoscopy       Referring physician: Dr. Paula                    Assessment and Plan:   Assessment:  Diverticulitis     Plan:  -To the OR for colonoscopy  -Risk and benefits of procedure explained detail to the patient.  Risks include infection, bleeding, damage to the surrounding structures and possible perforation of the hollow organs.  Patient verbalized understanding provided consent to undergo the procedure above.          Chief Complaint:   Presenting for colonoscopy     History is obtained from the patient    HPI:   Gia Sultana is a 59 year old female who presents for colonoscopy.  Patient tolerated the prep well.  The patient's last colonoscopy was .  Family history shows no history of colon cancer.  Recent hx of diverticulitis. No new complaints.           Past Medical History:     Past Medical History:   Diagnosis Date    Arthritis     lower back and knees    Cholelithiasis     Depression     GERD (gastroesophageal reflux disease)     Obese               Past Surgical History:     Past Surgical History:   Procedure Laterality Date     SECTION      COLONOSCOPY      one polyp removed.    [facility]  DELIVERY ONLY      Description:  Section;  Recorded: 10/16/2008;  Comments: x2    [facility] LAP,CHOLECYSTECTOMY/EXPLORE      Description: Cholecystectomy Laparoscopic;  Recorded: 04/15/2008;    [facility] REVISN TRACHEA,CERVICAL      Description: Tracheoplasty Cervical;  Recorded: 10/16/2008;  Comments: Trach inserted as a child             Social History:    reports that she has been smoking cigarettes. She has never used smokeless tobacco. She reports that she does not currently use alcohol. She reports that she does not use drugs.           Family History:     Family History   Problem Relation Age of Onset    Diabetes Mother     Obesity Mother     Hypertension  Mother     Cerebrovascular Disease Mother     Dementia Father     Diabetes Brother     Obesity Brother     Diabetes Maternal Grandmother     Thyroid Cancer No family hx of               Allergies:     Allergies   Allergen Reactions    Erythromycin     Penicillin G Other (See Comments)     Turned yellow    Penicillins Unknown    Sulfa (Sulfonamide Antibiotics) [Sulfa Antibiotics] Rash    Nexium [Esomeprazole] Palpitations     Felt like BP and HR increased/palitations.              Medications:     Prior to Admission medications    Medication Sig Start Date End Date Taking? Authorizing Provider   cetirizine (ZYRTEC) 10 MG tablet Take 10 mg by mouth every morning.   Yes Reported, Patient   minoxidil (LONITEN) 2.5 MG tablet Take 1.25 mg by mouth every morning. 10/29/24  Yes Reported, Patient   omeprazole (PRILOSEC) 20 MG DR capsule TAKE 1 CAPSULE BY MOUTH EVERY DAY 1/31/25  Yes Mansi Mccracken MD   venlafaxine (EFFEXOR XR) 75 MG 24 hr capsule TAKE 1 CAPSULE BY MOUTH EVERY DAY 11/1/24  Yes Mansi Mccracken MD   acetaminophen (TYLENOL) 325 MG tablet Take 325-975 mg by mouth every 6 hours as needed for mild pain (max of 4,000 mg in 24 hours).    Reported, Patient   ciprofloxacin (CIPRO) 500 MG tablet Take 1 tablet (500 mg) by mouth 2 times daily. 1/9/25   Mansi Mccracken MD   magnesium oxide (MAG-OX) 400 MG tablet TAKE 1 TABLET BY MOUTH EVERY DAY 4/3/22   Mansi Mccracken MD   metroNIDAZOLE (FLAGYL) 500 MG tablet Take 1 tablet (500 mg) by mouth 3 times daily. 1/9/25   Mansi Mccracken MD   Semaglutide-Weight Management (WEGOVY) 2.4 MG/0.75ML pen Inject 2.4 mg subcutaneously once a week. 1/9/25   Miki Nicholas MD              Review of Systems:   A 12 point Review of Systems is negative other than noted in the HPI            Physical Exam:   No data found.     No intake or output data in the 24 hours ending 02/19/25 0653   Constitutional:   awake, alert, cooperative, no apparent distress, and appears stated age        Eyes:   PERRL, conjunctiva/corneas clear, EOM's intact; no scleral edema or icterus noted        ENT:   Normocephalic, without obvious abnormality, atraumatic, Lips, mucosa, and tongue normal        Hematologic / Lymphatic:   No lymphadenopathy       Lungs:   Normal respiratory effort, no accessory muscle use, breath sounds bilaterally on auscultation       Cardiovascular:   Regular rate and rhythm       Abdomen:   Soft, nondistended, nontender to palpation       Musculoskeletal:   No obvious swelling, bruising or deformity       Skin:   Skin color and texture normal for patient, no rashes or lesions              Data:          DO Flaco Blevins DO  General Surgeon  [facility]  Surgery [address] [address]?  Office: [phone]  Employed by - [facility]  Pager: [phone]

## 2025-02-19 NOTE — DISCHARGE INSTRUCTIONS
"If you have any questions or concerns regarding your procedure please contact Dr. Mendoza, his office number is [phone]  Learning About Colonoscopy  What is a colonoscopy?     A colonoscopy is a test (also called a procedure) that lets a doctor look inside your large intestine. The doctor uses a thin, lighted tube called a colonoscope. The doctor uses it to look for small growths called polyps, colon or rectal cancer (colorectal cancer), or other problems like bleeding.  During the procedure, the doctor can take samples of tissue. The samples can then be checked for cancer or other conditions. The doctor can also take out polyps.  How is a colonoscopy done?  This procedure is done in a doctor's office or a clinic or hospital. You will get medicine to help you relax and not feel pain. Some people find that they don't remember having the test because of the medicine.  The doctor gently moves the colonoscope, or scope, through the colon. The scope is also a small video camera. It lets the doctor see the colon and take pictures.  How do you prepare for the procedure?  You need to clean out your colon before the procedure so the doctor can see your colon. This depends on which \"colon prep\" your doctor recommends.  To clean out your colon, you'll do a \"colon prep\" before the test. This means you stop eating solid foods and drink only clear liquids. You can have water, tea, coffee, clear juices, clear broths, flavored ice pops, and gelatin (such as Jell-O). Do not drink anything red or purple.  The day or night before the procedure, you drink a large amount of a special liquid. This causes loose, frequent stools. You will go to the bathroom a lot. Your doctor may have you drink part of the liquid the evening before and the rest on the day of the test. It's very important to drink all of the liquid. If you have problems drinking it, call your doctor.  Arrange to have someone take you home after the test.  What can you " "expect after a colonoscopy?  Your doctor will tell you when you can eat and do your usual activities.  Drink a lot of fluid after the test to replace the fluids you may have lost during the colon prep. But don't drink alcohol.  Your doctor will talk to you about when you'll need your next colonoscopy. The results of your test and your risk for colorectal cancer will help your doctor decide how often you need to be checked.  After the test, you may be bloated or have gas pains. You may need to pass gas. If a biopsy was done or a polyp was removed, you may have streaks of blood in your stool (feces) for a few days. Check with your doctor to see when it is safe to take aspirin and nonsteroidal anti-inflammatory drugs (NSAIDs) again.  Problems such as heavy rectal bleeding may not occur until several weeks after the test. This isn't common. But it can happen after polyps are removed.  Follow-up care is a key part of your treatment and safety. Be sure to make and go to all appointments, and call your doctor if you are having problems. It's also a good idea to know your test results and keep a list of the medicines you take.  Where can you learn more?  Go to https://www.[employer].net/patiented  Enter Z368 in the search box to learn more about \"Learning About Colonoscopy.\"  Current as of: October 25, 2023  Content Version: 14.3    2024 [email or web address].   Care instructions adapted under license by your healthcare professional. If you have questions about a medical condition or this instruction, always ask your healthcare professional. [employer] disclaims any warranty or liability for your use of this information.    "

## 2025-02-19 NOTE — ANESTHESIA CARE TRANSFER NOTE
Patient: Gia Sultana    Procedure: Procedure(s):  COLONOSCOPY with polypectomy       Diagnosis: Colonic diverticular abscess [K57.20]  Diagnosis Additional Information: No value filed.    Anesthesia Type:   MAC     Note:    Oropharynx: oropharynx clear of all foreign objects  Level of Consciousness: drowsy  Oxygen Supplementation: face mask  Level of Supplemental Oxygen (L/min / FiO2): 8  Independent Airway: airway patency satisfactory and stable  Dentition: dentition unchanged  Vital Signs Stable: post-procedure vital signs reviewed and stable  Report to RN Given: handoff report given  Patient transferred to: Phase II    Handoff Report: Identifed the Patient, Identified the Reponsible Provider, Reviewed the pertinent medical history, Discussed the surgical course, Reviewed Intra-OP anesthesia mangement and issues during anesthesia, Set expectations for post-procedure period and Allowed opportunity for questions and acknowledgement of understanding      Vitals:  Vitals Value Taken Time   /87 02/19/25 1213   Temp 97.5  F (36.4  C) 02/19/25 1212   Pulse 99 02/19/25 1213   Resp 18 02/19/25 1212   SpO2 96 % 02/19/25 1213   Vitals shown include unfiled device data.    Electronically Signed By: KAREEM Lindsey CRNA  February 19, 2025  12:14 PM

## 2025-02-27 ENCOUNTER — TELEPHONE (OUTPATIENT)
Dept: SURGERY | Facility: CLINIC | Age: 60
End: 2025-02-27
Payer: COMMERCIAL

## 2025-02-27 NOTE — TELEPHONE ENCOUNTER
Called patient to discuss details of upcoming surgery with Dr Mendoza.  She did not answer. I left  requesting she call back when able.  She is currently scheduled for procedure with Dr Mendoza on 5/22/25 at [facility].  I will send confirmation letter to patient via [employer] and attempt to reach her again tomorrow if she does not call back.

## 2025-02-27 NOTE — LETTER
Pre-op Physical: Schedule a pre-op physical with your primary care doctor.  The pre-op physical must be 10-30 days before surgery and since it is required by anesthesia, your surgery will be cancelled if it's not done.    Surgery Date: 5/22/2025     Location: [place] [address]    Approximate Arrival Time: 6:00 am  (Unless instructed differently by the pre-op call nurse)     Post op Appointment: 6/6/2025 at  1:45 pm with  Dr Mendoza  [facility] [address] [place] [address] [address] [address].    Pre-Surgical Tasks:     Review all medications with your primary care or prescribing physician; they will advise you which meds to stop and when, and when you can resume taking.  Certain medications like blood thinners and weight loss medications need to be stopped in advance of surgery to proceed safely.      Blood thinners including but not exclusive to drugs like Xarelto, Eliquis, Warfarin and Aspirin, should be stopped five days before surgery, if your prescribing provider agrees. Follow your provider's advice on stopping blood thinners because they know you best.  If you are unsure if your medication is a blood thinner, ask your prescribing provider.    Weight loss medications: There are multiple medications being used for weight management and diabetes today, and the list is growing.  Phentermine, Ozempic, Wegovy, Trulicity, and other similar medications need to be stopped one week before surgery to avoid being cancelled.  Victoza and Saxenda can be continued longer but must be stopped one full day before surgery.  Please ask your prescribing provider for advice.    Diabetic medications: in addition to the medications talked about above that are used for either weight loss or diabetes, some people are on insulin that may require adjustment.  Please discuss managing diabetic medications with your prescribing doctor as  these medications may require modification prior to surgery.     Please shower the evening before and morning of surgery with Hibiclens soap.  Any [facility] can provide this to you at no cost, or it can be found at your local pharmacy.     Fasting instructions will be provided by the pre-op nurse who will call you 1-3 days before surgery.  Typically, we advise normal food up to 8 hours before you arrive for surgery. Clear liquids only from then until 2 hours before you arrive surgery, then nothing at all by mouth.  The nurse will review your specific instructions with you at the call.      Smoking impacts your body's ability to heal properly so we advise patients to quit if possible before surgery.  Plastic Surgery patients are required to be nicotine free for at least 8 weeks before surgery.      You will need an adult to drive you home and stay with you 24 hours after surgery. Public transportation or Medical Van Services are not permitted.    Visitor restrictions are subject to change, please verify with the pre-op nurse when they call how many people are permitted to accompany you.    We always encourage you to notify your insurance any time you have medical tests or procedures scheduled including surgery. The number is usually right on the back of your insurance card. To obtain pricing for surgery, please call [facility] Cost of Care at [phone] or email [email or web address].        Call our office if you have any questions! Thank you!     Jose Daniels  Complex   [facility]  [phone]    Electronically signed              [facility] Colorectal Bowel Surgery Preparation Guide    You are scheduled for major abdominal surgery. In order for us to safely proceed with your operation, please follow the instructions below.    Stop Smoking: Smokers have twice the risk of infection after surgery. Please do not smoke 4-6  weeks before your operation, and stay smoke-free at least 4 weeks after your operation to decrease your risk of infection.    Diet: Start eating a low fiber diet 2 days before your surgery and continue this diet until you begin your bowel prep. Once you start your bowel prep: 1) avoid all alcohol, 2) you can have clear liquids until 2 hours before your scheduled surgery time.     Low Fiber Diet Examples: white bread, white rice, pasta, crackers, fish, chicken,  eggs, ground beef, creamy peanut butter, cooked or boiled vegetables, bananas,  melons, milk, plan yogurt, cheese.     Clear Liquids --> Liquids you can see through when held up to the light with no floating particles. Examples: soup broth, Jell-O, black co?ee, tea, Powerade/  Gatorade, apple juice.    Bathing: Purchase an antimicrobial soap or antiseptic agent (Hibiclens or Exidine) at your local pharmacy and use for either a full body bath or full body shower the evening before surgery and the morning of surgery. Do not shave or clip the area of your body where you are having surgery.    Day Before Surgery:    Bowel Prep     Purchase one 8-ounce container of Miralax and 64 ounces of Gatorade or Powerade at your drug store or pharmacy. Mix both in a 2-quart container and place in refrigerator to chill. Also purchase four (4) Dulcolax tablets, 5 mg each.      your antibiotic prescriptions from your pharmacy     9:00 a.m. --> drink 8 ounces of clear liquid     10:00 a.m. --> Take 2 Dulcolax tablets     11:00 a.m. --> Drink 8 ounces of clear liquid     1:00 p.m. --> take 2 Neomycin 500 mg tablets and 2 metronidazole 500 mg tablets     2:00 p.m. --> take 2 Neomycin 500 mg tablets and 2 metronidazole 500 mg tablets, also take 2 Dulcolax tablets    (If allergy to metronidazole, Erythromycin 1 gm will be used instead)     3:00 p.m. --> Drink 8 ounces of clear liquid     6:00 p.m. --> Drink 8 ounces of prepared Miralax prep every 15 minutes until it is gone.  Try to complete over a 2-hour period     11:00 p.m. --> take 2 Neomycin 500 mg tablets and 2 metronidazole 500 mg tablets    Morning of Surgery:     You can drink clear liquids, including Gatorade or Powerade, 2 hours before your    scheduled surgical time. Otherwise, nothing else to eat or drink 8 hours before    your scheduled surgery time.    If you have any questions, please don t hesitate to call us at [phone]

## 2025-03-03 ENCOUNTER — TELEPHONE (OUTPATIENT)
Dept: SURGERY | Facility: CLINIC | Age: 60
End: 2025-03-03
Payer: COMMERCIAL

## 2025-03-03 DIAGNOSIS — Z93.3 COLOSTOMY IN PLACE (H): Primary | ICD-10-CM

## 2025-03-05 PROBLEM — D12.6 COLON ADENOMAS: Status: ACTIVE | Noted: 2025-03-05

## 2025-05-01 ENCOUNTER — OFFICE VISIT (OUTPATIENT)
Dept: FAMILY MEDICINE | Facility: CLINIC | Age: 60
End: 2025-05-01
Payer: COMMERCIAL

## 2025-05-01 VITALS
WEIGHT: 214 LBS | DIASTOLIC BLOOD PRESSURE: 80 MMHG | TEMPERATURE: 97.9 F | HEART RATE: 87 BPM | BODY MASS INDEX: 39.38 KG/M2 | RESPIRATION RATE: 20 BRPM | OXYGEN SATURATION: 97 % | SYSTOLIC BLOOD PRESSURE: 128 MMHG | HEIGHT: 62 IN

## 2025-05-01 DIAGNOSIS — E66.01 CLASS 2 SEVERE OBESITY DUE TO EXCESS CALORIES WITH SERIOUS COMORBIDITY AND BODY MASS INDEX (BMI) OF 39.0 TO 39.9 IN ADULT (H): ICD-10-CM

## 2025-05-01 DIAGNOSIS — E66.812 CLASS 2 SEVERE OBESITY DUE TO EXCESS CALORIES WITH SERIOUS COMORBIDITY AND BODY MASS INDEX (BMI) OF 39.0 TO 39.9 IN ADULT (H): ICD-10-CM

## 2025-05-01 DIAGNOSIS — Z01.818 PREOP GENERAL PHYSICAL EXAM: Primary | ICD-10-CM

## 2025-05-01 DIAGNOSIS — I10 ESSENTIAL HYPERTENSION: ICD-10-CM

## 2025-05-01 DIAGNOSIS — E78.5 DYSLIPIDEMIA: ICD-10-CM

## 2025-05-01 DIAGNOSIS — K57.30 DIVERTICULAR DISEASE OF LARGE INTESTINE: ICD-10-CM

## 2025-05-01 PROBLEM — K57.20 COLONIC DIVERTICULAR ABSCESS: Status: RESOLVED | Noted: 2024-12-30 | Resolved: 2025-05-01

## 2025-05-01 LAB
ANION GAP SERPL CALCULATED.3IONS-SCNC: 11 MMOL/L (ref 7–15)
BUN SERPL-MCNC: 10.8 MG/DL (ref 8–23)
CALCIUM SERPL-MCNC: 10.2 MG/DL (ref 8.8–10.4)
CHLORIDE SERPL-SCNC: 100 MMOL/L (ref 98–107)
CREAT SERPL-MCNC: 0.92 MG/DL (ref 0.51–0.95)
EGFRCR SERPLBLD CKD-EPI 2021: 71 ML/MIN/1.73M2
GLUCOSE SERPL-MCNC: 93 MG/DL (ref 70–99)
HCO3 SERPL-SCNC: 28 MMOL/L (ref 22–29)
HGB BLD-MCNC: 14.6 G/DL (ref 11.7–15.7)
POTASSIUM SERPL-SCNC: 5 MMOL/L (ref 3.4–5.3)
SODIUM SERPL-SCNC: 139 MMOL/L (ref 135–145)

## 2025-05-01 ASSESSMENT — PAIN SCALES - GENERAL: PAINLEVEL_OUTOF10: NO PAIN (0)

## 2025-05-01 NOTE — PATIENT INSTRUCTIONS
Stop Wegovy at least 1 week prior to surgery.  May resume Wegovy when okay with your care team, but I recommend waiting until your bowels to begin moving again after surgery.    Do not take any vitamins, supplements, ibuprofen, naproxen, or aspirin 1 week prior to surgery.  Acetaminophen is okay.    Try Replens over-the-counter vaginal moisturizer, following instructions on package.  If this is not adequate in managing your symptoms, schedule a follow-up visit with me for discussion of vaginal estrogen.

## 2025-05-01 NOTE — PROGRESS NOTES
Preoperative Evaluation  [facility]  [address]   [facility] [address]  Phone: [phone]  Fax: [phone]  Primary Provider: Mansi Mccracken MD  Pre-op Performing Provider: Mansi Mccracken MD  May 1, 2025             4/26/2025   Surgical Information   What procedure is being done? Colon partial removal   Facility or Hospital where procedure/surgery will be performed:  [facility]   Who is doing the procedure / surgery? Dr. Mendoza   Date of surgery / procedure: May 22   Time of surgery / procedure: 6   Where do you plan to recover after surgery? at home with family     Fax number for surgical facility: Note does not need to be faxed, will be available electronically in Epic.    Assessment & Plan     The proposed surgical procedure is considered INTERMEDIATE risk.    Preop general physical exam  Surgical risks include controlled hypertension without need for medications, dyslipidemia managed through lifestyle, obesity with BMI of 39.  At this time her health conditions are adequately managed.  She may proceed with surgery as scheduled without further clinical clarification or evaluation and may proceed with choice of anesthesia.  We will check hemoglobin and basic metabolic panel today.  - Basic metabolic panel; Future  - Hemoglobin; Future    Diverticular disease of large intestine  To proceed with surgical partial colectomy as scheduled.    Hypertension  Stable and controlled without need for medication.    Dyslipidemia  Managed through healthy lifestyle.    Class 2 severe obesity due to excess calories with serious comorbidity and body mass index (BMI) of 39.0 to 39.9 in adult (H)  Managed with Wegovy, stable, will hold Wegovy at least 1 week prior to surgery.      Possible Sleep Apnea: No prior assessment          Risks and Recommendations  The patient has the following additional risks and recommendations for perioperative complications:   - No identified additional risk  factors other than previously addressed    Antiplatelet or Anticoagulation Medication Instructions   - We reviewed the medication list and the patient is not on an antiplatelet or anticoagulation medications.    Additional Medication Instructions  Take all scheduled medications on the day of surgery EXCEPT for modifications listed below:   - GLP-1 Injectable (exenitide, liraglutide, semaglutide, dulaglutide, etc.): DO NOT TAKE 7 days before surgery     Recommendation  Approval given to proceed with proposed procedure, without further diagnostic evaluation.        Alfonso Nielsen is a 59 year old, presenting for the following:  Recheck Medication and Pre-Op Exam    Diverticulitis with perforation requiring drain placement in December.  Extensive diverticulitis noted in follow-up colonoscopy.  Requiring surgical resection due to severity of prior episode of diverticulitis.  Prior history of hypertension, managed without medications.  History of dyslipidemia managed through healthy lifestyle.  Obesity is currently stable, improved from previous, managing with Wegovy.  Anxiety stable on venlafaxine.  GERD stable on omeprazole.      5/1/2025     7:19 AM   Additional Questions   Roomed by am [facility]     HPI:           4/26/2025   Pre-Op Questionnaire   Have you ever had a heart attack or stroke? No   Have you ever had surgery on your heart or blood vessels, such as a stent placement, a coronary artery bypass, or surgery on an artery in your head, neck, heart, or legs? No   Do you have chest pain with activity? No   Do you have a history of heart failure? No   Do you currently have a cold, bronchitis or symptoms of other infection? No   Do you have a cough, shortness of breath, or wheezing? No   Do you or anyone in your family have previous history of blood clots? No   Do you or does anyone in your family have a serious bleeding problem such as prolonged bleeding following surgeries or cuts? No   Have you ever had problems  with anemia or been told to take iron pills? No   Have you had any abnormal blood loss such as black, tarry or bloody stools, or abnormal vaginal bleeding? No   Have you ever had a blood transfusion? No   Are you willing to have a blood transfusion if it is medically needed before, during, or after your surgery? Yes   Have you or any of your relatives ever had problems with anesthesia? No   Do you have sleep apnea, excessive snoring or daytime drowsiness? (!) YES -- snoring   Do you have a CPAP machine? (!) NO, no prior evaluation   Do you have any artifical heart valves or other implanted medical devices like a pacemaker, defibrillator, or continuous glucose monitor? No   Do you have artificial joints? No   Are you allergic to latex? No     Advance Care Planning    Patient states has Health Care Directive and will send to [employer] [employer].    Preoperative Review of    reviewed - no record of controlled substances prescribed. Prior Rx for gabapentin, no longer taking.          Patient Active Problem List    Diagnosis Date Noted    Colon adenomas 03/05/2025     Priority: Medium    Colonic diverticular abscess 12/30/2024     Priority: Medium    Benign neoplasm of ascending colon 10/24/2024     Priority: Medium    Diverticular disease of large intestine 10/22/2024     Priority: Medium    Gastroesophageal reflux disease without esophagitis 10/17/2023     Priority: Medium    Class 2 severe obesity due to excess calories with serious comorbidity and body mass index (BMI) of 39.0 to 39.9 in adult (H) 09/28/2021     Priority: Medium    Dyslipidemia      Priority: Medium    Hepatic cyst 03/07/2018     Priority: Medium     F/U CT scan due 9/2018        Renal cyst 03/07/2018     Priority: Medium     Mulitple, bilateral, 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        Generalized anxiety disorder      Priority: Medium    Female stress incontinence      Priority: Medium    Allergies      Priority: Medium     Created by Conversion          Past Medical History:   Diagnosis Date    Arthritis     lower back and knees    Cholelithiasis     Depression     GERD (gastroesophageal reflux disease)     Obese      Past Surgical History:   Procedure Laterality Date     SECTION      COLONOSCOPY      one polyp removed.    COLONOSCOPY N/A 2025    Procedure: COLONOSCOPY with polypectomy;  Surgeon: Flaco Mendoza DO;  Location: [facility] OR    [facility]  DELIVERY ONLY      Description:  Section;  Recorded: 10/16/2008;  Comments: x2    [facility] LAP,CHOLECYSTECTOMY/EXPLORE      Description: Cholecystectomy Laparoscopic;  Recorded: 04/15/2008;    [facility] REVISN TRACHEA,CERVICAL      Description: Tracheoplasty Cervical;  Recorded: 10/16/2008;  Comments: Trach inserted as a child     Current Outpatient Medications   Medication Sig Dispense Refill    acetaminophen (TYLENOL) 325 MG tablet Take 325-975 mg by mouth every 6 hours as needed for mild pain (max of 4,000 mg in 24 hours).      cetirizine (ZYRTEC) 10 MG tablet Take 10 mg by mouth every morning.      magnesium oxide (MAG-OX) 400 MG tablet TAKE 1 TABLET BY MOUTH EVERY DAY 90 tablet 5    minoxidil (LONITEN) 2.5 MG tablet Take 1.25 mg by mouth every morning.      omeprazole (PRILOSEC) 20 MG DR capsule TAKE 1 CAPSULE BY MOUTH EVERY DAY 90 capsule 2    Semaglutide-Weight Management (WEGOVY) 2.4 MG/0.75ML pen Inject 2.4 mg subcutaneously once a week. 3 mL 5    venlafaxine (EFFEXOR XR) 75 MG 24 hr capsule TAKE 1 CAPSULE BY MOUTH EVERY DAY 90 capsule 2    ciprofloxacin (CIPRO) 500 MG tablet Take 1 tablet (500 mg) by mouth 2 times daily. (Patient not taking: Reported on 2025) 20 tablet 0    metroNIDAZOLE (FLAGYL) 500 MG tablet Take 1 tablet (500 mg) by mouth 3 times daily. (Patient not taking:  "Reported on 5/1/2025) 30 tablet 0       Allergies   Allergen Reactions    Erythromycin     Penicillin G Other (See Comments)     Turned yellow    Penicillins Unknown    Sulfa (Sulfonamide Antibiotics) [Sulfa Antibiotics] Rash    Nexium [Esomeprazole] Palpitations     Felt like BP and HR increased/palitations.        Social History     Tobacco Use    Smoking status: Some Days     Current packs/day: 0.25     Types: Cigarettes    Smokeless tobacco: Never   Substance Use Topics    Alcohol use: Not Currently     Alcohol/week: 0.0 - 5.0 standard drinks of alcohol     Family History   Problem Relation Age of Onset    Diabetes Mother     Obesity Mother     Hypertension Mother     Cerebrovascular Disease Mother     Dementia Father     Diabetes Brother     Obesity Brother     Diabetes Maternal Grandmother     Thyroid Cancer No family hx of     Anesthesia Reaction No family hx of      History   Drug Use No             Review of Systems  Constitutional, neuro, ENT, endocrine, pulmonary, cardiac, gastrointestinal, genitourinary, musculoskeletal, integument and psychiatric systems are negative, except as otherwise noted.    Objective    /80   Pulse 87   Temp 97.9  F (36.6  C) (Oral)   Resp 20   Ht 1.575 m (5' 2\")   Wt 97.1 kg (214 lb)   SpO2 97%   BMI 39.14 kg/m     Estimated body mass index is 39.14 kg/m  as calculated from the following:    Height as of this encounter: 1.575 m (5' 2\").    Weight as of this encounter: 97.1 kg (214 lb).  Physical Exam  GENERAL: alert and no distress  EYES: Eyes grossly normal to inspection, PERRL and conjunctivae and sclerae normal  HENT: ear canals and TM's normal, nose and mouth without ulcers or lesions  NECK: no adenopathy, no asymmetry, masses, or scars  RESP: lungs clear to auscultation - no rales, rhonchi or wheezes  CV: regular rate and rhythm, normal S1 S2, no S3 or S4, no murmur, click or rub, no peripheral edema  ABDOMEN: soft, nontender, no hepatosplenomegaly, no masses " and bowel sounds normal  MS: no gross musculoskeletal defects noted, no edema  SKIN: no suspicious lesions or rashes  NEURO: Normal strength and tone, mentation intact and speech normal  PSYCH: mentation appears normal, affect normal/bright    Recent Labs   Lab Test 12/31/24  0633 12/30/24  1205 12/30/24  1147   HGB 11.9 14.3  --     439  --      --  135   POTASSIUM 3.7 4.3 5.9*   CR 0.72  --  0.73        Diagnostics  Labs pending at this time.  Results will be reviewed when available.   No EKG required, no history of coronary heart disease, significant arrhythmia, peripheral arterial disease or other structural heart disease.    Revised Cardiac Risk Index (RCRI)  The patient has the following serious cardiovascular risks for perioperative complications:   - No serious cardiac risks = 0 points     RCRI Interpretation: 0 points: Class I (very low risk - 0.4% complication rate)         Signed Electronically by: Mansi Mccracken MD  A copy of this evaluation report is provided to the requesting physician.

## 2025-05-07 ENCOUNTER — RESULTS FOLLOW-UP (OUTPATIENT)
Dept: FAMILY MEDICINE | Facility: CLINIC | Age: 60
End: 2025-05-07

## 2025-05-22 ENCOUNTER — DOCUMENTATION ONLY (OUTPATIENT)
Dept: OTHER | Facility: CLINIC | Age: 60
End: 2025-05-22

## 2025-05-22 ENCOUNTER — HOSPITAL ENCOUNTER (INPATIENT)
Facility: HOSPITAL | Age: 60
End: 2025-05-22
Attending: SURGERY | Admitting: SURGERY
Payer: COMMERCIAL

## 2025-05-22 ENCOUNTER — ANESTHESIA EVENT (OUTPATIENT)
Dept: SURGERY | Facility: HOSPITAL | Age: 60
End: 2025-05-22
Payer: COMMERCIAL

## 2025-05-22 ENCOUNTER — ANESTHESIA (OUTPATIENT)
Dept: SURGERY | Facility: HOSPITAL | Age: 60
End: 2025-05-22
Payer: COMMERCIAL

## 2025-05-22 VITALS
TEMPERATURE: 98.6 F | SYSTOLIC BLOOD PRESSURE: 130 MMHG | WEIGHT: 208 LBS | OXYGEN SATURATION: 96 % | DIASTOLIC BLOOD PRESSURE: 69 MMHG | RESPIRATION RATE: 19 BRPM | BODY MASS INDEX: 38.28 KG/M2 | HEART RATE: 92 BPM | HEIGHT: 62 IN

## 2025-05-22 DIAGNOSIS — K57.30 DIVERTICULAR DISEASE OF LARGE INTESTINE: Primary | ICD-10-CM

## 2025-05-22 PROBLEM — K57.32 DIVERTICULITIS LARGE INTESTINE: Status: ACTIVE | Noted: 2025-05-22

## 2025-05-22 LAB — GLUCOSE BLDC GLUCOMTR-MCNC: 155 MG/DL (ref 70–99)

## 2025-05-22 PROCEDURE — 999N000141 HC STATISTIC PRE-PROCEDURE NURSING ASSESSMENT: Performed by: SURGERY

## 2025-05-22 PROCEDURE — 44213 LAP MOBIL SPLENIC FL ADD-ON: CPT | Performed by: SURGERY

## 2025-05-22 PROCEDURE — 0T9B70Z DRAINAGE OF BLADDER WITH DRAINAGE DEVICE, VIA NATURAL OR ARTIFICIAL OPENING: ICD-10-PCS | Performed by: SURGERY

## 2025-05-22 PROCEDURE — 258N000003 HC RX IP 258 OP 636: Performed by: ANESTHESIOLOGY

## 2025-05-22 PROCEDURE — 258N000003 HC RX IP 258 OP 636: Performed by: SURGERY

## 2025-05-22 PROCEDURE — 0T784DZ DILATION OF BILATERAL URETERS WITH INTRALUMINAL DEVICE, PERCUTANEOUS ENDOSCOPIC APPROACH: ICD-10-PCS | Performed by: SURGERY

## 2025-05-22 PROCEDURE — 52005 CYSTO W/URTRL CATHJ: CPT | Performed by: STUDENT IN AN ORGANIZED HEALTH CARE EDUCATION/TRAINING PROGRAM

## 2025-05-22 PROCEDURE — 44207 L COLECTOMY/COLOPROCTOSTOMY: CPT | Performed by: SURGERY

## 2025-05-22 PROCEDURE — 250N000011 HC RX IP 250 OP 636: Performed by: SURGERY

## 2025-05-22 PROCEDURE — 120N000001 HC R&B MED SURG/OB

## 2025-05-22 PROCEDURE — 250N000011 HC RX IP 250 OP 636: Mod: JZ | Performed by: ANESTHESIOLOGY

## 2025-05-22 PROCEDURE — 258N000001 HC RX 258: Performed by: SURGERY

## 2025-05-22 PROCEDURE — 370N000017 HC ANESTHESIA TECHNICAL FEE, PER MIN: Performed by: SURGERY

## 2025-05-22 PROCEDURE — 710N000009 HC RECOVERY PHASE 1, LEVEL 1, PER MIN: Performed by: SURGERY

## 2025-05-22 PROCEDURE — 44207 L COLECTOMY/COLOPROCTOSTOMY: CPT | Mod: 80 | Performed by: SURGERY

## 2025-05-22 PROCEDURE — 360N000080 HC SURGERY LEVEL 7, PER MIN: Performed by: SURGERY

## 2025-05-22 PROCEDURE — 88307 TISSUE EXAM BY PATHOLOGIST: CPT | Mod: TC | Performed by: SURGERY

## 2025-05-22 PROCEDURE — 250N000025 HC SEVOFLURANE, PER MIN: Performed by: SURGERY

## 2025-05-22 PROCEDURE — C1769 GUIDE WIRE: HCPCS | Performed by: SURGERY

## 2025-05-22 PROCEDURE — 0T9B70Z DRAINAGE OF BLADDER WITH DRAINAGE DEVICE, VIA NATURAL OR ARTIFICIAL OPENING: ICD-10-PCS | Performed by: STUDENT IN AN ORGANIZED HEALTH CARE EDUCATION/TRAINING PROGRAM

## 2025-05-22 PROCEDURE — 44213 LAP MOBIL SPLENIC FL ADD-ON: CPT | Mod: 80 | Performed by: SURGERY

## 2025-05-22 PROCEDURE — 250N000009 HC RX 250: Performed by: STUDENT IN AN ORGANIZED HEALTH CARE EDUCATION/TRAINING PROGRAM

## 2025-05-22 PROCEDURE — 0DTN4ZZ RESECTION OF SIGMOID COLON, PERCUTANEOUS ENDOSCOPIC APPROACH: ICD-10-PCS | Performed by: SURGERY

## 2025-05-22 PROCEDURE — 88307 TISSUE EXAM BY PATHOLOGIST: CPT | Mod: 26 | Performed by: PATHOLOGY

## 2025-05-22 PROCEDURE — 8E0W4CZ ROBOTIC ASSISTED PROCEDURE OF TRUNK REGION, PERCUTANEOUS ENDOSCOPIC APPROACH: ICD-10-PCS | Performed by: SURGERY

## 2025-05-22 PROCEDURE — 250N000013 HC RX MED GY IP 250 OP 250 PS 637: Performed by: SURGERY

## 2025-05-22 PROCEDURE — 250N000009 HC RX 250: Performed by: ANESTHESIOLOGY

## 2025-05-22 PROCEDURE — 272N000001 HC OR GENERAL SUPPLY STERILE: Performed by: SURGERY

## 2025-05-22 PROCEDURE — 99223 1ST HOSP IP/OBS HIGH 75: CPT | Mod: AI | Performed by: STUDENT IN AN ORGANIZED HEALTH CARE EDUCATION/TRAINING PROGRAM

## 2025-05-22 RX ORDER — KETOROLAC TROMETHAMINE 30 MG/ML
15 INJECTION, SOLUTION INTRAMUSCULAR; INTRAVENOUS EVERY 8 HOURS PRN
Status: DISCONTINUED | OUTPATIENT
Start: 2025-05-22 | End: 2025-05-22 | Stop reason: HOSPADM

## 2025-05-22 RX ORDER — OXYCODONE HYDROCHLORIDE 5 MG/1
5 TABLET ORAL
Status: DISCONTINUED | OUTPATIENT
Start: 2025-05-22 | End: 2025-05-22 | Stop reason: HOSPADM

## 2025-05-22 RX ORDER — NALOXONE HYDROCHLORIDE 0.4 MG/ML
0.1 INJECTION, SOLUTION INTRAMUSCULAR; INTRAVENOUS; SUBCUTANEOUS
Status: DISCONTINUED | OUTPATIENT
Start: 2025-05-22 | End: 2025-05-22 | Stop reason: HOSPADM

## 2025-05-22 RX ORDER — NALOXONE HYDROCHLORIDE 1 MG/ML
0.2 INJECTION INTRAMUSCULAR; INTRAVENOUS; SUBCUTANEOUS
Status: DISCONTINUED | OUTPATIENT
Start: 2025-05-22 | End: 2025-05-26 | Stop reason: HOSPADM

## 2025-05-22 RX ORDER — ONDANSETRON 4 MG/1
4 TABLET, ORALLY DISINTEGRATING ORAL EVERY 6 HOURS PRN
Status: DISCONTINUED | OUTPATIENT
Start: 2025-05-22 | End: 2025-05-26 | Stop reason: HOSPADM

## 2025-05-22 RX ORDER — HYDROMORPHONE HCL IN WATER/PF 6 MG/30 ML
0.2 PATIENT CONTROLLED ANALGESIA SYRINGE INTRAVENOUS
Status: DISCONTINUED | OUTPATIENT
Start: 2025-05-22 | End: 2025-05-26 | Stop reason: HOSPADM

## 2025-05-22 RX ORDER — AMOXICILLIN 250 MG
1 CAPSULE ORAL 2 TIMES DAILY
Status: DISCONTINUED | OUTPATIENT
Start: 2025-05-22 | End: 2025-05-26 | Stop reason: HOSPADM

## 2025-05-22 RX ORDER — LIDOCAINE 40 MG/G
CREAM TOPICAL
Status: DISCONTINUED | OUTPATIENT
Start: 2025-05-22 | End: 2025-05-26 | Stop reason: HOSPADM

## 2025-05-22 RX ORDER — POLYETHYLENE GLYCOL 3350 17 G/17G
17 POWDER, FOR SOLUTION ORAL DAILY
Status: DISCONTINUED | OUTPATIENT
Start: 2025-05-23 | End: 2025-05-26 | Stop reason: HOSPADM

## 2025-05-22 RX ORDER — ACETAMINOPHEN 325 MG/1
975 TABLET ORAL EVERY 8 HOURS
Status: DISCONTINUED | OUTPATIENT
Start: 2025-05-22 | End: 2025-05-26 | Stop reason: HOSPADM

## 2025-05-22 RX ORDER — LIDOCAINE HYDROCHLORIDE AND EPINEPHRINE 10; 10 MG/ML; UG/ML
INJECTION, SOLUTION INFILTRATION; PERINEURAL PRN
Status: DISCONTINUED | OUTPATIENT
Start: 2025-05-22 | End: 2025-05-22 | Stop reason: HOSPADM

## 2025-05-22 RX ORDER — DEXAMETHASONE SODIUM PHOSPHATE 4 MG/ML
4 INJECTION, SOLUTION INTRA-ARTICULAR; INTRALESIONAL; INTRAMUSCULAR; INTRAVENOUS; SOFT TISSUE
Status: DISCONTINUED | OUTPATIENT
Start: 2025-05-22 | End: 2025-05-22 | Stop reason: HOSPADM

## 2025-05-22 RX ORDER — LIDOCAINE HYDROCHLORIDE 10 MG/ML
INJECTION, SOLUTION INFILTRATION; PERINEURAL PRN
Status: DISCONTINUED | OUTPATIENT
Start: 2025-05-22 | End: 2025-05-22

## 2025-05-22 RX ORDER — CETIRIZINE HYDROCHLORIDE 10 MG/1
10 TABLET ORAL DAILY
Status: DISCONTINUED | OUTPATIENT
Start: 2025-05-23 | End: 2025-05-26 | Stop reason: HOSPADM

## 2025-05-22 RX ORDER — ONDANSETRON 4 MG/1
4 TABLET, ORALLY DISINTEGRATING ORAL EVERY 30 MIN PRN
Status: DISCONTINUED | OUTPATIENT
Start: 2025-05-22 | End: 2025-05-22 | Stop reason: HOSPADM

## 2025-05-22 RX ORDER — FENTANYL CITRATE 50 UG/ML
INJECTION, SOLUTION INTRAMUSCULAR; INTRAVENOUS PRN
Status: DISCONTINUED | OUTPATIENT
Start: 2025-05-22 | End: 2025-05-22

## 2025-05-22 RX ORDER — SODIUM CHLORIDE 9 MG/ML
INJECTION, SOLUTION INTRAVENOUS CONTINUOUS
Status: DISCONTINUED | OUTPATIENT
Start: 2025-05-22 | End: 2025-05-23

## 2025-05-22 RX ORDER — NALOXONE HYDROCHLORIDE 1 MG/ML
0.4 INJECTION INTRAMUSCULAR; INTRAVENOUS; SUBCUTANEOUS
Status: DISCONTINUED | OUTPATIENT
Start: 2025-05-22 | End: 2025-05-26 | Stop reason: HOSPADM

## 2025-05-22 RX ORDER — HYDROMORPHONE HCL IN WATER/PF 6 MG/30 ML
0.4 PATIENT CONTROLLED ANALGESIA SYRINGE INTRAVENOUS EVERY 5 MIN PRN
Status: DISCONTINUED | OUTPATIENT
Start: 2025-05-22 | End: 2025-05-22 | Stop reason: HOSPADM

## 2025-05-22 RX ORDER — PROPOFOL 10 MG/ML
INJECTION, EMULSION INTRAVENOUS PRN
Status: DISCONTINUED | OUTPATIENT
Start: 2025-05-22 | End: 2025-05-22

## 2025-05-22 RX ORDER — FENTANYL CITRATE 50 UG/ML
25 INJECTION, SOLUTION INTRAMUSCULAR; INTRAVENOUS EVERY 5 MIN PRN
Status: DISCONTINUED | OUTPATIENT
Start: 2025-05-22 | End: 2025-05-22 | Stop reason: HOSPADM

## 2025-05-22 RX ORDER — NALOXONE HYDROCHLORIDE 1 MG/ML
0.1 INJECTION INTRAMUSCULAR; INTRAVENOUS; SUBCUTANEOUS
Status: DISCONTINUED | OUTPATIENT
Start: 2025-05-22 | End: 2025-05-22 | Stop reason: HOSPADM

## 2025-05-22 RX ORDER — ONDANSETRON 2 MG/ML
4 INJECTION INTRAMUSCULAR; INTRAVENOUS EVERY 30 MIN PRN
Status: DISCONTINUED | OUTPATIENT
Start: 2025-05-22 | End: 2025-05-22 | Stop reason: HOSPADM

## 2025-05-22 RX ORDER — FENTANYL CITRATE 50 UG/ML
50 INJECTION, SOLUTION INTRAMUSCULAR; INTRAVENOUS EVERY 5 MIN PRN
Status: DISCONTINUED | OUTPATIENT
Start: 2025-05-22 | End: 2025-05-22 | Stop reason: HOSPADM

## 2025-05-22 RX ORDER — HYDROMORPHONE HCL IN WATER/PF 6 MG/30 ML
0.2 PATIENT CONTROLLED ANALGESIA SYRINGE INTRAVENOUS EVERY 5 MIN PRN
Status: DISCONTINUED | OUTPATIENT
Start: 2025-05-22 | End: 2025-05-22 | Stop reason: HOSPADM

## 2025-05-22 RX ORDER — CEFAZOLIN SODIUM/WATER 2 G/20 ML
2 SYRINGE (ML) INTRAVENOUS SEE ADMIN INSTRUCTIONS
Status: DISCONTINUED | OUTPATIENT
Start: 2025-05-22 | End: 2025-05-22 | Stop reason: HOSPADM

## 2025-05-22 RX ORDER — DEXAMETHASONE SODIUM PHOSPHATE 10 MG/ML
INJECTION, SOLUTION INTRAMUSCULAR; INTRAVENOUS PRN
Status: DISCONTINUED | OUTPATIENT
Start: 2025-05-22 | End: 2025-05-22

## 2025-05-22 RX ORDER — VENLAFAXINE HYDROCHLORIDE 75 MG/1
75 CAPSULE, EXTENDED RELEASE ORAL DAILY
Status: DISCONTINUED | OUTPATIENT
Start: 2025-05-23 | End: 2025-05-26 | Stop reason: HOSPADM

## 2025-05-22 RX ORDER — HYDROMORPHONE HCL IN WATER/PF 6 MG/30 ML
0.4 PATIENT CONTROLLED ANALGESIA SYRINGE INTRAVENOUS
Status: DISCONTINUED | OUTPATIENT
Start: 2025-05-22 | End: 2025-05-26 | Stop reason: HOSPADM

## 2025-05-22 RX ORDER — OXYCODONE HYDROCHLORIDE 5 MG/1
5 TABLET ORAL EVERY 4 HOURS PRN
Status: DISCONTINUED | OUTPATIENT
Start: 2025-05-22 | End: 2025-05-26 | Stop reason: HOSPADM

## 2025-05-22 RX ORDER — HEPARIN SODIUM 5000 [USP'U]/.5ML
5000 INJECTION, SOLUTION INTRAVENOUS; SUBCUTANEOUS EVERY 8 HOURS
Status: DISCONTINUED | OUTPATIENT
Start: 2025-05-23 | End: 2025-05-26 | Stop reason: HOSPADM

## 2025-05-22 RX ORDER — SODIUM CHLORIDE, SODIUM LACTATE, POTASSIUM CHLORIDE, CALCIUM CHLORIDE 600; 310; 30; 20 MG/100ML; MG/100ML; MG/100ML; MG/100ML
INJECTION, SOLUTION INTRAVENOUS CONTINUOUS
Status: DISCONTINUED | OUTPATIENT
Start: 2025-05-22 | End: 2025-05-22 | Stop reason: HOSPADM

## 2025-05-22 RX ORDER — HEPARIN SODIUM 5000 [USP'U]/.5ML
5000 INJECTION, SOLUTION INTRAVENOUS; SUBCUTANEOUS
Status: COMPLETED | OUTPATIENT
Start: 2025-05-22 | End: 2025-05-22

## 2025-05-22 RX ORDER — METRONIDAZOLE 500 MG/100ML
500 INJECTION, SOLUTION INTRAVENOUS EVERY 12 HOURS
Status: DISCONTINUED | OUTPATIENT
Start: 2025-05-22 | End: 2025-05-22 | Stop reason: HOSPADM

## 2025-05-22 RX ORDER — INDOCYANINE GREEN AND WATER 25 MG
KIT INJECTION PRN
Status: DISCONTINUED | OUTPATIENT
Start: 2025-05-22 | End: 2025-05-22

## 2025-05-22 RX ORDER — LIDOCAINE 40 MG/G
CREAM TOPICAL
Status: DISCONTINUED | OUTPATIENT
Start: 2025-05-22 | End: 2025-05-22 | Stop reason: HOSPADM

## 2025-05-22 RX ORDER — ONDANSETRON 2 MG/ML
4 INJECTION INTRAMUSCULAR; INTRAVENOUS EVERY 6 HOURS PRN
Status: DISCONTINUED | OUTPATIENT
Start: 2025-05-22 | End: 2025-05-26 | Stop reason: HOSPADM

## 2025-05-22 RX ORDER — CEFAZOLIN SODIUM/WATER 2 G/20 ML
2 SYRINGE (ML) INTRAVENOUS
Status: COMPLETED | OUTPATIENT
Start: 2025-05-22 | End: 2025-05-22

## 2025-05-22 RX ORDER — INDOCYANINE GREEN AND WATER 25 MG
KIT INJECTION PRN
Status: DISCONTINUED | OUTPATIENT
Start: 2025-05-22 | End: 2025-05-22 | Stop reason: HOSPADM

## 2025-05-22 RX ORDER — OXYCODONE HYDROCHLORIDE 5 MG/1
10 TABLET ORAL EVERY 4 HOURS PRN
Status: DISCONTINUED | OUTPATIENT
Start: 2025-05-22 | End: 2025-05-26 | Stop reason: HOSPADM

## 2025-05-22 RX ORDER — PROPOFOL 10 MG/ML
INJECTION, EMULSION INTRAVENOUS CONTINUOUS PRN
Status: DISCONTINUED | OUTPATIENT
Start: 2025-05-22 | End: 2025-05-22

## 2025-05-22 RX ORDER — BISACODYL 10 MG
10 SUPPOSITORY, RECTAL RECTAL DAILY PRN
Status: DISCONTINUED | OUTPATIENT
Start: 2025-05-25 | End: 2025-05-26 | Stop reason: HOSPADM

## 2025-05-22 RX ORDER — PANTOPRAZOLE SODIUM 40 MG/1
40 TABLET, DELAYED RELEASE ORAL
Status: DISCONTINUED | OUTPATIENT
Start: 2025-05-23 | End: 2025-05-26 | Stop reason: HOSPADM

## 2025-05-22 RX ORDER — PROCHLORPERAZINE MALEATE 10 MG
10 TABLET ORAL EVERY 6 HOURS PRN
Status: DISCONTINUED | OUTPATIENT
Start: 2025-05-22 | End: 2025-05-26 | Stop reason: HOSPADM

## 2025-05-22 RX ORDER — SODIUM CHLORIDE 9 MG/ML
INJECTION, SOLUTION INTRAVENOUS CONTINUOUS PRN
Status: DISCONTINUED | OUTPATIENT
Start: 2025-05-22 | End: 2025-05-22

## 2025-05-22 RX ORDER — OXYCODONE HYDROCHLORIDE 5 MG/1
10 TABLET ORAL
Status: DISCONTINUED | OUTPATIENT
Start: 2025-05-22 | End: 2025-05-22 | Stop reason: HOSPADM

## 2025-05-22 RX ADMIN — ACETAMINOPHEN 975 MG: 325 TABLET ORAL at 23:14

## 2025-05-22 RX ADMIN — METRONIDAZOLE 500 MG: 500 INJECTION, SOLUTION INTRAVENOUS at 06:41

## 2025-05-22 RX ADMIN — Medication 2 G: at 07:55

## 2025-05-22 RX ADMIN — ROCURONIUM 20 MG: 50 INJECTION, SOLUTION INTRAVENOUS at 08:38

## 2025-05-22 RX ADMIN — HYDROMORPHONE HYDROCHLORIDE 0.4 MG: 0.2 INJECTION, SOLUTION INTRAMUSCULAR; INTRAVENOUS; SUBCUTANEOUS at 14:53

## 2025-05-22 RX ADMIN — KETOROLAC TROMETHAMINE 15 MG: 30 INJECTION, SOLUTION INTRAMUSCULAR at 14:26

## 2025-05-22 RX ADMIN — HYDROMORPHONE HYDROCHLORIDE 0.4 MG: 0.2 INJECTION, SOLUTION INTRAMUSCULAR; INTRAVENOUS; SUBCUTANEOUS at 23:15

## 2025-05-22 RX ADMIN — SODIUM CHLORIDE, SODIUM LACTATE, POTASSIUM CHLORIDE, AND CALCIUM CHLORIDE: .6; .31; .03; .02 INJECTION, SOLUTION INTRAVENOUS at 10:58

## 2025-05-22 RX ADMIN — HYDROMORPHONE HYDROCHLORIDE 0.2 MG: 0.2 INJECTION, SOLUTION INTRAMUSCULAR; INTRAVENOUS; SUBCUTANEOUS at 20:25

## 2025-05-22 RX ADMIN — ROCURONIUM 20 MG: 50 INJECTION, SOLUTION INTRAVENOUS at 10:44

## 2025-05-22 RX ADMIN — HEPARIN SODIUM 5000 UNITS: 10000 INJECTION, SOLUTION INTRAVENOUS; SUBCUTANEOUS at 06:44

## 2025-05-22 RX ADMIN — FENTANYL CITRATE 25 MCG: 50 INJECTION, SOLUTION INTRAMUSCULAR; INTRAVENOUS at 12:41

## 2025-05-22 RX ADMIN — HYDROMORPHONE HYDROCHLORIDE 0.25 MG: 1 INJECTION, SOLUTION INTRAMUSCULAR; INTRAVENOUS; SUBCUTANEOUS at 09:57

## 2025-05-22 RX ADMIN — PROPOFOL 25 MCG/KG/MIN: 10 INJECTION, EMULSION INTRAVENOUS at 08:05

## 2025-05-22 RX ADMIN — PHENYLEPHRINE HYDROCHLORIDE 100 MCG: 10 INJECTION INTRAVENOUS at 08:55

## 2025-05-22 RX ADMIN — FENTANYL CITRATE 50 MCG: 50 INJECTION, SOLUTION INTRAMUSCULAR; INTRAVENOUS at 12:51

## 2025-05-22 RX ADMIN — HYDROMORPHONE HYDROCHLORIDE 0.4 MG: 0.2 INJECTION, SOLUTION INTRAMUSCULAR; INTRAVENOUS; SUBCUTANEOUS at 13:57

## 2025-05-22 RX ADMIN — PHENYLEPHRINE HYDROCHLORIDE 0.2 MCG/KG/MIN: 10 INJECTION INTRAVENOUS at 08:11

## 2025-05-22 RX ADMIN — HYDROMORPHONE HYDROCHLORIDE 0.25 MG: 1 INJECTION, SOLUTION INTRAMUSCULAR; INTRAVENOUS; SUBCUTANEOUS at 10:37

## 2025-05-22 RX ADMIN — INDOCYANINE GREEN AND WATER 10 MG: KIT at 10:40

## 2025-05-22 RX ADMIN — SENNOSIDES AND DOCUSATE SODIUM 1 TABLET: 50; 8.6 TABLET ORAL at 20:24

## 2025-05-22 RX ADMIN — PHENYLEPHRINE HYDROCHLORIDE 150 MCG: 10 INJECTION INTRAVENOUS at 08:06

## 2025-05-22 RX ADMIN — HYDROMORPHONE HYDROCHLORIDE 0.25 MG: 1 INJECTION, SOLUTION INTRAMUSCULAR; INTRAVENOUS; SUBCUTANEOUS at 11:03

## 2025-05-22 RX ADMIN — PHENYLEPHRINE HYDROCHLORIDE 150 MCG: 10 INJECTION INTRAVENOUS at 07:50

## 2025-05-22 RX ADMIN — SODIUM CHLORIDE: 9 INJECTION, SOLUTION INTRAVENOUS at 07:49

## 2025-05-22 RX ADMIN — LIDOCAINE HYDROCHLORIDE 50 MG: 10 INJECTION, SOLUTION INFILTRATION; PERINEURAL at 07:43

## 2025-05-22 RX ADMIN — MIDAZOLAM HYDROCHLORIDE 2 MG: 1 INJECTION, SOLUTION INTRAMUSCULAR; INTRAVENOUS at 07:26

## 2025-05-22 RX ADMIN — HYDROMORPHONE HYDROCHLORIDE 0.25 MG: 1 INJECTION, SOLUTION INTRAMUSCULAR; INTRAVENOUS; SUBCUTANEOUS at 10:18

## 2025-05-22 RX ADMIN — OXYCODONE HYDROCHLORIDE 10 MG: 5 TABLET ORAL at 21:04

## 2025-05-22 RX ADMIN — HYDROMORPHONE HYDROCHLORIDE 0.4 MG: 0.2 INJECTION, SOLUTION INTRAMUSCULAR; INTRAVENOUS; SUBCUTANEOUS at 14:38

## 2025-05-22 RX ADMIN — DEXAMETHASONE SODIUM PHOSPHATE 10 MG: 10 INJECTION, SOLUTION INTRAMUSCULAR; INTRAVENOUS at 08:00

## 2025-05-22 RX ADMIN — SUGAMMADEX 200 MG: 100 INJECTION, SOLUTION INTRAVENOUS at 12:03

## 2025-05-22 RX ADMIN — HYDROMORPHONE HYDROCHLORIDE 0.2 MG: 0.2 INJECTION, SOLUTION INTRAMUSCULAR; INTRAVENOUS; SUBCUTANEOUS at 13:40

## 2025-05-22 RX ADMIN — PHENYLEPHRINE HYDROCHLORIDE 100 MCG: 10 INJECTION INTRAVENOUS at 08:52

## 2025-05-22 RX ADMIN — ACETAMINOPHEN 975 MG: 325 TABLET ORAL at 14:46

## 2025-05-22 RX ADMIN — HYDROMORPHONE HYDROCHLORIDE 0.2 MG: 0.2 INJECTION, SOLUTION INTRAMUSCULAR; INTRAVENOUS; SUBCUTANEOUS at 17:07

## 2025-05-22 RX ADMIN — ROCURONIUM 50 MG: 50 INJECTION, SOLUTION INTRAVENOUS at 07:44

## 2025-05-22 RX ADMIN — HYDROMORPHONE HYDROCHLORIDE 0.2 MG: 0.2 INJECTION, SOLUTION INTRAMUSCULAR; INTRAVENOUS; SUBCUTANEOUS at 13:25

## 2025-05-22 RX ADMIN — SODIUM CHLORIDE, PRESERVATIVE FREE: 5 INJECTION INTRAVENOUS at 16:49

## 2025-05-22 RX ADMIN — INDOCYANINE GREEN AND WATER 10 MG: KIT at 11:23

## 2025-05-22 RX ADMIN — FENTANYL CITRATE 25 MCG: 50 INJECTION, SOLUTION INTRAMUSCULAR; INTRAVENOUS at 12:31

## 2025-05-22 RX ADMIN — SODIUM CHLORIDE, SODIUM LACTATE, POTASSIUM CHLORIDE, AND CALCIUM CHLORIDE: .6; .31; .03; .02 INJECTION, SOLUTION INTRAVENOUS at 07:24

## 2025-05-22 RX ADMIN — PROPOFOL 200 MG: 10 INJECTION, EMULSION INTRAVENOUS at 07:43

## 2025-05-22 RX ADMIN — ROCURONIUM 15 MG: 50 INJECTION, SOLUTION INTRAVENOUS at 09:10

## 2025-05-22 RX ADMIN — FENTANYL CITRATE 100 MCG: 50 INJECTION, SOLUTION INTRAMUSCULAR; INTRAVENOUS at 07:43

## 2025-05-22 ASSESSMENT — ACTIVITIES OF DAILY LIVING (ADL)
ADLS_ACUITY_SCORE: 29
ADLS_ACUITY_SCORE: 27
ADLS_ACUITY_SCORE: 27
ADLS_ACUITY_SCORE: 29
ADLS_ACUITY_SCORE: 33
ADLS_ACUITY_SCORE: 27
ADLS_ACUITY_SCORE: 27
ADLS_ACUITY_SCORE: 29
ADLS_ACUITY_SCORE: 27
ADLS_ACUITY_SCORE: 29
ADLS_ACUITY_SCORE: 29
ADLS_ACUITY_SCORE: 27
ADLS_ACUITY_SCORE: 27
ADLS_ACUITY_SCORE: 33
ADLS_ACUITY_SCORE: 29

## 2025-05-22 NOTE — ANESTHESIA PROCEDURE NOTES
Airway         Procedure Start/Stop Times: 5/22/2025 7:45 AM  Staff -        CRNA: Viktoria Saldaña APRN CRNA       Performed By: CRNA  Consent for Airway        Urgency: elective  Indications and Patient Condition       Indications for airway management: ziyad-procedural       Induction type:intravenous       Mask difficulty assessment: 2 - vent by mask + OA or adjuvant +/- NMBA    Final Airway Details       Final airway type: endotracheal airway       Successful airway: ETT - single  Endotracheal Airway Details        ETT size (mm): 7.0       Cuffed: yes       Successful intubation technique: video laryngoscopy       VL Blade Size: Glidescope 3       Grade View of Cords: 1       Adjucts: stylet       Position: Right       Measured from: gums/teeth       Secured at (cm): 21       Bite block used: None    Post intubation assessment        Placement verified by: capnometry and equal breath sounds        Number of attempts at approach: 1       Number of other approaches attempted: 0       Secured with: tape       Ease of procedure: easy       Dentition: Intact    Medication(s) Administered   Medication Administration Time: 5/22/2025 7:45 AM

## 2025-05-22 NOTE — OP NOTE
[facility]    Operative Note    Pre-operative diagnosis: Diverticulitis [K57.92]  Colostomy in place (H) [Z93.3]  Post-operative diagnosis Same as pre-operative diagnosis    Procedure: SPENIC FLEXURE TAKEDOWN, COLECTOMY, SIGMOID, ROBOT-ASSISTED, LAPAROSCOPIC, USING DA CHRISSIE XI, N/A - Abdomen  CYSTOSCOPY, WITH BILATERAL URETERAL STENT INSERTION AND INDOCYANINE GREEN INSTILLATION, Bilateral - Bladder    Surgeon: Surgeons and Role:  Panel 1:     * Flaco Mendoza DO - Primary     * Lino rGayson MD - Assisting     * Priscila Okeefe PA-C - Assisting  Anesthesia: General   Estimated Blood Loss: 25 mL from 5/22/2025  7:29 AM to 5/22/2025 12:12 PM      Drains: Placed a 19 Turkish Daniel drain with the tip to the pelvis  Specimens:   ID Type Source Tests Collected by Time Destination   1 : SIGMOID COLON Resection Large Intestine, Colon, Sigmoid SURGICAL PATHOLOGY EXAM Flaco Mendoza DO 5/22/2025 11:06 AM      Findings:     - Completion resection of concern from the sigmoid perforation and the diverticulitis.  - Splenic flexure takedown    Complications: None.  Implants: * No implants in log *      Indication 59-year-old female presenting with a history of  diverticulitis that perforated and required interventional radiology placement of a drainage tube.  Patient was seen by me.  She was reevaluated and found to be in stable condition.  A colonoscopy was completed showing that the patient had pandiverticulosis.  The area of concern of the sigmoid colon had since resolved.  After evaluation offered patient procedure of robotic sigmoidectomy.  The risks and benefits of the procedure were explained detail to the patient. The risks include infection, bleeding, damage to the surrounding structures. Patient verbalized understanding provided consent to undergo the procedure above.    Procedure:   The patient was brought to the operating room and transferred to the operating room table.  She was  then sedated and intubated by the anesthesia team.  She was then placed into a lithotomy position.  Patient's abdomen the perineal region was then prepped and draped in the usual sterile fashion.  Dr. Henderson then completed his portion of the procedure for Curiel placement and stent placement of the bilateral ureters.  For completion of his portion procedure, she was then prepped and draped in usual sterile fashion.  Prior to the procedure, a timeout was completed.  All present were in agreement.     1% lidocaine with epinephrine was used to inject over the patient's left upper quadrant.  A 11 blade was then used to make a stab incision.  A Veress needle was inserted into the abdomen and insufflation was initiated.  x5 8 mm trocars were then inserted into the abdomen under Visiport guidance after insufflation.  Ports were placed supraumbilical and a transverse fashion across the patient's abdomen.  Patient's abdomen, we could visualize that the patient had some omentum that was adherent onto the anterior abdominal wall.  This was carefully taken down using the laparoscopic graspers.  At this time the 10 blade was then used to make a transverse incision over the pubic region and dissection through the subcutaneous tissue onto the fascia below was done using electrocautery.  Dissection through the anterior sheath was done electrocautery.  Electrocautery was then dissected into the abdomen to complete the fascial incision.  The Ihsan wound retractor was then placed and the cover was applied.  The robot was docked in a upper abdominal fashion in order to proceed with the splenic flexure takedown.    The omentum was then brought over the stomach exposing the transverse colon.  I then started using the vessel sealer to take down the white line of Toldt along the descending colon and then expanding that and extending it over towards the splenic flexure.  I then carefully dissected from the midportion of the transverse  colon laterally out towards the splenic flexure of the colon as well.  Care was taken to ensure that there is no injuries to the surrounding structures and spleen.  Upon completion of the splenic flexure takedown, the robot was undocked and then redocked into a pelvic position.    Dissection was then continued along the white line of Toldt to completely mobilize the descending colon.  Once this was done, firefly was used to confirm the locations of the ureters.  Dissection along the lateral edge of the sigmoid colon was done.  I could identify the area of perforation where there was still some inflammation and hardness on palpation.  This was carried down into the pelvis releasing the sigmoid colon all the way to the rectosigmoid junction.  The mesentery was then taken down using the robotic vessel sealer.  The surgical PA then sized up the EEA stapler.  A 31 Gibraltarian EEA stapler was selected.  After firefly, I selected an area of healthy descending colon tissue proximal to the area of concern on the sigmoid perforation from her previous diverticulitis.  I then used x 4 robotic 30 mm blue loads to transect across this area of the colon and at the rectosigmoid junction.  The specimen specimen was then removed through the Ihsan wound retractor.  A 31 Gibraltarian EEA anvil was then passed into the abdomen.    The EEA anvil was then placed into the colon.  The colon was then brought down to the pelvis.  Care was taken to ensure that there was no twisting of the colon and no twisting of the mesentery.  There was good laxity of the mesentery to ensure a tension-free anastomosis.  The EEA stapler was then used to complete a colorectal anastomosis.  A leak test was completed.  The leak test was negative.  A 19 Gibraltarian Daniel drain was then placed through the right-sided 8 mm trocar and brought down to the pelvis where terminated.  The robot was then undocked.  The Pfannenstiel incision was closed using a 3-0 Vicryl stitch to  approximate the peritoneal layer.  The fascia was closed using a 0 looped PDS suture.  All surgical sites were then reapproximated at the skin level using staplers.  At the end of the procedure, a final count was completed.  I was told that all sharps, sponges, instruments were accounted for.  Patient was extubated brought back to the PACU in stable condition.  The bilateral stents were removed by myself after extubation.    Dr. Grayson and Priscila VILLANUEVA was present to assist myself in the surgical case.  Their assistance was required for exposure and visualization, leading to decreased operating time, and decreased blood loss.                    Flaco Mendoza DO  General Surgeon  [facility]  Surgery [address] [address]?  Office: [phone]  Employed by - [facility]  Pager: [phone]

## 2025-05-22 NOTE — H&P
General Surgery H&P  Gia Sultana MRN# [id number]   Age/Sex: 59 year old female YOB: 1965     Reason for visit: Diverticulitis        Referring physician: Dr. Mccracken                    Assessment and Plan:   Assessment:  Diverticulitis   Morbid obesity  3.  Hypertension.     59-year-old female presenting with a history of colostomy.      Plan:  -Proceed to the OR today for robotic partial colon resection.  - The risks and benefits of the procedure were explained detail to the patient. The risks include infection, bleeding, damage to the surrounding structures. Patient verbalized understanding provided consent to undergo the procedure above.            Chief Complaint:   Here for surgery     History is obtained from the patient    HPI:   Gia Sultana is a 59 year old female who presents for robotic colon resection.  Patient is doing well since her colonoscopy.  Patient has no further complaints today.          Past Medical History:     Past Medical History:   Diagnosis Date    Anxiety     Arthritis     lower back and knees    Cholelithiasis     Depression     Diverticular disease of large intestine     Dyslipidemia     Female stress incontinence     GERD (gastroesophageal reflux disease)     Hepatic cyst 2018    HTN (hypertension)     Obese     Renal cyst     Snoring               Past Surgical History:     Past Surgical History:   Procedure Laterality Date    COLONOSCOPY N/A 2025    Procedure: COLONOSCOPY with polypectomy;  Surgeon: Flaco Mendoza DO;  Location: [facility]    ORTHOPEDIC SURGERY      Tirn miniscus surgery    [facility]  DELIVERY ONLY      Description:  Section;  Recorded: 10/16/2008;  Comments: x2    ZC LAP,CHOLECYSTECTOMY/EXPLORE      Description: Cholecystectomy Laparoscopic;  Recorded: 04/15/2008;    [facility] REVISN TRACHEA,CERVICAL      Tracheoplasty: Trach inserted as a child             Social History:    reports that she has been smoking cigarettes.  She has a 1.3 pack-year smoking history. She has never used smokeless tobacco. She reports that she does not currently use alcohol. She reports that she does not use drugs.           Family History:     Family History   Problem Relation Age of Onset    Diabetes Mother     Obesity Mother     Hypertension Mother     Cerebrovascular Disease Mother     Dementia Father     Diabetes Brother     Obesity Brother     Diabetes Maternal Grandmother     Thyroid Cancer No family hx of     Anesthesia Reaction No family hx of               Allergies:     Allergies   Allergen Reactions    Erythromycin     Penicillin G Other (See Comments)     Turned yellow    Penicillins Unknown    Sulfa (Sulfonamide Antibiotics) [Sulfa Antibiotics] Rash    Nexium [Esomeprazole] Palpitations     Felt like BP and HR increased/palitations.              Medications:     Prior to Admission medications    Medication Sig Start Date End Date Taking? Authorizing Provider   acetaminophen (TYLENOL) 325 MG tablet Take 325-975 mg by mouth every 6 hours as needed for mild pain (max of 4,000 mg in 24 hours).    Reported, Patient   cetirizine (ZYRTEC) 10 MG tablet Take 10 mg by mouth every morning.    Reported, Patient   magnesium oxide (MAG-OX) 400 MG tablet TAKE 1 TABLET BY MOUTH EVERY DAY 4/3/22   Mansi Mccracken MD   metroNIDAZOLE (FLAGYL) 500 MG tablet Take 2 tablets (1,000 mg) by mouth 3 times daily. 5/16/25   Flaco Mendoza DO   minoxidil (LONITEN) 2.5 MG tablet Take 1.25 mg by mouth every morning. 10/29/24   Reported, Patient   neomycin (MYCIFRADIN) 500 MG tablet Take 2 tablets (1,000 mg) by mouth 3 times daily. 5/16/25   Flaco Mendoza DO   omeprazole (PRILOSEC) 20 MG DR capsule TAKE 1 CAPSULE BY MOUTH EVERY DAY 1/31/25   Mansi Mccracken MD   Semaglutide-Weight Management (WEGOVY) 2.4 MG/0.75ML pen Inject 2.4 mg subcutaneously once a week. 1/9/25   Miki Nicholas MD   venlafaxine (EFFEXOR XR) 75 MG 24 hr capsule TAKE 1 CAPSULE BY MOUTH EVERY DAY  "11/1/24   Mansi Mccracken MD              Review of Systems:   A 12 point Review of Systems is negative other than noted in the HPI            Physical Exam:   Patient Vitals for the past 24 hrs:   BP Temp Pulse Resp SpO2 Height Weight   05/22/25 0614 (!) 133/94 98  F (36.7  C) 112 20 95 % 1.575 m (5' 2\") 94.3 kg (208 lb)        No intake or output data in the 24 hours ending 05/22/25 0703   Constitutional:   awake, alert, cooperative, no apparent distress, and appears stated age       Eyes:   PERRL, conjunctiva/corneas clear, EOM's intact; no scleral edema or icterus noted        ENT:   Normocephalic, without obvious abnormality, atraumatic, Lips, mucosa, and tongue normal        Hematologic / Lymphatic:   No lymphadenopathy       Lungs:   Normal respiratory effort, no accessory muscle use       Cardiovascular:   Regular rate and rhythm       Abdomen:   Soft, nondistended, nontender to palpation.  Obese abdomen.       Musculoskeletal:   No obvious swelling, bruising or deformity       Skin:   Skin color and texture normal for patient, no rashes or lesions              Data:            Flaco Mendoza DO  General Surgeon  [facility]  Surgery [address]  [address]  [place] [address]?  Office: [phone]  Employed by - [facility]  Pager: [phone]         "

## 2025-05-22 NOTE — ANESTHESIA POSTPROCEDURE EVALUATION
Patient: Gia Sultana    Procedure: Procedure(s):  SPENIC FLEXURE TAKEDOWN, COLECTOMY, SIGMOID, ROBOT-ASSISTED, LAPAROSCOPIC, USING DA CHRISSIE XI  CYSTOSCOPY, WITH BILATERAL URETERAL STENT INSERTION AND INDOCYANINE GREEN INSTILLATION       Anesthesia Type:  General    Note:  Disposition: Admission   Postop Pain Control: Uneventful            Sign Out: Well controlled pain   PONV: No   Neuro/Psych: Uneventful            Sign Out: Acceptable/Baseline neuro status   Airway/Respiratory: Uneventful            Sign Out: Acceptable/Baseline resp. status   CV/Hemodynamics: Uneventful            Sign Out: Acceptable CV status; No obvious hypovolemia; No obvious fluid overload   Other NRE: NONE   DID A NON-ROUTINE EVENT OCCUR? No           Last vitals:  Vitals Value Taken Time   /84 05/22/25 13:15   Temp 36.5  C (97.7  F) 05/22/25 12:30   Pulse 96 05/22/25 13:16   Resp 14 05/22/25 13:16   SpO2 98 % 05/22/25 13:16   Vitals shown include unfiled device data.    Electronically Signed By: Mayte Savage MD  May 22, 2025  1:17 PM

## 2025-05-22 NOTE — CONSULTS
"[facility]  Consult Note - Hospitalist Service  Date of Admission:  5/22/2025  Consult Requested by: Dr. Mendoza  Reason for Consult: Medical management     Assessment & Plan   Gia Sultana is a 59 year old female admitted on 5/22/2025 with a previous history of diverticulitis with perforation that required IR placement of drainage tube.  She underwent sigmoid colectomy with splenic flexure takedown on 5/22/2025.  [facility] was consulted for medical management.    Diverticulitis s/p partial colon resection  - Perioperative cares per surgery  - Follow-up on biopsy   - Agree with bowel regimen, pain regimen  - Diet per surgery, stop IV fluids pending appropriate oral intake  - DVT PPx per surgery  - AM labs   - Curiel management per protocol  - BRITT drain management per surgery     Post-op hypoxia  - Wean nasal canula as appropriate  - Encourage incentive spirometer, out of bed when able    Hypertension  - Per pre-op visit: Stable and controlled without need for medication     Dyslipidemia  - Per pre-op visit: Managed through healthy lifestyle    Mood  - Continue PTA Effexor    Obesity  - Holding PTA Wegovy         Clinically Significant Risk Factors Present on Admission                   # Hypertension: Noted on problem list           # Obesity: Estimated body mass index is 38.04 kg/m  as calculated from the following:    Height as of this encounter: 1.575 m (5' 2\").    Weight as of this encounter: 94.3 kg (208 lb).              Marvin Ariza DO  Hospitalist Service  Securely message with [employer] (more info)  Text page via [employer] Paging/Directory   ______________________________________________________________________    Chief Complaint   Partial colectomy     History is obtained from the patient    History of Present Illness   Gia Sultana is a 59 year old female who is presenting with history of diverticulitis status post partial colectomy with splenic flexure takedown 5/22/25.  Patient was seen " shortly after returning to room.  She states she is in some pain, particularly in the lower abdomen.  Otherwise denies any shortness of breath, chest pains/pleurisy, lightheadedness.  No fever/ chills.  She is fatigued as she just returned to room from the OR.  No additional complaints.  Family at bedside.      Past Medical History    Past Medical History:   Diagnosis Date    Anxiety     Arthritis     lower back and knees    Cholelithiasis     Depression     Diverticular disease of large intestine     Dyslipidemia     Female stress incontinence     GERD (gastroesophageal reflux disease)     Hepatic cyst 2018    HTN (hypertension)     Obese     Renal cyst     Snoring        Past Surgical History   Past Surgical History:   Procedure Laterality Date    COLONOSCOPY N/A 2025    Procedure: COLONOSCOPY with polypectomy;  Surgeon: Flaco Mendoza DO;  Location: [facility]    ORTHOPEDIC SURGERY      Tirn miniscus surgery    [facility]  DELIVERY ONLY      Description:  Section;  Recorded: 10/16/2008;  Comments: x2    ZZC LAP,CHOLECYSTECTOMY/EXPLORE      Description: Cholecystectomy Laparoscopic;  Recorded: 04/15/2008;    [facility] REVISN TRACHEA,CERVICAL      Tracheoplasty: Trach inserted as a child       Medications   I have reviewed this patient's current medications       Social History   I have reviewed this patient's social history and updated it with pertinent information if needed.  Social History     Tobacco Use    Smoking status: Every Day     Current packs/day: 0.25     Average packs/day: 0.3 packs/day for 5.0 years (1.3 ttl pk-yrs)     Types: Cigarettes    Smokeless tobacco: Never   Vaping Use    Vaping status: Never Used   Substance Use Topics    Alcohol use: Not Currently     Alcohol/week: 0.0 - 5.0 standard drinks of alcohol    Drug use: No         Family History   I have reviewed this patient's family history and updated it with pertinent information if needed.  Family History   Problem  Relation Age of Onset    Diabetes Mother     Obesity Mother     Hypertension Mother     Cerebrovascular Disease Mother     Dementia Father     Diabetes Brother     Obesity Brother     Diabetes Maternal Grandmother     Thyroid Cancer No family hx of     Anesthesia Reaction No family hx of          Allergies   Allergies   Allergen Reactions    Erythromycin     Penicillin G Other (See Comments)     Turned yellow    Penicillins Unknown    Sulfa (Sulfonamide Antibiotics) [Sulfa Antibiotics] Rash    Nexium [Esomeprazole] Palpitations     Felt like BP and HR increased/palitations.        Physical Exam   Vital Signs: Temp: 98.3  F (36.8  C) Temp src: Oral BP: 122/78 Pulse: 92   Resp: 16 SpO2: 96 % O2 Device: Nasal cannula Oxygen Delivery: 2 LPM  Weight: 208 lbs 0 oz    General Appearance: Fatigued, somnolent, nondiaphoretic, resting in bed comfortably  Respiratory: Shallow breathing without any rales, rales or rhonchi  Cardiovascular: RRR, no murmur, no S3, no JVD  GI: Tenderness throughout lower abdomen, hypoactive bowel sounds  Skin: Incision sites are clean without drainage.  There is a right sided BRITT drain with moderate amount of blood  Other: No focal deficits    Medical Decision Making       85 MINUTES SPENT BY ME on the date of service doing chart review, history, exam, documentation & further activities per the note.      Data         Imaging results reviewed over the past 24 hrs:   No results found for this or any previous visit (from the past 24 hours).

## 2025-05-22 NOTE — ANESTHESIA PREPROCEDURE EVALUATION
Anesthesia Pre-Procedure Evaluation    Patient: Gia Sultana   MRN: [id number] : 1965          Procedure : Procedure(s):  COLECTOMY, SIGMOID, ROBOT-ASSISTED, LAPAROSCOPIC, USING DA CHRISSIE XI  CYSTOSCOPY, WITH BILATERAL URETERAL STENT INSERTION AND INDOCYANINE GREEN INSTILLATION         Past Medical History:   Diagnosis Date    Anxiety     Arthritis     lower back and knees    Cholelithiasis     Depression     Diverticular disease of large intestine     Dyslipidemia     Female stress incontinence     GERD (gastroesophageal reflux disease)     Hepatic cyst 2018    HTN (hypertension)     Obese     Renal cyst     Snoring       Past Surgical History:   Procedure Laterality Date    COLONOSCOPY N/A 2025    Procedure: COLONOSCOPY with polypectomy;  Surgeon: Flaco Mendoza DO;  Location: [facility]    ORTHOPEDIC SURGERY      Tirn miniscus surgery    [facility]  DELIVERY ONLY      Description:  Section;  Recorded: 10/16/2008;  Comments: x2    ZC LAP,CHOLECYSTECTOMY/EXPLORE      Description: Cholecystectomy Laparoscopic;  Recorded: 04/15/2008;    [facility] REVISN TRACHEA,CERVICAL      Tracheoplasty: Trach inserted as a child      Allergies   Allergen Reactions    Erythromycin     Penicillin G Other (See Comments)     Turned yellow    Penicillins Unknown    Sulfa (Sulfonamide Antibiotics) [Sulfa Antibiotics] Rash    Nexium [Esomeprazole] Palpitations     Felt like BP and HR increased/palitations.      Social History     Tobacco Use    Smoking status: Every Day     Current packs/day: 0.25     Average packs/day: 0.3 packs/day for 5.0 years (1.3 ttl pk-yrs)     Types: Cigarettes    Smokeless tobacco: Never   Substance Use Topics    Alcohol use: Not Currently     Alcohol/week: 0.0 - 5.0 standard drinks of alcohol      Wt Readings from Last 1 Encounters:   25 94.3 kg (208 lb)        Anesthesia Evaluation            ROS/MED HX  ENT/Pulmonary:       Neurologic:       Cardiovascular:     (+)   "hypertension- -   -  - -                          Irregular Heartbeat/Palpitations,            METS/Exercise Tolerance:     Hematologic:       Musculoskeletal:       GI/Hepatic:     (+) GERD,                   Renal/Genitourinary:       Endo:     (+)               Obesity,       Psychiatric/Substance Use:     (+) psychiatric history depression       Infectious Disease:       Malignancy:       Other:              Physical Exam  Airway  Mallampati: II  TM distance: >3 FB  Neck ROM: full  Mouth opening: >= 4 cm    Cardiovascular   Rhythm: regular  Rate: normal rate     Dental Comments: wnl      Pulmonary Breath sounds clear to auscultation        Neurological   She appears awake, alert and oriented x3.    Other Findings       OUTSIDE LABS:  CBC:   Lab Results   Component Value Date    WBC 9.3 12/31/2024    WBC 13.7 (H) 12/30/2024    HGB 14.6 05/01/2025    HGB 11.9 12/31/2024    HCT 36.1 12/31/2024    HCT 43.1 12/30/2024     12/31/2024     12/30/2024     BMP:   Lab Results   Component Value Date     05/01/2025     12/31/2024    POTASSIUM 5.0 05/01/2025    POTASSIUM 3.7 12/31/2024    CHLORIDE 100 05/01/2025    CHLORIDE 100 12/31/2024    CO2 28 05/01/2025    CO2 25 12/31/2024    BUN 10.8 05/01/2025    BUN 11.4 12/31/2024    CR 0.92 05/01/2025    CR 0.72 12/31/2024     (H) 05/22/2025    GLC 93 05/01/2025     COAGS: No results found for: \"PTT\", \"INR\", \"FIBR\"  POC: No results found for: \"BGM\", \"HCG\", \"HCGS\"  HEPATIC:   Lab Results   Component Value Date    ALBUMIN 3.7 12/30/2024    PROTTOTAL 8.0 12/30/2024    ALT  12/30/2024      Comment:      Unsatisfactory specimen - hemolyzed     AST  12/30/2024      Comment:      Unsatisfactory specimen - hemolyzed     ALKPHOS 110 12/30/2024    BILITOTAL 0.2 12/30/2024     OTHER:   Lab Results   Component Value Date    A1C 5.8 (H) 09/28/2021    TONY 10.2 05/01/2025    MAG 2.0 08/05/2024    LIPASE 31 12/30/2024    TSH 2.24 08/05/2024       Anesthesia " "Plan    ASA Status:  2      NPO Status: NPO Appropriate   Anesthesia Type: General.  Airway: oral.  Induction: intravenous.  Maintenance: Balanced.   Techniques and Equipment:       - Monitoring Plan: standard ASA monitoring     Consents    Anesthesia Plan(s) and associated risks, benefits, and realistic alternatives discussed. Questions answered and patient/representative(s) expressed understanding.     - Discussed:     - Discussed with:  Patient               Postoperative Care    Pain management: multimodal analgesia.     Comments:    Other Comments: NPO. Meds reviewed. No recent URI. No CP or SOB. No problems with anesthesia in past.               Mayte Savage MD    I have reviewed the pertinent notes and labs in the chart from the past 30 days and (re)examined the patient.  Any updates or changes from those notes are reflected in this note.    Clinically Significant Risk Factors Present on Admission                   # Hypertension: Noted on problem list           # Obesity: Estimated body mass index is 38.04 kg/m  as calculated from the following:    Height as of this encounter: 1.575 m (5' 2\").    Weight as of this encounter: 94.3 kg (208 lb).                    "

## 2025-05-22 NOTE — OP NOTE
OPERATIVE REPORT    PREOPERATIVE DIAGNOSIS: Diverticulitis [K57.92]  Colostomy in place (H) [Z93.3]     POSTOPERATIVE DIAGNOSIS:  Same    PROCEDURES PERFORMED:   Cystoscopy  Placement of right ureter catheter instillation of ICG  Placement of left ureter catheter and instillation of ICG    STAFF SURGEON: Indio Henderson MD present for entire case.     RESIDENT(S): None    ANESTHESIA: General    ESTIMATED BLOOD LOSS: 0 mL.     DRAINS/TUBES:   Right 5 Bruneian open ended catheter  Left 5 Bruneian open ended catheter  16 Bruneian genao catheter    IV FLUIDS: Please see dictated anesthesia record  COMPLICATIONS: None.   SPECIMEN:   None    SIGNIFICANT FINDINGS:   Normal appearing bladder  Orthotopic ureter orifices  Bilateral 5 Bruneian open ended catheters placed with 5 ml ICG in each ureter    OPERATIVE INDICATIONS:   Gia Sultana is a 59 year old female who is here for colectomy robotic assisted. I was asked to place ureteral catheters for identification and instillation of ICG. The patient was counseled on the alternatives, risks, and benefits and elected to proceed with the above stated procedure.    DESCRIPTION OF PROCEDURE:    After informed consent was obtained, the patient was taken to the operating room, and moved to the operating table.  After adequate anesthesia was induced, the patient was repositioned in dorsal lithotomy position and prepped and draped in the usual sterile fashion. A timeout was taken to confirm correct patient, procedure and laterality.     A 22-Slovenian cystoscope was inserted into a well lubricated urethra. The bladder was free of tumors, stones.  Bilateral ureteral orifices were orthotopic.  A  guidewire was advanced to the right ureter orifice the aid of a 5-Slovenian open ended ureteral catheter. We passed wire until met slight resistance. 5 Bruneian open ended catheter was advanced to 20 cm marcelle. Wire was removed and 5 ml of ICG was instilled through the 5 Bruneian open ended catheter  while I pulled the 5 [place] Islander back to level of ureteral orifice. The wire was replaced and 5 [place] Islander open ended catheter advanced again to 20 cm marcelle. The same was done for the left side. A 16 [place] Islander genao catheter was then placed. The stents were secured to genao with 0-silk tie. The stents and genao placed to drainage.     The case was handed over to Dr Mendoza    PLAN:   - Can remove the stents at end of case. Genao removal at discretion of Dr Mendoza

## 2025-05-22 NOTE — ANESTHESIA CARE TRANSFER NOTE
Patient: Gia Sultana    Procedure: Procedure(s):  SPENIC FLEXURE TAKEDOWN, COLECTOMY, SIGMOID, ROBOT-ASSISTED, LAPAROSCOPIC, USING DA CHRISSIE XI  CYSTOSCOPY, WITH BILATERAL URETERAL STENT INSERTION AND INDOCYANINE GREEN INSTILLATION       Diagnosis: Diverticulitis [K57.92]  Colostomy in place (H) [Z93.3]  Diagnosis Additional Information: No value filed.    Anesthesia Type:   General     Note:    Oropharynx: oropharynx clear of all foreign objects  Level of Consciousness: drowsy  Oxygen Supplementation: face mask  Level of Supplemental Oxygen (L/min / FiO2): 8  Independent Airway: airway patency satisfactory and stable  Dentition: dentition unchanged  Vital Signs Stable: post-procedure vital signs reviewed and stable  Report to RN Given: handoff report given  Patient transferred to: PACU    Handoff Report: Identifed the Patient, Identified the Reponsible Provider, Reviewed the pertinent medical history, Discussed the surgical course, Reviewed Intra-OP anesthesia mangement and issues during anesthesia, Set expectations for post-procedure period and Allowed opportunity for questions and acknowledgement of understanding      Vitals:  Vitals Value Taken Time   /75 05/22/25 12:15   Temp 36.5    Pulse 97 05/22/25 12:17   Resp 14 05/22/25 12:17   SpO2 97 % 05/22/25 12:17   Vitals shown include unfiled device data.    Electronically Signed By: KAREEM Quan CRNA  May 22, 2025  12:18 PM

## 2025-05-22 NOTE — CARE PLAN
Dual Skin Assessment Note:    Patient transferred from PACU from to P2.     Comprehensive skin inspection completed by myself and Darshana Rea RN.     Abnormal skin assessment findings: none    LDA Initiated for skin breakdown/non-blanchable redness: Not applicable    Provider notified: Not applicable    If yes, WOC Consult order obtained: Not applicable    Mo Fairchild RN 4:28 PM

## 2025-05-22 NOTE — PHARMACY-ADMISSION MEDICATION HISTORY
Pharmacist Admission Medication History    Admission medication history is complete. The information provided in this note is only as accurate as the sources available at the time of the update.    Information Source(s): [facility]     Changes made to PTA medication list:  Added: None  Deleted: metronidazole and neomycin (prep completed 5/21 pm)  Changed: None    Allergies reviewed with patient and updates made in EHR: Reviewed with RN in surgical admission unit    Medication History Completed By: [place] Finn RPH 5/22/2025 5:03 PM    PTA Med List   Medication Sig Note Last Dose/Taking    acetaminophen (TYLENOL) 325 MG tablet Take 325-975 mg by mouth every 6 hours as needed for mild pain (max of 4,000 mg in 24 hours).  Unknown    cetirizine (ZYRTEC) 10 MG tablet Take 10 mg by mouth daily.  Past Week    minoxidil (LONITEN) 2.5 MG tablet Take 1.25 mg by mouth every morning.  Past Week    omeprazole (PRILOSEC) 20 MG DR capsule TAKE 1 CAPSULE BY MOUTH EVERY DAY  Past Week    Semaglutide-Weight Management (WEGOVY) 2.4 MG/0.75ML pen Inject 2.4 mg subcutaneously once a week. 5/22/2025: Stopped 5/13 for surgery Past Month    venlafaxine (EFFEXOR XR) 75 MG 24 hr capsule TAKE 1 CAPSULE BY MOUTH EVERY DAY  Past Week

## 2025-05-22 NOTE — PROGRESS NOTES
I met with Gia  She is getting robotic colectomy with Dr Mendoza  I was asked to place ureter stents for identification  In particular I discussed the risks of bleeding, infection, and risks of obstruction to ureter and potential need for indwelling stents.      I discussed that use of ICG will be off label in the ureter but has been done routinely for identification of ureter and studies have shown no change in complication rate.      Informed consent was obtained and all questions answered     Indio Henderson MD

## 2025-05-23 LAB
ANION GAP SERPL CALCULATED.3IONS-SCNC: 8 MMOL/L (ref 7–15)
BUN SERPL-MCNC: 12.7 MG/DL (ref 8–23)
CALCIUM SERPL-MCNC: 8.2 MG/DL (ref 8.8–10.4)
CHLORIDE SERPL-SCNC: 105 MMOL/L (ref 98–107)
CREAT SERPL-MCNC: 0.9 MG/DL (ref 0.51–0.95)
EGFRCR SERPLBLD CKD-EPI 2021: 73 ML/MIN/1.73M2
ERYTHROCYTE [DISTWIDTH] IN BLOOD BY AUTOMATED COUNT: 13.4 % (ref 10–15)
GLUCOSE BLDC GLUCOMTR-MCNC: 106 MG/DL (ref 70–99)
GLUCOSE SERPL-MCNC: 99 MG/DL (ref 70–99)
HCO3 SERPL-SCNC: 27 MMOL/L (ref 22–29)
HCT VFR BLD AUTO: 38.8 % (ref 35–47)
HGB BLD-MCNC: 12.8 G/DL (ref 11.7–15.7)
MAGNESIUM SERPL-MCNC: 1.7 MG/DL (ref 1.7–2.3)
MCH RBC QN AUTO: 27.8 PG (ref 26.5–33)
MCHC RBC AUTO-ENTMCNC: 33 G/DL (ref 31.5–36.5)
MCV RBC AUTO: 84 FL (ref 78–100)
PHOSPHATE SERPL-MCNC: 3.6 MG/DL (ref 2.5–4.5)
PLATELET # BLD AUTO: 288 10E3/UL (ref 150–450)
POTASSIUM SERPL-SCNC: 4 MMOL/L (ref 3.4–5.3)
RBC # BLD AUTO: 4.6 10E6/UL (ref 3.8–5.2)
SODIUM SERPL-SCNC: 140 MMOL/L (ref 135–145)
WBC # BLD AUTO: 11.5 10E3/UL (ref 4–11)

## 2025-05-23 PROCEDURE — 99233 SBSQ HOSP IP/OBS HIGH 50: CPT | Performed by: INTERNAL MEDICINE

## 2025-05-23 PROCEDURE — 84100 ASSAY OF PHOSPHORUS: CPT | Performed by: SURGERY

## 2025-05-23 PROCEDURE — 82435 ASSAY OF BLOOD CHLORIDE: CPT | Performed by: SURGERY

## 2025-05-23 PROCEDURE — 250N000011 HC RX IP 250 OP 636: Mod: JZ | Performed by: SURGERY

## 2025-05-23 PROCEDURE — 85041 AUTOMATED RBC COUNT: CPT | Performed by: SURGERY

## 2025-05-23 PROCEDURE — 83735 ASSAY OF MAGNESIUM: CPT | Performed by: SURGERY

## 2025-05-23 PROCEDURE — 82947 ASSAY GLUCOSE BLOOD QUANT: CPT | Performed by: SURGERY

## 2025-05-23 PROCEDURE — 36415 COLL VENOUS BLD VENIPUNCTURE: CPT | Performed by: SURGERY

## 2025-05-23 PROCEDURE — 258N000003 HC RX IP 258 OP 636: Performed by: SURGERY

## 2025-05-23 PROCEDURE — 250N000013 HC RX MED GY IP 250 OP 250 PS 637: Performed by: SURGERY

## 2025-05-23 PROCEDURE — 250N000013 HC RX MED GY IP 250 OP 250 PS 637: Performed by: STUDENT IN AN ORGANIZED HEALTH CARE EDUCATION/TRAINING PROGRAM

## 2025-05-23 PROCEDURE — 85018 HEMOGLOBIN: CPT | Performed by: SURGERY

## 2025-05-23 PROCEDURE — 120N000001 HC R&B MED SURG/OB

## 2025-05-23 RX ORDER — HYDRALAZINE HYDROCHLORIDE 20 MG/ML
10 INJECTION INTRAMUSCULAR; INTRAVENOUS EVERY 4 HOURS PRN
Status: DISCONTINUED | OUTPATIENT
Start: 2025-05-23 | End: 2025-05-26 | Stop reason: HOSPADM

## 2025-05-23 RX ADMIN — ACETAMINOPHEN 975 MG: 325 TABLET ORAL at 15:08

## 2025-05-23 RX ADMIN — HEPARIN SODIUM 5000 UNITS: 5000 INJECTION, SOLUTION INTRAVENOUS; SUBCUTANEOUS at 15:08

## 2025-05-23 RX ADMIN — PANTOPRAZOLE SODIUM 40 MG: 40 TABLET, DELAYED RELEASE ORAL at 06:44

## 2025-05-23 RX ADMIN — SENNOSIDES AND DOCUSATE SODIUM 1 TABLET: 50; 8.6 TABLET ORAL at 20:35

## 2025-05-23 RX ADMIN — OXYCODONE HYDROCHLORIDE 10 MG: 5 TABLET ORAL at 20:35

## 2025-05-23 RX ADMIN — SODIUM CHLORIDE, PRESERVATIVE FREE: 5 INJECTION INTRAVENOUS at 11:33

## 2025-05-23 RX ADMIN — OXYCODONE HYDROCHLORIDE 10 MG: 5 TABLET ORAL at 11:30

## 2025-05-23 RX ADMIN — VENLAFAXINE HYDROCHLORIDE 75 MG: 75 CAPSULE, EXTENDED RELEASE ORAL at 08:52

## 2025-05-23 RX ADMIN — HYDROMORPHONE HYDROCHLORIDE 0.4 MG: 0.2 INJECTION, SOLUTION INTRAMUSCULAR; INTRAVENOUS; SUBCUTANEOUS at 05:37

## 2025-05-23 RX ADMIN — CETIRIZINE HYDROCHLORIDE 10 MG: 10 TABLET, FILM COATED ORAL at 08:53

## 2025-05-23 RX ADMIN — OXYCODONE HYDROCHLORIDE 10 MG: 5 TABLET ORAL at 02:24

## 2025-05-23 RX ADMIN — ACETAMINOPHEN 975 MG: 325 TABLET ORAL at 23:04

## 2025-05-23 RX ADMIN — HYDROMORPHONE HYDROCHLORIDE 0.2 MG: 0.2 INJECTION, SOLUTION INTRAMUSCULAR; INTRAVENOUS; SUBCUTANEOUS at 09:22

## 2025-05-23 RX ADMIN — HEPARIN SODIUM 5000 UNITS: 5000 INJECTION, SOLUTION INTRAVENOUS; SUBCUTANEOUS at 23:03

## 2025-05-23 RX ADMIN — SENNOSIDES AND DOCUSATE SODIUM 1 TABLET: 50; 8.6 TABLET ORAL at 08:53

## 2025-05-23 RX ADMIN — OXYCODONE HYDROCHLORIDE 10 MG: 5 TABLET ORAL at 16:20

## 2025-05-23 RX ADMIN — ACETAMINOPHEN 975 MG: 325 TABLET ORAL at 06:43

## 2025-05-23 RX ADMIN — MINOXIDIL 1.25 MG: 2.5 TABLET ORAL at 08:53

## 2025-05-23 ASSESSMENT — ACTIVITIES OF DAILY LIVING (ADL)
ADLS_ACUITY_SCORE: 34
ADLS_ACUITY_SCORE: 43
ADLS_ACUITY_SCORE: 43
ADLS_ACUITY_SCORE: 34
ADLS_ACUITY_SCORE: 41
ADLS_ACUITY_SCORE: 34
ADLS_ACUITY_SCORE: 41
ADLS_ACUITY_SCORE: 34
ADLS_ACUITY_SCORE: 41
ADLS_ACUITY_SCORE: 34
ADLS_ACUITY_SCORE: 34
ADLS_ACUITY_SCORE: 33
ADLS_ACUITY_SCORE: 41
ADLS_ACUITY_SCORE: 34
ADLS_ACUITY_SCORE: 43
ADLS_ACUITY_SCORE: 34
ADLS_ACUITY_SCORE: 33
ADLS_ACUITY_SCORE: 34
ADLS_ACUITY_SCORE: 34

## 2025-05-23 NOTE — PROGRESS NOTES
"[facility]    Medicine Progress Note - Hospitalist Service    Date of Admission:  5/22/2025    Assessment & Plan   59 year old female with history of obesity, mood disorder, and diverticulitis with perforation that required IR placement of drainage tube admitted on 5/22/2025 after planned sigmoid colectomy with splenic flexure takedown.  [facility] was consulted for medical management.     Diverticulitis s/p partial colon resection 5/22/25  - Post op cares, pain control and dispo per surgery  - Follow-up on biopsy results  - ok to stop IV fluids given diet advancement    - Curiel management per protocol  - BRITT drain management per surgery   - started on PO PPI post op     Post-op hypoxia  - Wean nasal canula as appropriate  - push IS and ambulation     Hypertension  - Per pre-op visit: Stable and controlled without need for medication chronically  - have prn hydralazine available     Dyslipidemia  - Per pre-op visit: Managed through healthy lifestyle     Mood  - Continue PTA Effexor     Obesity  - Holding PTA Wegovy    Hair loss:   - continue home minoxidil     Allergies: continue home Zyrtec        Diet: Full Liquid Diet    DVT Prophylaxis: Heparin SQ  Curiel Catheter: PRESENT, indication: Surgical procedure  Lines: None     Cardiac Monitoring: None  Code Status: Full Code      Clinically Significant Risk Factors                   # Hypertension: Noted on problem list            # Obesity: Estimated body mass index is 38.04 kg/m  as calculated from the following:    Height as of this encounter: 1.575 m (5' 2\").    Weight as of this encounter: 94.3 kg (208 lb)., PRESENT ON ADMISSION            Disposition Plan   Medically Ready for Discharge: Anticipated in 2-4 Days      Marvin Stewart DO  Hospitalist Service  [facility]  Securely message with [employer] (more info)  Text page via [email or web address] Paging/Directory "   ______________________________________________________________________    Interval History   NAD. Denies any current complaints besides fatigue    Physical Exam   Vital Signs: Temp: 98.2  F (36.8  C) Temp src: Oral BP: 122/60 Pulse: 84   Resp: 16 SpO2: 95 % O2 Device: None (Room air) Oxygen Delivery: 2 LPM  Weight: 208 lbs 0 oz  General: NAD  RESPIRATORY: Breathing nonlabored  CARDIOVASCULAR: No le edema bilat.   NEUROLOGIC: Motor and sensory intact, speech clear         >50 MINUTES SPENT BY ME on the date of service doing chart review, history, exam, documentation & further activities per the note.  Data

## 2025-05-23 NOTE — PROGRESS NOTES
Pt. Rates pain at a 3  about a half hour ago. Ice pack removed.   Pt. Was startled woke up from sleep.

## 2025-05-23 NOTE — PLAN OF CARE
Problem: Adult Inpatient Plan of Care  Goal: Optimal Comfort and Wellbeing  Outcome: Progressing  Intervention: Monitor Pain and Promote Comfort  Recent Flowsheet Documentation  Taken 5/22/2025 1707 by Mo Fairchild RN  Pain Management Interventions: medication (see MAR)  Taken 5/22/2025 1621 by Mo Fairchild RN  Pain Management Interventions:   rest   relaxation techniques promoted     Problem: Surgery Nonspecified  Goal: Fluid and Electrolyte Balance  Outcome: Progressing  Goal: Anesthesia/Sedation Recovery  Intervention: Optimize Anesthesia Recovery  Recent Flowsheet Documentation  Taken 5/22/2025 1600 by Mo Fairchild RN  Safety Promotion/Fall Prevention:   activity supervised   clutter free environment maintained   patient and family education  Goal: Optimal Pain Control and Function  Outcome: Progressing  Intervention: Prevent or Manage Pain  Recent Flowsheet Documentation  Taken 5/22/2025 1707 by Mo Fairchild RN  Pain Management Interventions: medication (see MAR)  Taken 5/22/2025 1621 by Mo Fairchild RN  Pain Management Interventions:   rest   relaxation techniques promoted  Goal: Nausea and Vomiting Relief  Outcome: Progressing  Intervention: Prevent or Manage Nausea and Vomiting  Recent Flowsheet Documentation  Taken 5/22/2025 1600 by Mo Fairchild RN  Nausea/Vomiting Interventions: (no complaints at this time) --  Goal: Effective Urinary Elimination  Outcome: Progressing  Goal: Effective Oxygenation and Ventilation  Intervention: Optimize Oxygenation and Ventilation  Recent Flowsheet Documentation  Taken 5/22/2025 1600 by Mo Fairchild RN  Activity Management: activity adjusted per tolerance  Head of Bed (HOB) Positioning: HOB at 30 degrees     Problem: Pain Acute  Goal: Optimal Pain Control and Function  Outcome: Progressing  Intervention: Develop Pain Management Plan  Recent Flowsheet Documentation  Taken 5/22/2025 1707 by Mo Fairchild RN  Pain Management Interventions:  medication (see MAR)  Taken 5/22/2025 1621 by Mo Fairchild, RN  Pain Management Interventions:   rest   relaxation techniques promoted  Intervention: Prevent or Manage Pain  Recent Flowsheet Documentation  Taken 5/22/2025 1600 by Mo Fairchild, RN  Medication Review/Management: medications reviewed    Patient arrived on unit from PACU at approximately 1610.  Pain control better.  Abdominal pain 5-6.  Dilaudid helpful.  Tolerating water and ice chips.  Did not want to try anything more a this time.  No nausea noted.  Bowel sounds hypoactive.  Incisions clean, dry and intact.  BRITT and genao catheter intact and patent.

## 2025-05-23 NOTE — PROGRESS NOTES
General Surgery Progress Note:    Hospital Day # 1    ASSESSMENT:   Diverticulitis  Morbid obesity  Hypertension      Gia Sultana is a 59 year old female status post robotic assisted sigmoidectomy with ureteral stent placement.  The patient is doing well from her sigmoidectomy.  She has pain from the Pfannenstiel incision and the Curiel catheter.  Because the patient still has some irritation from the stents, she still has a little blood-tinged urine.  This is expected.  Otherwise, the patient is doing well after surgery.    PLAN:   -Will advance her from clear liquid diet to full liquid diet.  If the patient does well today she can be advanced to a low residue diet before discharge.  -Plan is to discharge with the abdominal drain.  - Patient is educated on the importance of increasing activity as tolerated to have return of bowel function  -We will keep the Curiel for now given that she still has some blood-tinged urine.  - Appreciate medical team support with medical management.  - Plan is for discharge and the next 2 to 3 days.  - Continue with IV fluids for now given that she still has no significant oral intake yet postoperatively.        SUBJECTIVE:   Gia Sultana was seen on rounds.  Patient has not had much of an appetite and has not had too much clear liquid diet postoperatively.  The patient states that most of her pain is down by where the Curiel is and where the extraction Pfannenstiel incision is.  Otherwise, the patient has not passed significant gas.  No nausea no vomiting at this time.    Patient Vitals for the past 24 hrs:   BP Temp Temp src Pulse Resp SpO2   05/23/25 0246 133/71 98.2  F (36.8  C) Oral 87 16 97 %   05/22/25 2355 130/69 98.6  F (37  C) Oral 92 19 96 %   05/22/25 1938 122/78 98.3  F (36.8  C) Oral 92 16 96 %   05/22/25 1815 124/71 98.1  F (36.7  C) Oral 95 16 96 %   05/22/25 1720 127/81 -- Oral 92 16 95 %   05/22/25 1615 136/81 98.2  F (36.8  C) Oral 94 16 95 %   05/22/25 1545  127/79 -- -- 97 10 95 %   05/22/25 1530 134/79 -- -- 99 10 95 %   05/22/25 1515 130/75 -- -- 99 12 94 %   05/22/25 1500 133/71 97.8  F (36.6  C) Core 98 12 93 %   05/22/25 1445 138/81 -- -- 100 10 94 %   05/22/25 1430 138/83 -- -- 97 10 94 %   05/22/25 1415 134/79 -- -- 98 11 93 %   05/22/25 1400 136/79 97.8  F (36.6  C) Core 97 15 94 %   05/22/25 1345 129/79 -- -- 97 12 94 %   05/22/25 1330 128/81 97.7  F (36.5  C) Core 96 12 97 %   05/22/25 1320 -- -- -- -- -- 92 %   05/22/25 1315 139/84 -- -- 95 14 97 %   05/22/25 1300 (!) 141/84 97.6  F (36.4  C) Core 94 12 95 %   05/22/25 1251 -- -- -- -- -- 94 %   05/22/25 1245 (!) 142/83 -- -- 93 12 94 %   05/22/25 1235 -- -- -- -- -- 97 %   05/22/25 1230 (!) 144/85 97.7  F (36.5  C) Core 91 13 96 %   05/22/25 1215 129/75 97.7  F (36.5  C) Core 99 14 97 %   05/22/25 1214 127/77 97.7  F (36.5  C) Core 99 -- 97 %       Physical Exam:  General: NAD, pleasant  CV:RRR  LUNGS:Normal respiratory effort, no accessory muscle use  ABD: Obese abdomen.  Surgical incisions well-approximated with mild shadowing around the surgical dressings.  Abdominal drain has serosanguineous output  EXT:no CCE      Flaco Mendoza DO  General Surgeon  [facility]  [facility] - [address]  [address]  [place] [address]?  Office: [phone]  Employed by - [facility]  Pager: [phone]

## 2025-05-23 NOTE — DISCHARGE INSTRUCTIONS
From your General Surgery Team:  You were seen by [facility] general surgery.  If you do not have an appointment and would like to schedule a follow up appointment with general surgery in clinic, please call us at [phone] to schedule an appointment at your convenience.      Daily Drain Log:  If you have a surgical drain in place we would like you to track how much is coming out of the drain at least daily until your follow-up appointment to help us to decide the best timing for removing the drain. If you notice the drain changes greatly in color in a short amount of time (for example light yellow to dark green, thin fluid to thick or bad smelling fluid) please call our clinic at [phone]    Date Daily Output in milliliters (mL) Color (yellow tinged, reddish yellow, dark red, etc.)   and/or character (thin, cloudy, thick) Other Notes

## 2025-05-23 NOTE — PROGRESS NOTES
Bloody drainage through BRITT and genao cath.  Repositioned how cath anchor was and put new one and bladder scanned for less than 51 ml's.   Gave ice pack to reduce the swelling of abdomen and will give more pain medication. She states the recent dose did nothing.

## 2025-05-23 NOTE — PLAN OF CARE
Goal Outcome Evaluation:         Pain getting better, continue to monitor.    Both chadwick and BRITT draining dark old blood.

## 2025-05-23 NOTE — PLAN OF CARE
Problem: Adult Inpatient Plan of Care  Goal: Optimal Comfort and Wellbeing  Outcome: Progressing   Goal Outcome Evaluation:         Pt is alert and oriented, able to make needs known. PRN  oxycodone and iv dilaudid given for pain. Pain went from a 7 to a 3. Scheduled tylenol and ice packs also given. Curiel output dark bloody output 250ml. BRITT output 25ml. Drsg's dry and intact, no new drainage noted. Pt is on 2L of oxygen sating 98%. Pt is on clears but not drinking much overnight. Pt resting inbetween cares.  Radha Conde, RN

## 2025-05-23 NOTE — PLAN OF CARE
Walked the tao once and up in the chair. Pain controlled with narcotics. Up to the chair for a couple hours. Urine brown mixed with blood, and surgery aware. Did not want to have anything from full liquids. Drinking water.

## 2025-05-24 ENCOUNTER — APPOINTMENT (OUTPATIENT)
Dept: RADIOLOGY | Facility: HOSPITAL | Age: 60
End: 2025-05-24
Attending: INTERNAL MEDICINE
Payer: COMMERCIAL

## 2025-05-24 LAB
ALBUMIN SERPL BCG-MCNC: 3.4 G/DL (ref 3.5–5.2)
ANION GAP SERPL CALCULATED.3IONS-SCNC: 7 MMOL/L (ref 7–15)
BUN SERPL-MCNC: 10.6 MG/DL (ref 8–23)
CALCIUM SERPL-MCNC: 9 MG/DL (ref 8.8–10.4)
CHLORIDE SERPL-SCNC: 102 MMOL/L (ref 98–107)
CREAT SERPL-MCNC: 0.81 MG/DL (ref 0.51–0.95)
EGFRCR SERPLBLD CKD-EPI 2021: 83 ML/MIN/1.73M2
ERYTHROCYTE [DISTWIDTH] IN BLOOD BY AUTOMATED COUNT: 13.4 % (ref 10–15)
GLUCOSE BLDC GLUCOMTR-MCNC: 96 MG/DL (ref 70–99)
GLUCOSE SERPL-MCNC: 94 MG/DL (ref 70–99)
HCO3 SERPL-SCNC: 29 MMOL/L (ref 22–29)
HCT VFR BLD AUTO: 41 % (ref 35–47)
HGB BLD-MCNC: 12.8 G/DL (ref 11.7–15.7)
HOLD SPECIMEN: NORMAL
MAGNESIUM SERPL-MCNC: 1.7 MG/DL (ref 1.7–2.3)
MCH RBC QN AUTO: 26.9 PG (ref 26.5–33)
MCHC RBC AUTO-ENTMCNC: 31.2 G/DL (ref 31.5–36.5)
MCV RBC AUTO: 86 FL (ref 78–100)
PHOSPHATE SERPL-MCNC: 2.2 MG/DL (ref 2.5–4.5)
PLATELET # BLD AUTO: 313 10E3/UL (ref 150–450)
POTASSIUM SERPL-SCNC: 3.8 MMOL/L (ref 3.4–5.3)
PROCALCITONIN SERPL IA-MCNC: 0.28 NG/ML
RBC # BLD AUTO: 4.75 10E6/UL (ref 3.8–5.2)
SODIUM SERPL-SCNC: 138 MMOL/L (ref 135–145)
WBC # BLD AUTO: 13 10E3/UL (ref 4–11)

## 2025-05-24 PROCEDURE — 84132 ASSAY OF SERUM POTASSIUM: CPT | Performed by: INTERNAL MEDICINE

## 2025-05-24 PROCEDURE — 250N000013 HC RX MED GY IP 250 OP 250 PS 637: Performed by: STUDENT IN AN ORGANIZED HEALTH CARE EDUCATION/TRAINING PROGRAM

## 2025-05-24 PROCEDURE — 272N000202 HC AEROBIKA WITH MANOMETER

## 2025-05-24 PROCEDURE — 36415 COLL VENOUS BLD VENIPUNCTURE: CPT | Performed by: INTERNAL MEDICINE

## 2025-05-24 PROCEDURE — 71046 X-RAY EXAM CHEST 2 VIEWS: CPT

## 2025-05-24 PROCEDURE — 999N000156 HC STATISTIC RCP CONSULT EA 30 MIN

## 2025-05-24 PROCEDURE — 85014 HEMATOCRIT: CPT | Performed by: INTERNAL MEDICINE

## 2025-05-24 PROCEDURE — 999N000157 HC STATISTIC RCP TIME EA 10 MIN

## 2025-05-24 PROCEDURE — 258N000003 HC RX IP 258 OP 636: Performed by: INTERNAL MEDICINE

## 2025-05-24 PROCEDURE — 99232 SBSQ HOSP IP/OBS MODERATE 35: CPT | Performed by: INTERNAL MEDICINE

## 2025-05-24 PROCEDURE — 84520 ASSAY OF UREA NITROGEN: CPT | Performed by: INTERNAL MEDICINE

## 2025-05-24 PROCEDURE — 250N000013 HC RX MED GY IP 250 OP 250 PS 637: Performed by: SURGERY

## 2025-05-24 PROCEDURE — 83735 ASSAY OF MAGNESIUM: CPT | Performed by: INTERNAL MEDICINE

## 2025-05-24 PROCEDURE — 84145 PROCALCITONIN (PCT): CPT | Performed by: INTERNAL MEDICINE

## 2025-05-24 PROCEDURE — 99232 SBSQ HOSP IP/OBS MODERATE 35: CPT | Mod: 24

## 2025-05-24 PROCEDURE — 120N000001 HC R&B MED SURG/OB

## 2025-05-24 PROCEDURE — 94799 UNLISTED PULMONARY SVC/PX: CPT

## 2025-05-24 PROCEDURE — 250N000011 HC RX IP 250 OP 636: Performed by: SURGERY

## 2025-05-24 PROCEDURE — 87070 CULTURE OTHR SPECIMN AEROBIC: CPT | Performed by: INTERNAL MEDICINE

## 2025-05-24 PROCEDURE — 85041 AUTOMATED RBC COUNT: CPT | Performed by: INTERNAL MEDICINE

## 2025-05-24 RX ORDER — DEXTROSE MONOHYDRATE AND SODIUM CHLORIDE 5; .9 G/100ML; G/100ML
INJECTION, SOLUTION INTRAVENOUS CONTINUOUS
Status: ACTIVE | OUTPATIENT
Start: 2025-05-24 | End: 2025-05-25

## 2025-05-24 RX ADMIN — ONDANSETRON 4 MG: 4 TABLET, ORALLY DISINTEGRATING ORAL at 07:53

## 2025-05-24 RX ADMIN — OXYCODONE HYDROCHLORIDE 10 MG: 5 TABLET ORAL at 07:53

## 2025-05-24 RX ADMIN — CETIRIZINE HYDROCHLORIDE 10 MG: 10 TABLET, FILM COATED ORAL at 08:03

## 2025-05-24 RX ADMIN — ACETAMINOPHEN 975 MG: 325 TABLET ORAL at 06:12

## 2025-05-24 RX ADMIN — SENNOSIDES AND DOCUSATE SODIUM 1 TABLET: 50; 8.6 TABLET ORAL at 08:03

## 2025-05-24 RX ADMIN — HEPARIN SODIUM 5000 UNITS: 5000 INJECTION, SOLUTION INTRAVENOUS; SUBCUTANEOUS at 22:12

## 2025-05-24 RX ADMIN — SENNOSIDES AND DOCUSATE SODIUM 1 TABLET: 50; 8.6 TABLET ORAL at 22:12

## 2025-05-24 RX ADMIN — OXYCODONE HYDROCHLORIDE 10 MG: 5 TABLET ORAL at 22:40

## 2025-05-24 RX ADMIN — OXYCODONE HYDROCHLORIDE 10 MG: 5 TABLET ORAL at 13:18

## 2025-05-24 RX ADMIN — VENLAFAXINE HYDROCHLORIDE 75 MG: 75 CAPSULE, EXTENDED RELEASE ORAL at 08:03

## 2025-05-24 RX ADMIN — HEPARIN SODIUM 5000 UNITS: 5000 INJECTION, SOLUTION INTRAVENOUS; SUBCUTANEOUS at 06:14

## 2025-05-24 RX ADMIN — PANTOPRAZOLE SODIUM 40 MG: 40 TABLET, DELAYED RELEASE ORAL at 06:12

## 2025-05-24 RX ADMIN — DEXTROSE AND SODIUM CHLORIDE: 5; 900 INJECTION, SOLUTION INTRAVENOUS at 10:26

## 2025-05-24 RX ADMIN — ACETAMINOPHEN 975 MG: 325 TABLET ORAL at 16:01

## 2025-05-24 RX ADMIN — HEPARIN SODIUM 5000 UNITS: 5000 INJECTION, SOLUTION INTRAVENOUS; SUBCUTANEOUS at 13:19

## 2025-05-24 RX ADMIN — OXYCODONE HYDROCHLORIDE 10 MG: 5 TABLET ORAL at 18:48

## 2025-05-24 RX ADMIN — MINOXIDIL 1.25 MG: 2.5 TABLET ORAL at 07:53

## 2025-05-24 RX ADMIN — POLYETHYLENE GLYCOL 3350 17 G: 17 POWDER, FOR SOLUTION ORAL at 08:03

## 2025-05-24 ASSESSMENT — ACTIVITIES OF DAILY LIVING (ADL)
ADLS_ACUITY_SCORE: 43
ADLS_ACUITY_SCORE: 41
ADLS_ACUITY_SCORE: 43
ADLS_ACUITY_SCORE: 41
ADLS_ACUITY_SCORE: 43
ADLS_ACUITY_SCORE: 43
ADLS_ACUITY_SCORE: 41
ADLS_ACUITY_SCORE: 43

## 2025-05-24 NOTE — PLAN OF CARE
Problem: Pain Acute  Goal: Optimal Pain Control and Function  Intervention: Optimize Psychosocial Wellbeing  Recent Flowsheet Documentation  Taken 5/24/2025 0045 by Amna Horvath RN  Diversional Activities: television     Problem: Surgery Nonspecified  Goal: Absence of Bleeding  Outcome: Progressing  Goal: Effective Bowel Elimination  Outcome: Progressing  Goal: Anesthesia/Sedation Recovery  Intervention: Optimize Anesthesia Recovery  Recent Flowsheet Documentation  Taken 5/24/2025 0045 by Amna Horvath RN  Safety Promotion/Fall Prevention:   activity supervised   increased rounding and observation   increase visualization of patient   safety round/check completed  Goal: Optimal Pain Control and Function  Outcome: Progressing  Intervention: Prevent or Manage Pain  Recent Flowsheet Documentation  Taken 5/24/2025 0045 by Amna Horvath RN  Diversional Activities: television  Goal: Effective Oxygenation and Ventilation  Intervention: Optimize Oxygenation and Ventilation  Recent Flowsheet Documentation  Taken 5/24/2025 0045 by Amna Horvath RN  Activity Management:   activity adjusted per tolerance   activity encouraged  Head of Bed (HOB) Positioning: HOB at 30 degrees   Goal Outcome Evaluation:    Patient is A/O x4. Slow speech and slow to respond; VSS on RA. Pt reports incisional pain, scant amt of drainage from rt surgical incisions; No bm yet, not passing gas; able to make needs known; genao with tea colored output

## 2025-05-24 NOTE — PROGRESS NOTES
General Surgery Progress Note:    Hospital Day # 2    ASSESSMENT:  No diagnosis found.    Gia Sultana is a 59 year old female who is s/p Robot assisted laparoscopic sigmoid colectomy with splenic flexure takedown and cystoscopy with bilateral stent placement on 5/22 for h/o perforated diverticulitis.  Postoperatively patient had hematuria, better today so we will remove genao and trial of void. Continue on clears for now as she had some nausea with this and not too much intake.     PLAN:  -Full liquid diet to continue no carbonation  -Continue monitoring for ROBF, encourage ambulation to promote this  -Continue with IV fluids until intake increased / patient has voided  -Okay for genao removal please trial of void  -Continue surgical drain, will discharge with this, continue stripping  -Follow-up appointment for drain check and staple removal scheduled    SUBJECTIVE:   Gia Sultana was seen on rounds.  States she is doing okay, she has been able to ambulate.  She feels like she had some nausea with the food clears.  She is not feeling very hungry.  Feels like she started to pass flatus.  Has not had a bowel movement postoperatively yet per patient.  Her pain is generally controlled with pills.  Denies severe abdominal pain, fever, chills    VITALS RANGE:  Temp:  [98  F (36.7  C)-98.2  F (36.8  C)] 98  F (36.7  C)  Pulse:  [84-88] 87  Resp:  [14-16] 14  BP: (122-141)/(58-75) 141/65  SpO2:  [92 %-95 %] 92 %    PHYSICAL EXAM:  General: patient seen resting in bed in no acute distress  Resp: no increased work of breathing, breathing comfortably on room air  Abdomen: Generally soft and incisions are clean, dry, intact with staples in place.  Drains: Serosanguineous to relatively sanguinous output in the bulb with bulb suction  Output by Drain (mL) 05/22/25 0700 - 05/22/25 1459 05/22/25 1500 - 05/22/25 2259 05/22/25 2300 - 05/23/25 0659 05/23/25 0700 - 05/23/25 1459 05/23/25 1500 - 05/23/25 2259 05/23/25 2300 -  05/24/25 0659 05/24/25 0700 - 05/24/25 1122   Drain Closed/Suction 1 Lateral RLQ Bulb 19 Tajik 25 105 25 35 60 40       Extremities: No edema or cyanosis visualized on exam, no obvious deformities    05/23 0700 - 05/24 0659  In: 720 [P.O.:360; I.V.:360]  Out: 835 [Urine:700; Drains:135]    Admission on 05/22/2025   Component Date Value    GLUCOSE BY METER POCT 05/22/2025 155 (H)     Sodium 05/23/2025 140     Potassium 05/23/2025 4.0     Chloride 05/23/2025 105     Carbon Dioxide (CO2) 05/23/2025 27     Anion Gap 05/23/2025 8     Urea Nitrogen 05/23/2025 12.7     Creatinine 05/23/2025 0.90     GFR Estimate 05/23/2025 73     Calcium 05/23/2025 8.2 (L)     Glucose 05/23/2025 99     Magnesium 05/23/2025 1.7     Phosphorus 05/23/2025 3.6     WBC Count 05/23/2025 11.5 (H)     RBC Count 05/23/2025 4.60     Hemoglobin 05/23/2025 12.8     Hematocrit 05/23/2025 38.8     MCV 05/23/2025 84     MCH 05/23/2025 27.8     MCHC 05/23/2025 33.0     RDW 05/23/2025 13.4     Platelet Count 05/23/2025 288     GLUCOSE BY METER POCT 05/23/2025 106 (H)     WBC Count 05/24/2025 13.0 (H)     RBC Count 05/24/2025 4.75     Hemoglobin 05/24/2025 12.8     Hematocrit 05/24/2025 41.0     MCV 05/24/2025 86     MCH 05/24/2025 26.9     MCHC 05/24/2025 31.2 (L)     RDW 05/24/2025 13.4     Platelet Count 05/24/2025 313     GLUCOSE BY METER POCT 05/24/2025 96     Hold Specimen 05/24/2025 JIC     Sodium 05/24/2025 138     Potassium 05/24/2025 3.8     Chloride 05/24/2025 102     Carbon Dioxide (CO2) 05/24/2025 29     Anion Gap 05/24/2025 7     Glucose 05/24/2025 94     Urea Nitrogen 05/24/2025 10.6     Creatinine 05/24/2025 0.81     GFR Estimate 05/24/2025 83     Calcium 05/24/2025 9.0     Albumin 05/24/2025 3.4 (L)     Phosphorus 05/24/2025 2.2 (L)     Magnesium 05/24/2025 1.7         Katharine Bernardo PA-C  [facility]  [address] [place] [address] [phone]

## 2025-05-24 NOTE — TREATMENT PLAN
RCAT Treatment Plan    Patient Score: 10  Patient Acuity: 4    Clinical Indication for Therapy: productive cough    Therapy Ordered: BID flutter valve    Assessment Summary: Pt admitted with diverticulitis with perforation now s/p drainage tube placement with planned colectomy.  Former smoker, no chronic pulmonary history noted. On RA, scattered coarse breath sounds, no distress, productive cough.  Per assessment/RCAT protocol pt is appropriate for BID flutter valve.  Pt demonstrates good technique, will also use on own PRN.     Ashlyn Washington, RT  5/24/2025

## 2025-05-24 NOTE — PROGRESS NOTES
"[facility]    Medicine Progress Note - Hospitalist Service    Date of Admission:  5/22/2025    Assessment & Plan     59 year old female with history of obesity, mood disorder, and diverticulitis with perforation that required IR placement of drainage tube admitted on 5/22/2025 after planned sigmoid colectomy with splenic flexure takedown.  [facility] was consulted for medical management.     Diverticulitis s/p sigmoidectomy 5/22/25  - Post op cares, pain control and dispo per surgery  - Follow-up on biopsy results  - Restart gentle IVF as not taking hardly in anything PO   - Curiel management per surgery  - BRITT drain management per surgery   - PPI      Acute hypoxic respiratory failure  - On 2L O2 PNC, sPO2 92%  - 2V CXR  - Procalcitonin, sputum culture  - WBC 13K from 11K  - Wean nasal canula as appropriate  - push IS, flutter valve and ambulation  - Consider antibiotic pending CXR     Hypertension  - Per pre-op visit: Stable and controlled without need for medication chronically  - have prn hydralazine available     Dyslipidemia  - Per pre-op visit: Managed through healthy lifestyle     MDD  DARLENE  - Effexor     Class III Obesity  - Hold Wegovy     Hair loss   - Minoxidil     Seasonal allergies  - Zyrtec              Diet: Full Liquid Diet    DVT Prophylaxis: Heparin SQ  Curiel Catheter: PRESENT, indication: Surgical procedure  Lines: None     Cardiac Monitoring: None  Code Status: Full Code      Clinically Significant Risk Factors           # Hypocalcemia: Lowest Ca = 8.2 mg/dL in last 2 days, will monitor and replace as appropriate     # Hypoalbuminemia: Lowest albumin = 3.4 g/dL at 5/24/2025  6:45 AM, will monitor as appropriate     # Hypertension: Noted on problem list            # Obesity: Estimated body mass index is 38.04 kg/m  as calculated from the following:    Height as of this encounter: 1.575 m (5' 2\").    Weight as of this encounter: 94.3 kg (208 lb)., PRESENT ON ADMISSION        "     Social Drivers of Health    Tobacco Use: High Risk (5/22/2025)    Patient History     Smoking Tobacco Use: Every Day     Smokeless Tobacco Use: Never          Disposition Plan     Medically Ready for Discharge: Anticipated in 2-4 Days             Candido France DO, DO  Hospitalist Service  [facility]  Securely message with Tammy (more info)  Text page via [facility] Paging/Directory   ______________________________________________________________________    Interval History     Afebrile. Pain relatively controlled, worse with any movement or cough. Coughs up greenish tinged sputum, gas pains but no passage of flatus or BM. Still has genao in per surgery. No vomiting, cp, sob. Minimal PO intake.    Physical Exam   Vital Signs: Temp: 98  F (36.7  C) Temp src: Oral BP: (!) 141/65 Pulse: 87   Resp: 14 SpO2: 92 % O2 Device: None (Room air)    Weight: 208 lbs 0 oz    Gen nad  Cv rrr, no edema  Lungs decreased bases, scattered rhonchi, no wheezing  Abd bs+, nd, incisions c/d/I  Neuro non-focal    Lab/imaging reviewed

## 2025-05-24 NOTE — PLAN OF CARE
Up in chair, felt gas discomfort. Walked tao 2 times, back to bed, tired. Mild productive cough that started yesterday. Incentive spirometry encouraged. Oxy 10mg for 8/10 abdominal pain. Tolerating full liquids. Provided encouragement and teaching regarding incentive spirometer. Encouraged 10 breaths per hour. Pt said she had been using it. Produced sputum which was sent to laboratory. Did 2 walks this shift. Appetite poor, but did eat some of the soup, and an ice cream, no nausea today. Passed gas today. BRITT drain stripped which was tolerable for the patient today. More incentive spirometer encouraged.

## 2025-05-24 NOTE — PLAN OF CARE
Problem: Adult Inpatient Plan of Care  Goal: Optimal Comfort and Wellbeing  Intervention: Monitor Pain and Promote Comfort  Recent Flowsheet Documentation  Taken 5/24/2025 1600 by Felipe Rangel, RN  Pain Management Interventions:   medication (see MAR)   breathing exercises   care clustered   distraction   emotional support   pillow support provided   quiet environment facilitated   relaxation techniques promoted   repositioned   rest   therapeutic presence     Problem: Surgery Nonspecified  Goal: Effective Oxygenation and Ventilation  Outcome: Progressing  Intervention: Optimize Oxygenation and Ventilation  Recent Flowsheet Documentation  Taken 5/24/2025 1600 by Felipe Rangel, RN  Cough And Deep Breathing: done independently per patient  Activity Management: activity adjusted per tolerance   Goal Outcome Evaluation:       VSS on RA. A&Ox4. Pain in abdomen managed w/ sched tylenol & PRN oxy. Up w/ 1. IVF infusing per order- D5 NS at 50 ml/hr. Still has low PO intake on full liquid diet. PVR = 0. BRITT drain w/ serosang OP. Daughter visiting at bedside.

## 2025-05-24 NOTE — PLAN OF CARE
"Goal Outcome Evaluation:      Pt reports generalized abdominal pain 8/10. PRN 10mg Oxycodone given bringing pain to a 3 or 4/10.  PRN 10mg Oxycodone given later in the evening for pain 8/10.  Poor appetite for dinner.  Full liquid diet encouraged.  Pt ate about 50% of a bowel of tomato soup.  Declined Q shift BRITT drain to be stripped.  Reports \"it had already been done.\"  Drainage serosangenous. Curiel draining chilo output.  Pt resting most of shift between cares, did not get oob.  Incentive spirometer taught to pt.  Pt had poor tolerance reaching about 1000 about 8 times.       Problem: Adult Inpatient Plan of Care  Goal: Absence of Hospital-Acquired Illness or Injury  Intervention: Prevent and Manage VTE (Venous Thromboembolism) Risk  Recent Flowsheet Documentation  Taken 5/23/2025 1624 by Hanna Strange, RN  VTE Prevention/Management: SCDs on (sequential compression devices)     Problem: Adult Inpatient Plan of Care  Goal: Plan of Care Review  Description: The Plan of Care Review/Shift note should be completed every shift.  The Outcome Evaluation is a brief statement about your assessment that the patient is improving, declining, or no change.  This information will be displayed automatically on your shift  note.  Outcome: Progressing  Flowsheets (Taken 5/23/2025 2330)  Plan of Care Reviewed With: patient  Overall Patient Progress: improving     Problem: Adult Inpatient Plan of Care  Goal: Optimal Comfort and Wellbeing  Outcome: Progressing     Problem: Adult Inpatient Plan of Care  Goal: Optimal Comfort and Wellbeing  Intervention: Monitor Pain and Promote Comfort  Recent Flowsheet Documentation  Taken 5/23/2025 2117 by Hanna Strange, RN  Pain Management Interventions: rest  Taken 5/23/2025 1702 by Hanna Strange, RN  Pain Management Interventions: rest  Taken 5/23/2025 1620 by Hanna Strnage, RN  Pain Management Interventions:   medication (see MAR)   cold applied     Problem: Adult Inpatient Plan " of Care  Goal: Readiness for Transition of Care  Outcome: Progressing     Problem: Surgery Nonspecified  Goal: Optimal Pain Control and Function  Intervention: Prevent or Manage Pain  Recent Flowsheet Documentation  Taken 5/23/2025 2117 by Hanna Strange, RN  Pain Management Interventions: rest  Taken 5/23/2025 1702 by Hanna Strange, RN  Pain Management Interventions: rest  Taken 5/23/2025 1624 by Hanna Strange, RN  Diversional Activities: television  Taken 5/23/2025 1620 by Hanna Strange, RN  Pain Management Interventions:   medication (see MAR)   cold applied           Plan of Care Reviewed With: patient    Overall Patient Progress: improvingOverall Patient Progress: improving

## 2025-05-25 LAB
ALBUMIN SERPL BCG-MCNC: 3.3 G/DL (ref 3.5–5.2)
ANION GAP SERPL CALCULATED.3IONS-SCNC: 4 MMOL/L (ref 7–15)
BASOPHILS # BLD AUTO: 0.1 10E3/UL (ref 0–0.2)
BASOPHILS NFR BLD AUTO: 1 %
BUN SERPL-MCNC: 9.4 MG/DL (ref 8–23)
CALCIUM SERPL-MCNC: 9 MG/DL (ref 8.8–10.4)
CHLORIDE SERPL-SCNC: 103 MMOL/L (ref 98–107)
CREAT SERPL-MCNC: 0.85 MG/DL (ref 0.51–0.95)
EGFRCR SERPLBLD CKD-EPI 2021: 78 ML/MIN/1.73M2
EOSINOPHIL # BLD AUTO: 0.5 10E3/UL (ref 0–0.7)
EOSINOPHIL NFR BLD AUTO: 5 %
ERYTHROCYTE [DISTWIDTH] IN BLOOD BY AUTOMATED COUNT: 13.4 % (ref 10–15)
GLUCOSE SERPL-MCNC: 101 MG/DL (ref 70–99)
HCO3 SERPL-SCNC: 33 MMOL/L (ref 22–29)
HCT VFR BLD AUTO: 40 % (ref 35–47)
HGB BLD-MCNC: 12.5 G/DL (ref 11.7–15.7)
IMM GRANULOCYTES # BLD: 0.1 10E3/UL
IMM GRANULOCYTES NFR BLD: 1 %
LYMPHOCYTES # BLD AUTO: 2.9 10E3/UL (ref 0.8–5.3)
LYMPHOCYTES NFR BLD AUTO: 27 %
MAGNESIUM SERPL-MCNC: 1.7 MG/DL (ref 1.7–2.3)
MCH RBC QN AUTO: 27 PG (ref 26.5–33)
MCHC RBC AUTO-ENTMCNC: 31.3 G/DL (ref 31.5–36.5)
MCV RBC AUTO: 86 FL (ref 78–100)
MONOCYTES # BLD AUTO: 0.7 10E3/UL (ref 0–1.3)
MONOCYTES NFR BLD AUTO: 6 %
NEUTROPHILS # BLD AUTO: 6.6 10E3/UL (ref 1.6–8.3)
NEUTROPHILS NFR BLD AUTO: 61 %
NRBC # BLD AUTO: 0 10E3/UL
NRBC BLD AUTO-RTO: 0 /100
PHOSPHATE SERPL-MCNC: 2.8 MG/DL (ref 2.5–4.5)
PLATELET # BLD AUTO: 304 10E3/UL (ref 150–450)
POTASSIUM SERPL-SCNC: 3.7 MMOL/L (ref 3.4–5.3)
RBC # BLD AUTO: 4.63 10E6/UL (ref 3.8–5.2)
SODIUM SERPL-SCNC: 140 MMOL/L (ref 135–145)
WBC # BLD AUTO: 10.9 10E3/UL (ref 4–11)

## 2025-05-25 PROCEDURE — 99231 SBSQ HOSP IP/OBS SF/LOW 25: CPT | Mod: 24

## 2025-05-25 PROCEDURE — 250N000011 HC RX IP 250 OP 636: Performed by: SURGERY

## 2025-05-25 PROCEDURE — 94799 UNLISTED PULMONARY SVC/PX: CPT

## 2025-05-25 PROCEDURE — 82310 ASSAY OF CALCIUM: CPT | Performed by: INTERNAL MEDICINE

## 2025-05-25 PROCEDURE — 85018 HEMOGLOBIN: CPT | Performed by: INTERNAL MEDICINE

## 2025-05-25 PROCEDURE — 120N000001 HC R&B MED SURG/OB

## 2025-05-25 PROCEDURE — 36415 COLL VENOUS BLD VENIPUNCTURE: CPT | Performed by: INTERNAL MEDICINE

## 2025-05-25 PROCEDURE — 250N000013 HC RX MED GY IP 250 OP 250 PS 637: Performed by: SURGERY

## 2025-05-25 PROCEDURE — 999N000157 HC STATISTIC RCP TIME EA 10 MIN

## 2025-05-25 PROCEDURE — 99233 SBSQ HOSP IP/OBS HIGH 50: CPT | Performed by: HOSPITALIST

## 2025-05-25 PROCEDURE — 84132 ASSAY OF SERUM POTASSIUM: CPT | Performed by: INTERNAL MEDICINE

## 2025-05-25 PROCEDURE — 85025 COMPLETE CBC W/AUTO DIFF WBC: CPT | Performed by: INTERNAL MEDICINE

## 2025-05-25 PROCEDURE — 258N000003 HC RX IP 258 OP 636: Performed by: INTERNAL MEDICINE

## 2025-05-25 PROCEDURE — 250N000013 HC RX MED GY IP 250 OP 250 PS 637: Performed by: STUDENT IN AN ORGANIZED HEALTH CARE EDUCATION/TRAINING PROGRAM

## 2025-05-25 PROCEDURE — 83735 ASSAY OF MAGNESIUM: CPT | Performed by: INTERNAL MEDICINE

## 2025-05-25 RX ORDER — POLYETHYLENE GLYCOL 3350 17 G/17G
17 POWDER, FOR SOLUTION ORAL DAILY
Status: DISCONTINUED | OUTPATIENT
Start: 2025-05-25 | End: 2025-05-25

## 2025-05-25 RX ORDER — SENNOSIDES 8.6 MG
8.6 TABLET ORAL 2 TIMES DAILY
Status: DISCONTINUED | OUTPATIENT
Start: 2025-05-25 | End: 2025-05-25

## 2025-05-25 RX ADMIN — ACETAMINOPHEN 975 MG: 325 TABLET ORAL at 17:20

## 2025-05-25 RX ADMIN — SENNOSIDES AND DOCUSATE SODIUM 1 TABLET: 50; 8.6 TABLET ORAL at 10:04

## 2025-05-25 RX ADMIN — DEXTROSE AND SODIUM CHLORIDE: 5; 900 INJECTION, SOLUTION INTRAVENOUS at 06:07

## 2025-05-25 RX ADMIN — OXYCODONE HYDROCHLORIDE 5 MG: 5 TABLET ORAL at 13:18

## 2025-05-25 RX ADMIN — ACETAMINOPHEN 975 MG: 325 TABLET ORAL at 07:41

## 2025-05-25 RX ADMIN — CETIRIZINE HYDROCHLORIDE 10 MG: 10 TABLET, FILM COATED ORAL at 10:04

## 2025-05-25 RX ADMIN — OXYCODONE HYDROCHLORIDE 5 MG: 5 TABLET ORAL at 07:41

## 2025-05-25 RX ADMIN — HEPARIN SODIUM 5000 UNITS: 5000 INJECTION, SOLUTION INTRAVENOUS; SUBCUTANEOUS at 13:18

## 2025-05-25 RX ADMIN — VENLAFAXINE HYDROCHLORIDE 75 MG: 75 CAPSULE, EXTENDED RELEASE ORAL at 10:04

## 2025-05-25 RX ADMIN — OXYCODONE HYDROCHLORIDE 10 MG: 5 TABLET ORAL at 17:20

## 2025-05-25 RX ADMIN — POLYETHYLENE GLYCOL 3350 17 G: 17 POWDER, FOR SOLUTION ORAL at 10:04

## 2025-05-25 RX ADMIN — HEPARIN SODIUM 5000 UNITS: 5000 INJECTION, SOLUTION INTRAVENOUS; SUBCUTANEOUS at 21:33

## 2025-05-25 RX ADMIN — MINOXIDIL 1.25 MG: 2.5 TABLET ORAL at 07:41

## 2025-05-25 RX ADMIN — SENNOSIDES AND DOCUSATE SODIUM 1 TABLET: 50; 8.6 TABLET ORAL at 21:33

## 2025-05-25 RX ADMIN — ACETAMINOPHEN 975 MG: 325 TABLET ORAL at 01:07

## 2025-05-25 RX ADMIN — PANTOPRAZOLE SODIUM 40 MG: 40 TABLET, DELAYED RELEASE ORAL at 06:07

## 2025-05-25 RX ADMIN — HEPARIN SODIUM 5000 UNITS: 5000 INJECTION, SOLUTION INTRAVENOUS; SUBCUTANEOUS at 06:08

## 2025-05-25 ASSESSMENT — ACTIVITIES OF DAILY LIVING (ADL)
ADLS_ACUITY_SCORE: 41
ADLS_ACUITY_SCORE: 38
ADLS_ACUITY_SCORE: 41
ADLS_ACUITY_SCORE: 38
ADLS_ACUITY_SCORE: 38
ADLS_ACUITY_SCORE: 41
ADLS_ACUITY_SCORE: 38
ADLS_ACUITY_SCORE: 41
ADLS_ACUITY_SCORE: 38

## 2025-05-25 NOTE — PROGRESS NOTES
"[facility]    Medicine Progress Note - Hospitalist Service    Date of Admission:  5/22/2025    Assessment & Plan   59-year-old female with obesity, mood disorder and history of diverticulitis with perforation that required IR placement of drainage tube admitted by general surgery after planned sigmoid colectomy with splenic flexure takedown.  [facility] consulted for medical management.    #Diverticulitis s/p sigmoidectomy 5/22  Gentle IV fluids  PPI  Pain control    #Acute respiratory failure with hypoxia  CXR unremarkable  Continue I-S, flutter valve and encourage ambulation  Wean NC as tolerated    #Hypertension  #Hair loss  PTA minoxidil  As needed hydralazine    #Mood disorder  PTA Effexor    #Obesity  PTA Wegovy on hold      VTE PPx: SC heparin        Diet: Mechanical/Dental Soft Diet    Curiel Catheter: Not present  Lines: None     Cardiac Monitoring: None  Code Status: Full Code      Clinically Significant Risk Factors               # Hypoalbuminemia: Lowest albumin = 3.3 g/dL at 5/25/2025  6:37 AM, will monitor as appropriate     # Hypertension: Noted on problem list            # Obesity: Estimated body mass index is 38.04 kg/m  as calculated from the following:    Height as of this encounter: 1.575 m (5' 2\").    Weight as of this encounter: 94.3 kg (208 lb)., PRESENT ON ADMISSION            Social Drivers of Health    Tobacco Use: High Risk (5/22/2025)    Patient History     Smoking Tobacco Use: Every Day     Smokeless Tobacco Use: Never          Disposition Plan     Medically Ready for Discharge: Anticipated Tomorrow             Edu Galvan DO  Hospitalist Service  [facility]  Securely message with Tammy (more info)  Text page via [employer] Paging/Directory   ______________________________________________________________________    Interval History   Passing small amounts of gas.  Has been ambulating in the tao.    Physical Exam   Vital Signs: Temp: 97.7  F " (36.5  C) Temp src: Oral BP: 127/63 Pulse: 77   Resp: 14 SpO2: 97 % O2 Device: None (Room air)    Weight: 208 lbs 0 oz    General Appearance:  No acute distress  Respiratory: Clear to auscultation bilaterally  Cardiovascular: Regular rate and rhythm  GI: Normal bowel sounds, abdomen is soft with no rebound  Neuro: Alert and oriented x 3, normal speech    Medical Decision Making       50 MINUTES SPENT BY ME on the date of service doing chart review, history, exam, documentation & further activities per the note.      Data

## 2025-05-25 NOTE — PLAN OF CARE
Pain a little better today, 5mg oxy given and effective at bringing pain from 7/10 to 4/10. Ate a pancake and some scrambled eggs and was feeling fine after. Passing a lot of gas after eating. Fluid intake and incentive spirometer encouraged.

## 2025-05-25 NOTE — PLAN OF CARE
Problem: Comorbidity Management  Goal: Blood Pressure in Desired Range  Intervention: Maintain Blood Pressure Management  Recent Flowsheet Documentation  Taken 5/24/2025 1941 by Amna Horvath RN  Medication Review/Management: medications reviewed     Problem: Infection  Goal: Absence of Infection Signs and Symptoms  Outcome: Progressing     Problem: Pain Acute  Goal: Optimal Pain Control and Function  Intervention: Optimize Psychosocial Wellbeing  Recent Flowsheet Documentation  Taken 5/24/2025 1941 by Amna Horvath RN  Diversional Activities: television  Intervention: Develop Pain Management Plan  Recent Flowsheet Documentation  Taken 5/24/2025 1941 by Amna Horvath RN  Pain Management Interventions:   rest   emotional support   care clustered  Intervention: Prevent or Manage Pain  Recent Flowsheet Documentation  Taken 5/24/2025 1941 by Amna Horvath RN  Medication Review/Management: medications reviewed     Problem: Surgery Nonspecified  Goal: Effective Urinary Elimination  Outcome: Progressing   Goal Outcome Evaluation:      Patient is A/O x4. VSS on RA. Pt endorses 7/10 abdominal pain reduced to 4/10 with one10 mg dose of prn oxy; denies nausea, infrequent productive cough; passes gas, constipated with no BM since 5/22, refused milk of magnesia; Up with sby assist. Full liquid diet, tolerating better, ate 2 ice creams NOC shift.

## 2025-05-25 NOTE — PROGRESS NOTES
General Surgery Progress Note:    Hospital Day # 3    ASSESSMENT:  No diagnosis found.    Gia Sultana is a 59 year old female who is s/p Robot assisted laparoscopic sigmoid colectomy with splenic flexure takedown and cystoscopy with bilateral stent placement on 5/22 for h/o perforated diverticulitis.  Hematuria postoperatively with Curiel removal yesterday and patient successfully voiding today.  Tolerated full liquids yesterday, has passed some flatus this morning more consistently will trial a soft diet for more options.    PLAN:  -slow upgrade to soft diet only 1 item at a time if possible  - Continue increased activity ambulation 3 times daily  - Continue abdominal drain, patient will discharge with this please continue stripping  - Follow-up appointment for drain check and staple removal already scheduled    SUBJECTIVE:   Gia Sultana was seen on rounds.  Is feeling okay, is finding some things to eat on the full liquid diet.  Is able to void after the Curiel removal yesterday and denies any hematuria or burning or other dysuria symptoms.  Denies fever or chills or nausea or vomiting.  Is ambulating in the halls per patient.  Has not have a bowel movement postoperatively but is more consistently hearing her bowel sounds and is passing some flatus    VITALS RANGE:  Temp:  [97.7  F (36.5  C)-98.7  F (37.1  C)] 97.7  F (36.5  C)  Pulse:  [77-89] 77  Resp:  [14-16] 14  BP: (127-145)/(60-87) 127/63  SpO2:  [92 %-97 %] 97 %    PHYSICAL EXAM:  General: patient seen resting in bed in no acute distress  Resp: no increased work of breathing, breathing comfortably on room air  Abdomen: Generally soft and minimally tender postoperatively with incisions clean, dry, intact with staples in place, little bit of darkened tissue on the inferior portion of the Pfannenstiel incision but does not look overtly necrotic or infectious appearing  Drains: Sanguinous in nature  Output by Drain (mL) 05/23/25 0700 - 05/23/25 7502  05/23/25 1500 - 05/23/25 2259 05/23/25 2300 - 05/24/25 0659 05/24/25 0700 - 05/24/25 1459 05/24/25 1500 - 05/24/25 2259 05/24/25 2300 - 05/25/25 0659 05/25/25 0700 - 05/25/25 0941   Drain Closed/Suction 1 Lateral RLQ Bulb 19 [place] 35 60 40 40 25 35       Extremities: No edema or cyanosis visualized on exam, no obvious deformities    05/24 0700 - 05/25 0659  In: 891 [P.O.:480; I.V.:411]  Out: 975 [Urine:875; Drains:100]    Admission on 05/22/2025   Component Date Value    GLUCOSE BY METER POCT 05/22/2025 155 (H)     Sodium 05/23/2025 140     Potassium 05/23/2025 4.0     Chloride 05/23/2025 105     Carbon Dioxide (CO2) 05/23/2025 27     Anion Gap 05/23/2025 8     Urea Nitrogen 05/23/2025 12.7     Creatinine 05/23/2025 0.90     GFR Estimate 05/23/2025 73     Calcium 05/23/2025 8.2 (L)     Glucose 05/23/2025 99     Magnesium 05/23/2025 1.7     Phosphorus 05/23/2025 3.6     WBC Count 05/23/2025 11.5 (H)     RBC Count 05/23/2025 4.60     Hemoglobin 05/23/2025 12.8     Hematocrit 05/23/2025 38.8     MCV 05/23/2025 84     MCH 05/23/2025 27.8     MCHC 05/23/2025 33.0     RDW 05/23/2025 13.4     Platelet Count 05/23/2025 288     GLUCOSE BY METER POCT 05/23/2025 106 (H)     WBC Count 05/24/2025 13.0 (H)     RBC Count 05/24/2025 4.75     Hemoglobin 05/24/2025 12.8     Hematocrit 05/24/2025 41.0     MCV 05/24/2025 86     MCH 05/24/2025 26.9     MCHC 05/24/2025 31.2 (L)     RDW 05/24/2025 13.4     Platelet Count 05/24/2025 313     GLUCOSE BY METER POCT 05/24/2025 96     Hold Specimen 05/24/2025 JIC     Sodium 05/24/2025 138     Potassium 05/24/2025 3.8     Chloride 05/24/2025 102     Carbon Dioxide (CO2) 05/24/2025 29     Anion Gap 05/24/2025 7     Glucose 05/24/2025 94     Urea Nitrogen 05/24/2025 10.6     Creatinine 05/24/2025 0.81     GFR Estimate 05/24/2025 83     Calcium 05/24/2025 9.0     Albumin 05/24/2025 3.4 (L)     Phosphorus 05/24/2025 2.2 (L)     Magnesium 05/24/2025 1.7     Culture 05/24/2025 Culture in progress      Gram Stain Result 05/24/2025 <10 Squamous epithelial cells/low power field     Gram Stain Result 05/24/2025 <25 PMNs/low power field     Gram Stain Result 05/24/2025 3+ Mixed jorje     Procalcitonin 05/24/2025 0.28     Sodium 05/25/2025 140     Potassium 05/25/2025 3.7     Chloride 05/25/2025 103     Carbon Dioxide (CO2) 05/25/2025 33 (H)     Anion Gap 05/25/2025 4 (L)     Glucose 05/25/2025 101 (H)     Urea Nitrogen 05/25/2025 9.4     Creatinine 05/25/2025 0.85     GFR Estimate 05/25/2025 78     Calcium 05/25/2025 9.0     Albumin 05/25/2025 3.3 (L)     Phosphorus 05/25/2025 2.8     Magnesium 05/25/2025 1.7     WBC Count 05/25/2025 10.9     RBC Count 05/25/2025 4.63     Hemoglobin 05/25/2025 12.5     Hematocrit 05/25/2025 40.0     MCV 05/25/2025 86     MCH 05/25/2025 27.0     MCHC 05/25/2025 31.3 (L)     RDW 05/25/2025 13.4     Platelet Count 05/25/2025 304     % Neutrophils 05/25/2025 61     % Lymphocytes 05/25/2025 27     % Monocytes 05/25/2025 6     % Eosinophils 05/25/2025 5     % Basophils 05/25/2025 1     % Immature Granulocytes 05/25/2025 1     NRBCs per 100 WBC 05/25/2025 0     Absolute Neutrophils 05/25/2025 6.6     Absolute Lymphocytes 05/25/2025 2.9     Absolute Monocytes 05/25/2025 0.7     Absolute Eosinophils 05/25/2025 0.5     Absolute Basophils 05/25/2025 0.1     Absolute Immature Granul* 05/25/2025 0.1     Absolute NRBCs 05/25/2025 0.0         Katharine Bernardo PA-C  [facility]  [address],  [place] [address]  [facility] [phone]

## 2025-05-26 VITALS
WEIGHT: 208 LBS | DIASTOLIC BLOOD PRESSURE: 65 MMHG | HEART RATE: 88 BPM | BODY MASS INDEX: 38.28 KG/M2 | TEMPERATURE: 98.4 F | OXYGEN SATURATION: 95 % | HEIGHT: 62 IN | RESPIRATION RATE: 16 BRPM | SYSTOLIC BLOOD PRESSURE: 152 MMHG

## 2025-05-26 LAB
BACTERIA SPT CULT: NORMAL
GLUCOSE BLDC GLUCOMTR-MCNC: 102 MG/DL (ref 70–99)
GRAM STAIN RESULT: NORMAL

## 2025-05-26 PROCEDURE — 250N000011 HC RX IP 250 OP 636: Performed by: SURGERY

## 2025-05-26 PROCEDURE — 250N000013 HC RX MED GY IP 250 OP 250 PS 637: Performed by: SURGERY

## 2025-05-26 PROCEDURE — 999N000157 HC STATISTIC RCP TIME EA 10 MIN

## 2025-05-26 PROCEDURE — 94799 UNLISTED PULMONARY SVC/PX: CPT

## 2025-05-26 PROCEDURE — 99238 HOSP IP/OBS DSCHRG MGMT 30/<: CPT | Mod: 24

## 2025-05-26 PROCEDURE — 250N000013 HC RX MED GY IP 250 OP 250 PS 637: Performed by: STUDENT IN AN ORGANIZED HEALTH CARE EDUCATION/TRAINING PROGRAM

## 2025-05-26 RX ORDER — POLYETHYLENE GLYCOL 3350 17 G/17G
1 POWDER, FOR SOLUTION ORAL DAILY PRN
COMMUNITY
Start: 2025-05-26

## 2025-05-26 RX ORDER — IBUPROFEN 200 MG
400-600 TABLET ORAL EVERY 6 HOURS PRN
COMMUNITY
Start: 2025-05-26

## 2025-05-26 RX ORDER — OXYCODONE HYDROCHLORIDE 5 MG/1
2.5-5 TABLET ORAL EVERY 6 HOURS PRN
Qty: 10 TABLET | Refills: 0 | Status: ON HOLD | OUTPATIENT
Start: 2025-05-26 | End: 2025-06-06

## 2025-05-26 RX ADMIN — PANTOPRAZOLE SODIUM 40 MG: 40 TABLET, DELAYED RELEASE ORAL at 07:00

## 2025-05-26 RX ADMIN — VENLAFAXINE HYDROCHLORIDE 75 MG: 75 CAPSULE, EXTENDED RELEASE ORAL at 09:12

## 2025-05-26 RX ADMIN — OXYCODONE HYDROCHLORIDE 5 MG: 5 TABLET ORAL at 11:03

## 2025-05-26 RX ADMIN — ACETAMINOPHEN 975 MG: 325 TABLET ORAL at 01:00

## 2025-05-26 RX ADMIN — MINOXIDIL 1.25 MG: 2.5 TABLET ORAL at 07:44

## 2025-05-26 RX ADMIN — ACETAMINOPHEN 975 MG: 325 TABLET ORAL at 07:43

## 2025-05-26 RX ADMIN — CETIRIZINE HYDROCHLORIDE 10 MG: 10 TABLET, FILM COATED ORAL at 09:12

## 2025-05-26 RX ADMIN — HEPARIN SODIUM 5000 UNITS: 5000 INJECTION, SOLUTION INTRAVENOUS; SUBCUTANEOUS at 06:55

## 2025-05-26 ASSESSMENT — ACTIVITIES OF DAILY LIVING (ADL)
ADLS_ACUITY_SCORE: 38
ADLS_ACUITY_SCORE: 40
ADLS_ACUITY_SCORE: 38

## 2025-05-26 NOTE — PLAN OF CARE
Problem: Adult Inpatient Plan of Care  Goal: Plan of Care Review  Description: The Plan of Care Review/Shift note should be completed every shift.  The Outcome Evaluation is a brief statement about your assessment that the patient is improving, declining, or no change.  This information will be displayed automatically on your shift  note.  Outcome: Adequate for Care Transition   Goal Outcome Evaluation:         Pt is ready for discharge to home. Pt and  verbalize understanding discharge instructions, demonstrated ability to empty the BRITT drain. Pt states she has all of her personal items and is in agreement for discharge. Sent a filled prescriptions from [facility].

## 2025-05-26 NOTE — PLAN OF CARE
Problem: Surgery Nonspecified  Goal: Optimal Pain Control and Function  Intervention: Prevent or Manage Pain  Recent Flowsheet Documentation  Taken 5/25/2025 1800 by Felipe Rangel, RN  Pain Management Interventions:   breathing exercises   care clustered   distraction   emotional support   pillow support provided   quiet environment facilitated   relaxation techniques promoted   repositioned   rest   therapeutic presence  Taken 5/25/2025 1700 by Felipe Rangel, RN  Pain Management Interventions:   medication (see MAR)   breathing exercises   care clustered   distraction   emotional support   pillow support provided   quiet environment facilitated   relaxation techniques promoted   repositioned   rest   therapeutic presence     Problem: Infection  Goal: Absence of Infection Signs and Symptoms  Outcome: Progressing   Goal Outcome Evaluation:       VSS on RA. A&Ox4. Pain in abdomen managed w/ sched tylenol & PRN oxy. Up w/ 1. IVF DC'd. Tolerating current diet. BRITT drain w/ serosang OP. Lap sites CDI. Had a BM. Ambulated hallway x1. Daughter visiting at bedside.

## 2025-05-26 NOTE — PLAN OF CARE
Problem: Pain Acute  Goal: Optimal Pain Control and Function  Intervention: Optimize Psychosocial Wellbeing  Recent Flowsheet Documentation  Taken 5/25/2025 2315 by Amna Horvath RN  Diversional Activities: television  Intervention: Prevent or Manage Pain  Recent Flowsheet Documentation  Taken 5/25/2025 2315 by Amna Horvath RN  Medication Review/Management: medications reviewed     Problem: Surgery Nonspecified  Goal: Absence of Infection Signs and Symptoms  Outcome: Progressing     Problem: Surgery Nonspecified  Goal: Effective Bowel Elimination  Outcome: Progressing     Problem: Surgery Nonspecified  Goal: Absence of Bleeding  Outcome: Progressing   Goal Outcome Evaluation:       Patient is A/O x4. VSS on RA. Pt endorses 3/10 abdominal pain, scheduled Tylenol effective; denies nausea, infrequent productive cough; passes gas; Up with sby assist. Soft mechanical diet, tolerating better; pt reports clear yellow urine, improved since yesterday; serosanguinous Melvin output.

## 2025-05-26 NOTE — PROGRESS NOTES
General Surgery Progress Note:    Hospital Day # 4    ASSESSMENT:  1. Diverticular disease of large intestine        Gia Sultana is a 59 year old female who is s/p Robot assisted laparoscopic sigmoid colectomy with splenic flexure takedown and cystoscopy with bilateral stent placement on 5/22 for h/o perforated diverticulitis.  Hematuria postoperatively with Curiel removal yesterday and patient successfully voiding without continued hematuria, Tolerating soft diet, passed flatus and had BMs. Will advance to low fiber and likely discharge to home today.      PLAN:  -Low fiber diet  -Continue ambulation and movement; no lifting greater than 10-15 pounds until 2 weeks postop  -Continue abdominal drain on discharge, continue stripping; please provide small cup for drainage at home for patient  -Follow-up scheduled  -Okay for discharge today    SUBJECTIVE:   Gia Sultana was seen on rounds. Is feeling well and passing flatus tolerating diet and had eggs and toast this morning. Feeling well and pain managed on PO medications. Is comfortable with going home. Discussed drain and patient comfortable. No severe pains or fever or chills or nausea.     VITALS RANGE:  Temp:  [97.4  F (36.3  C)-98.4  F (36.9  C)] 98.4  F (36.9  C)  Pulse:  [81-88] 88  Resp:  [16] 16  BP: (134-152)/(65-80) 152/65  SpO2:  [94 %-95 %] 95 %    PHYSICAL EXAM:  General: patient seen resting in bed in no acute distress  Resp: no increased work of breathing, breathing comfortably on room air  Abdomen: generally soft and some tenderness with palpation over the abdomen but relatively mild and no guarding no peritoneal signs. Incisions clean, dry, intact with staples in place  Drains: serosanguinous output in the drain with bulb to suction   Output by Drain (mL) 05/24/25 0700 - 05/24/25 1459 05/24/25 1500 - 05/24/25 2259 05/24/25 2300 - 05/25/25 0659 05/25/25 0700 - 05/25/25 1459 05/25/25 1500 - 05/25/25 2259 05/25/25 2300 - 05/26/25 0659 05/26/25  0700 - 05/26/25 0946   Drain Closed/Suction 1 Lateral RLQ Bulb 19 [place] 40 25 35 40 35 30       Extremities: No edema or cyanosis visualized on exam, no obvious deformities    05/25 0700 - 05/26 0659  In: 560 [P.O.:560]  Out: 385 [Urine:280; Drains:105]    Admission on 05/22/2025   Component Date Value    GLUCOSE BY METER POCT 05/22/2025 155 (H)     Sodium 05/23/2025 140     Potassium 05/23/2025 4.0     Chloride 05/23/2025 105     Carbon Dioxide (CO2) 05/23/2025 27     Anion Gap 05/23/2025 8     Urea Nitrogen 05/23/2025 12.7     Creatinine 05/23/2025 0.90     GFR Estimate 05/23/2025 73     Calcium 05/23/2025 8.2 (L)     Glucose 05/23/2025 99     Magnesium 05/23/2025 1.7     Phosphorus 05/23/2025 3.6     WBC Count 05/23/2025 11.5 (H)     RBC Count 05/23/2025 4.60     Hemoglobin 05/23/2025 12.8     Hematocrit 05/23/2025 38.8     MCV 05/23/2025 84     MCH 05/23/2025 27.8     MCHC 05/23/2025 33.0     RDW 05/23/2025 13.4     Platelet Count 05/23/2025 288     GLUCOSE BY METER POCT 05/23/2025 106 (H)     WBC Count 05/24/2025 13.0 (H)     RBC Count 05/24/2025 4.75     Hemoglobin 05/24/2025 12.8     Hematocrit 05/24/2025 41.0     MCV 05/24/2025 86     MCH 05/24/2025 26.9     MCHC 05/24/2025 31.2 (L)     RDW 05/24/2025 13.4     Platelet Count 05/24/2025 313     GLUCOSE BY METER POCT 05/24/2025 96     Hold Specimen 05/24/2025 JIC     Sodium 05/24/2025 138     Potassium 05/24/2025 3.8     Chloride 05/24/2025 102     Carbon Dioxide (CO2) 05/24/2025 29     Anion Gap 05/24/2025 7     Glucose 05/24/2025 94     Urea Nitrogen 05/24/2025 10.6     Creatinine 05/24/2025 0.81     GFR Estimate 05/24/2025 83     Calcium 05/24/2025 9.0     Albumin 05/24/2025 3.4 (L)     Phosphorus 05/24/2025 2.2 (L)     Magnesium 05/24/2025 1.7     Culture 05/24/2025 Culture in progress     Gram Stain Result 05/24/2025 <10 Squamous epithelial cells/low power field     Gram Stain Result 05/24/2025 <25 PMNs/low power field     Gram Stain Result 05/24/2025 3+  Mixed jorje     Procalcitonin 05/24/2025 0.28     Sodium 05/25/2025 140     Potassium 05/25/2025 3.7     Chloride 05/25/2025 103     Carbon Dioxide (CO2) 05/25/2025 33 (H)     Anion Gap 05/25/2025 4 (L)     Glucose 05/25/2025 101 (H)     Urea Nitrogen 05/25/2025 9.4     Creatinine 05/25/2025 0.85     GFR Estimate 05/25/2025 78     Calcium 05/25/2025 9.0     Albumin 05/25/2025 3.3 (L)     Phosphorus 05/25/2025 2.8     Magnesium 05/25/2025 1.7     WBC Count 05/25/2025 10.9     RBC Count 05/25/2025 4.63     Hemoglobin 05/25/2025 12.5     Hematocrit 05/25/2025 40.0     MCV 05/25/2025 86     MCH 05/25/2025 27.0     MCHC 05/25/2025 31.3 (L)     RDW 05/25/2025 13.4     Platelet Count 05/25/2025 304     % Neutrophils 05/25/2025 61     % Lymphocytes 05/25/2025 27     % Monocytes 05/25/2025 6     % Eosinophils 05/25/2025 5     % Basophils 05/25/2025 1     % Immature Granulocytes 05/25/2025 1     NRBCs per 100 WBC 05/25/2025 0     Absolute Neutrophils 05/25/2025 6.6     Absolute Lymphocytes 05/25/2025 2.9     Absolute Monocytes 05/25/2025 0.7     Absolute Eosinophils 05/25/2025 0.5     Absolute Basophils 05/25/2025 0.1     Absolute Immature Granul* 05/25/2025 0.1     Absolute NRBCs 05/25/2025 0.0     GLUCOSE BY METER POCT 05/26/2025 102 (H)         Katharine Bernardo PA-C  [place] [id number] [address] [address]  [facility] [phone]

## 2025-05-26 NOTE — DISCHARGE SUMMARY
Discharge Provider: Katharine Bernardo PA-C  Admission Date: 5/22/2025  Discharge Date: May 26, 2025     Primary Diagnosis at Discharge:   Patient Active Problem List   Diagnosis    Female stress incontinence    Generalized anxiety disorder    Allergies    Dyslipidemia    Hypertension    Low serum vitamin D    Hepatic cyst    Renal cyst    Class 2 severe obesity due to excess calories with serious comorbidity and body mass index (BMI) of 39.0 to 39.9 in adult (H)    Gastroesophageal reflux disease without esophagitis    Benign neoplasm of ascending colon    Diverticular disease of large intestine    Colon adenomas    Diverticulitis large intestine      Disposition: Home or Self Care  Condition at Discharge: Stable     Surgery: Robot assisted laparoscopic sigmoid colectomy with splenic flexure takedown and bilateral ureteral stent insertion with cystoscopy    Hospital Summary:   Gia Sultana was admitted for sigmoid colectomy for previous perforated diverticulitis and underwent an uncomplicated Robot assisted laparoscopic sigmoid colectomy with splenic flexure takedown and bilateral ureteral stent insertion with cystoscopy.  Following a brief recovery in the PACU, She was transferred to the Med/Surg floor for the remainder of Her stay.  Her post operative course was uncomplicated with slow advancement of diet to low fiber. Hospital medicine followed the patient while inpatient and monitored for hypoxia. Patient was discharged without oxygen. On POD # 4 She was discharged home tolerating a general diet, ambulating without assistance, and pain controlled with oral analgesics.        Pathology results pending at time of discharge    Discharge Medications:      Medication List        Started      ibuprofen 200 MG tablet  Commonly known as: ADVIL/MOTRIN  400-600 mg, Oral, EVERY 6 HOURS PRN     oxyCODONE 5 MG tablet  Commonly known as: ROXICODONE  2.5-5 mg, Oral, EVERY 6 HOURS PRN     polyethylene glycol 17 GM/Dose  powder  Commonly known as: MIRALAX  1 Capful, Oral, DAILY PRN              Exam:  General appearance: patient seen resting in bed in no acute distress  Resp: no respiratory distress or increased work of breathing  Abdomen: soft, non-tender, non-distended upon palpation. Incisions clean/dry/intact staples in place  Drains: serosanguinous output in the drain with bulb to suction  Extremities: warm and well-perfused.    Discharge Instructions:  Follow up appointment with Primary Care Physician: Mansi Mccracken  Follow up appointment with General Surgery in: 2-3 weeks    If you would like a scheduled follow up appointment in clinic, please call us at [phone] to schedule an appointment at your convenience.     Post operative instructions:   Diet: Low fiber/low residue diet    Activity: You should continue to be active at home including walking frequently.  If possible limit the amount of time spent in bed.    Restrictions: You should avoid lifting anything more than 20 pounds or strenuous physical activity for a minimum of 2 weeks.  This is to help reduce the risk of hernia formation at your port sites.  Walking does not count as strenuous physical activity.  You are safe to walk up and down stairs.  Following 2 weeks you may resume all normal physical activity.    Wound / drain care: Your wounds have staples in place. These will be removed at your follow-up appointment    Bathing: You may shower after 48 hours from surgery. It is ok to get your incisions wet, but avoid rubbing them. Avoid soaking in bath tubs, or swimming in lakes, pools, or streams for 4 weeks following surgery.      Drain care: You have a surgical drain that will remain in place when you leave the hospital. Please keep a log of the daily output from your drain to bring to your follow-up appointment. Please note any changes in character of your drain output.      JOSH Peterson [facility] General Surgery  [id number]  [facility] [facility] [facility] [address],  [place] [address]  [facility] [phone]

## 2025-05-27 ENCOUNTER — PATIENT OUTREACH (OUTPATIENT)
Dept: FAMILY MEDICINE | Facility: CLINIC | Age: 60
End: 2025-05-27
Payer: COMMERCIAL

## 2025-05-27 DIAGNOSIS — E66.01 CLASS 2 SEVERE OBESITY DUE TO EXCESS CALORIES WITH SERIOUS COMORBIDITY AND BODY MASS INDEX (BMI) OF 39.0 TO 39.9 IN ADULT (H): ICD-10-CM

## 2025-05-27 DIAGNOSIS — I10 ESSENTIAL HYPERTENSION: ICD-10-CM

## 2025-05-27 DIAGNOSIS — E66.812 CLASS 2 SEVERE OBESITY DUE TO EXCESS CALORIES WITH SERIOUS COMORBIDITY AND BODY MASS INDEX (BMI) OF 39.0 TO 39.9 IN ADULT (H): ICD-10-CM

## 2025-05-27 DIAGNOSIS — E78.5 DYSLIPIDEMIA: ICD-10-CM

## 2025-05-27 DIAGNOSIS — E88.810 METABOLIC SYNDROME X: ICD-10-CM

## 2025-05-27 LAB
PATH REPORT.COMMENTS IMP SPEC: NORMAL
PATH REPORT.COMMENTS IMP SPEC: NORMAL
PATH REPORT.FINAL DX SPEC: NORMAL
PATH REPORT.GROSS SPEC: NORMAL
PATH REPORT.MICROSCOPIC SPEC OTHER STN: NORMAL
PATH REPORT.RELEVANT HX SPEC: NORMAL
PHOTO IMAGE: NORMAL

## 2025-05-27 NOTE — TELEPHONE ENCOUNTER
Transitions of Care Outreach  Chief Complaint   Patient presents with    Hospital F/U       Most Recent Admission Date: 5/22/2025   Most Recent Admission Diagnosis: Diverticulitis - K57.92  Colostomy in place (H) - Z93.3  Colostomy in place (H) - Z93.3  Diverticulitis large intestine - K57.32     Most Recent Discharge Date: 5/26/2025   Most Recent Discharge Diagnosis: Diverticular disease of large intestine - K57.30     Transitions of Care Assessment    Discharge Assessment  How are you doing now that you are home?: feeling about the same, meds help with pain  How are your symptoms? (Red Flag symptoms escalate to triage hotline per guidelines): Unchanged  Do you know how to contact your clinic care team if you have future questions or changes to your health status? : Yes  Does the patient have their discharge instructions? : Yes  Does the patient have questions regarding their discharge instructions? : No  Were you started on any new medications or were there changes to any of your previous medications? : Yes  Does the patient have all of their medications?: Yes  Do you have questions regarding any of your medications? : No    Follow up Plan     Discharge Follow-Up  Discharge follow up appointment scheduled in alignment with recommended follow up timeframe or Transitions of Risk Category? (Low = within 30 days; Moderate= within 14 days; High= within 7 days): Yes  Discharge Follow Up Appointment Date: 06/06/25  Discharge Follow Up Appointment Scheduled with?: Specialty Care Provider    Future Appointments   Date Time Provider Department Center   6/6/2025  2:00 PM Flaco Mendoza DO [facility]       Outpatient Plan as outlined on AVS reviewed with patient.    For any urgent concerns, please contact our 24 hour nurse triage line: [phone] ([phone])       Cele Montiel RN

## 2025-06-01 ENCOUNTER — HOSPITAL ENCOUNTER (INPATIENT)
Facility: HOSPITAL | Age: 60
End: 2025-06-01
Attending: EMERGENCY MEDICINE | Admitting: INTERNAL MEDICINE
Payer: COMMERCIAL

## 2025-06-01 ENCOUNTER — APPOINTMENT (OUTPATIENT)
Dept: CT IMAGING | Facility: HOSPITAL | Age: 60
End: 2025-06-01
Attending: EMERGENCY MEDICINE
Payer: COMMERCIAL

## 2025-06-01 DIAGNOSIS — K57.30 DIVERTICULAR DISEASE OF LARGE INTESTINE: ICD-10-CM

## 2025-06-01 DIAGNOSIS — R10.31 RIGHT LOWER QUADRANT ABDOMINAL PAIN: ICD-10-CM

## 2025-06-01 DIAGNOSIS — R18.8 INTRA-ABDOMINAL FLUID COLLECTION: ICD-10-CM

## 2025-06-01 DIAGNOSIS — K57.20 DIVERTICULITIS OF LARGE INTESTINE WITH PERFORATION AND ABSCESS, UNSPECIFIED BLEEDING STATUS: Primary | ICD-10-CM

## 2025-06-01 LAB
ALBUMIN SERPL BCG-MCNC: 3.6 G/DL (ref 3.5–5.2)
ALP SERPL-CCNC: 106 U/L (ref 40–150)
ALT SERPL W P-5'-P-CCNC: 26 U/L (ref 0–50)
ANION GAP SERPL CALCULATED.3IONS-SCNC: 13 MMOL/L (ref 7–15)
AST SERPL W P-5'-P-CCNC: 27 U/L (ref 0–45)
BASOPHILS # BLD AUTO: 0.1 10E3/UL (ref 0–0.2)
BASOPHILS NFR BLD AUTO: 1 %
BILIRUB DIRECT SERPL-MCNC: <0.08 MG/DL (ref 0–0.3)
BILIRUB SERPL-MCNC: 0.4 MG/DL
BUN SERPL-MCNC: 12.6 MG/DL (ref 8–23)
CALCIUM SERPL-MCNC: 9.6 MG/DL (ref 8.8–10.4)
CHLORIDE SERPL-SCNC: 97 MMOL/L (ref 98–107)
CREAT SERPL-MCNC: 0.63 MG/DL (ref 0.51–0.95)
EGFRCR SERPLBLD CKD-EPI 2021: >90 ML/MIN/1.73M2
EOSINOPHIL # BLD AUTO: 0.3 10E3/UL (ref 0–0.7)
EOSINOPHIL NFR BLD AUTO: 2 %
ERYTHROCYTE [DISTWIDTH] IN BLOOD BY AUTOMATED COUNT: 14 % (ref 10–15)
GLUCOSE SERPL-MCNC: 103 MG/DL (ref 70–99)
HCO3 SERPL-SCNC: 27 MMOL/L (ref 22–29)
HCT VFR BLD AUTO: 43 % (ref 35–47)
HGB BLD-MCNC: 14.3 G/DL (ref 11.7–15.7)
IMM GRANULOCYTES # BLD: 0.1 10E3/UL
IMM GRANULOCYTES NFR BLD: 1 %
LACTATE SERPL-SCNC: 1.6 MMOL/L (ref 0.7–2)
LIPASE SERPL-CCNC: 23 U/L (ref 13–60)
LYMPHOCYTES # BLD AUTO: 2.8 10E3/UL (ref 0.8–5.3)
LYMPHOCYTES NFR BLD AUTO: 17 %
MAGNESIUM SERPL-MCNC: 1.5 MG/DL (ref 1.7–2.3)
MCH RBC QN AUTO: 27.6 PG (ref 26.5–33)
MCHC RBC AUTO-ENTMCNC: 33.3 G/DL (ref 31.5–36.5)
MCV RBC AUTO: 83 FL (ref 78–100)
MONOCYTES # BLD AUTO: 0.9 10E3/UL (ref 0–1.3)
MONOCYTES NFR BLD AUTO: 5 %
NEUTROPHILS # BLD AUTO: 12.3 10E3/UL (ref 1.6–8.3)
NEUTROPHILS NFR BLD AUTO: 75 %
NRBC # BLD AUTO: 0 10E3/UL
NRBC BLD AUTO-RTO: 0 /100
PLATELET # BLD AUTO: 511 10E3/UL (ref 150–450)
POTASSIUM SERPL-SCNC: 4 MMOL/L (ref 3.4–5.3)
PROT SERPL-MCNC: 6.8 G/DL (ref 6.4–8.3)
RBC # BLD AUTO: 5.19 10E6/UL (ref 3.8–5.2)
SODIUM SERPL-SCNC: 137 MMOL/L (ref 135–145)
WBC # BLD AUTO: 16.5 10E3/UL (ref 4–11)

## 2025-06-01 PROCEDURE — 83735 ASSAY OF MAGNESIUM: CPT | Performed by: INTERNAL MEDICINE

## 2025-06-01 PROCEDURE — 96375 TX/PRO/DX INJ NEW DRUG ADDON: CPT

## 2025-06-01 PROCEDURE — 258N000003 HC RX IP 258 OP 636: Performed by: INTERNAL MEDICINE

## 2025-06-01 PROCEDURE — 36415 COLL VENOUS BLD VENIPUNCTURE: CPT | Performed by: INTERNAL MEDICINE

## 2025-06-01 PROCEDURE — 250N000011 HC RX IP 250 OP 636: Performed by: EMERGENCY MEDICINE

## 2025-06-01 PROCEDURE — 96361 HYDRATE IV INFUSION ADD-ON: CPT

## 2025-06-01 PROCEDURE — 80048 BASIC METABOLIC PNL TOTAL CA: CPT | Performed by: EMERGENCY MEDICINE

## 2025-06-01 PROCEDURE — 83690 ASSAY OF LIPASE: CPT | Performed by: EMERGENCY MEDICINE

## 2025-06-01 PROCEDURE — 84155 ASSAY OF PROTEIN SERUM: CPT | Performed by: EMERGENCY MEDICINE

## 2025-06-01 PROCEDURE — 96376 TX/PRO/DX INJ SAME DRUG ADON: CPT

## 2025-06-01 PROCEDURE — 99223 1ST HOSP IP/OBS HIGH 75: CPT | Performed by: INTERNAL MEDICINE

## 2025-06-01 PROCEDURE — 99285 EMERGENCY DEPT VISIT HI MDM: CPT | Mod: 25

## 2025-06-01 PROCEDURE — 120N000001 HC R&B MED SURG/OB

## 2025-06-01 PROCEDURE — 74177 CT ABD & PELVIS W/CONTRAST: CPT

## 2025-06-01 PROCEDURE — 83735 ASSAY OF MAGNESIUM: CPT | Performed by: EMERGENCY MEDICINE

## 2025-06-01 PROCEDURE — 258N000003 HC RX IP 258 OP 636: Performed by: EMERGENCY MEDICINE

## 2025-06-01 PROCEDURE — 36415 COLL VENOUS BLD VENIPUNCTURE: CPT | Performed by: EMERGENCY MEDICINE

## 2025-06-01 PROCEDURE — 83605 ASSAY OF LACTIC ACID: CPT | Performed by: EMERGENCY MEDICINE

## 2025-06-01 PROCEDURE — 85014 HEMATOCRIT: CPT | Performed by: EMERGENCY MEDICINE

## 2025-06-01 PROCEDURE — 250N000011 HC RX IP 250 OP 636: Performed by: INTERNAL MEDICINE

## 2025-06-01 PROCEDURE — 96365 THER/PROPH/DIAG IV INF INIT: CPT

## 2025-06-01 PROCEDURE — 85004 AUTOMATED DIFF WBC COUNT: CPT | Performed by: EMERGENCY MEDICINE

## 2025-06-01 RX ORDER — LIDOCAINE 40 MG/G
CREAM TOPICAL
Status: ACTIVE | OUTPATIENT
Start: 2025-06-01

## 2025-06-01 RX ORDER — CETIRIZINE HYDROCHLORIDE 10 MG/1
10 TABLET ORAL EVERY EVENING
Status: DISPENSED | OUTPATIENT
Start: 2025-06-02

## 2025-06-01 RX ORDER — ACETAMINOPHEN 325 MG/1
650 TABLET ORAL EVERY 4 HOURS PRN
Status: DISPENSED | OUTPATIENT
Start: 2025-06-01

## 2025-06-01 RX ORDER — MAGNESIUM SULFATE HEPTAHYDRATE 40 MG/ML
2 INJECTION, SOLUTION INTRAVENOUS ONCE
Status: COMPLETED | OUTPATIENT
Start: 2025-06-01 | End: 2025-06-01

## 2025-06-01 RX ORDER — ONDANSETRON 4 MG/1
4 TABLET, ORALLY DISINTEGRATING ORAL EVERY 6 HOURS PRN
Status: ACTIVE | OUTPATIENT
Start: 2025-06-01

## 2025-06-01 RX ORDER — HYDROMORPHONE HYDROCHLORIDE 1 MG/ML
0.5 INJECTION, SOLUTION INTRAMUSCULAR; INTRAVENOUS; SUBCUTANEOUS EVERY 4 HOURS PRN
Status: DISCONTINUED | OUTPATIENT
Start: 2025-06-01 | End: 2025-06-02

## 2025-06-01 RX ORDER — NALOXONE HYDROCHLORIDE 0.4 MG/ML
0.2 INJECTION, SOLUTION INTRAMUSCULAR; INTRAVENOUS; SUBCUTANEOUS
Status: ACTIVE | OUTPATIENT
Start: 2025-06-01

## 2025-06-01 RX ORDER — CALCIUM CARBONATE 500 MG/1
1000 TABLET, CHEWABLE ORAL 4 TIMES DAILY PRN
Status: ACTIVE | OUTPATIENT
Start: 2025-06-01

## 2025-06-01 RX ORDER — ONDANSETRON 2 MG/ML
INJECTION INTRAMUSCULAR; INTRAVENOUS
Status: COMPLETED
Start: 2025-06-01 | End: 2025-06-01

## 2025-06-01 RX ORDER — NALOXONE HYDROCHLORIDE 0.4 MG/ML
0.4 INJECTION, SOLUTION INTRAMUSCULAR; INTRAVENOUS; SUBCUTANEOUS
Status: ACTIVE | OUTPATIENT
Start: 2025-06-01

## 2025-06-01 RX ORDER — PANTOPRAZOLE SODIUM 40 MG/1
40 TABLET, DELAYED RELEASE ORAL EVERY EVENING
Status: DISPENSED | OUTPATIENT
Start: 2025-06-02

## 2025-06-01 RX ORDER — HYDROMORPHONE HYDROCHLORIDE 1 MG/ML
0.5 INJECTION, SOLUTION INTRAMUSCULAR; INTRAVENOUS; SUBCUTANEOUS ONCE
Status: COMPLETED | OUTPATIENT
Start: 2025-06-01 | End: 2025-06-01

## 2025-06-01 RX ORDER — ACETAMINOPHEN 325 MG/1
650 TABLET ORAL ONCE
Status: DISCONTINUED | OUTPATIENT
Start: 2025-06-01 | End: 2025-06-01

## 2025-06-01 RX ORDER — ONDANSETRON 2 MG/ML
4 INJECTION INTRAMUSCULAR; INTRAVENOUS EVERY 6 HOURS PRN
Status: DISPENSED | OUTPATIENT
Start: 2025-06-01

## 2025-06-01 RX ORDER — CIPROFLOXACIN 2 MG/ML
400 INJECTION, SOLUTION INTRAVENOUS ONCE
Status: COMPLETED | OUTPATIENT
Start: 2025-06-01 | End: 2025-06-01

## 2025-06-01 RX ORDER — MAGNESIUM SULFATE 4 G/50ML
4 INJECTION INTRAVENOUS ONCE
Status: DISCONTINUED | OUTPATIENT
Start: 2025-06-01 | End: 2025-06-01

## 2025-06-01 RX ORDER — METRONIDAZOLE 500 MG/100ML
500 INJECTION, SOLUTION INTRAVENOUS ONCE
Status: COMPLETED | OUTPATIENT
Start: 2025-06-01 | End: 2025-06-02

## 2025-06-01 RX ORDER — VENLAFAXINE HYDROCHLORIDE 75 MG/1
75 CAPSULE, EXTENDED RELEASE ORAL EVERY EVENING
Status: DISPENSED | OUTPATIENT
Start: 2025-06-02

## 2025-06-01 RX ORDER — IOPAMIDOL 755 MG/ML
90 INJECTION, SOLUTION INTRAVASCULAR ONCE
Status: COMPLETED | OUTPATIENT
Start: 2025-06-01 | End: 2025-06-01

## 2025-06-01 RX ORDER — SODIUM CHLORIDE 9 MG/ML
INJECTION, SOLUTION INTRAVENOUS CONTINUOUS
Status: DISCONTINUED | OUTPATIENT
Start: 2025-06-01 | End: 2025-06-02

## 2025-06-01 RX ORDER — CIPROFLOXACIN 2 MG/ML
400 INJECTION, SOLUTION INTRAVENOUS EVERY 12 HOURS
Status: DISPENSED | OUTPATIENT
Start: 2025-06-02

## 2025-06-01 RX ORDER — METRONIDAZOLE 500 MG/100ML
500 INJECTION, SOLUTION INTRAVENOUS EVERY 12 HOURS
Status: DISPENSED | OUTPATIENT
Start: 2025-06-02

## 2025-06-01 RX ORDER — ACETAMINOPHEN 650 MG/1
650 SUPPOSITORY RECTAL EVERY 4 HOURS PRN
Status: ACTIVE | OUTPATIENT
Start: 2025-06-01

## 2025-06-01 RX ADMIN — SODIUM CHLORIDE 1000 ML: 0.9 INJECTION, SOLUTION INTRAVENOUS at 17:17

## 2025-06-01 RX ADMIN — HYDROMORPHONE HYDROCHLORIDE 0.5 MG: 1 INJECTION, SOLUTION INTRAMUSCULAR; INTRAVENOUS; SUBCUTANEOUS at 23:51

## 2025-06-01 RX ADMIN — METRONIDAZOLE 500 MG: 500 INJECTION, SOLUTION INTRAVENOUS at 20:47

## 2025-06-01 RX ADMIN — SODIUM CHLORIDE: 0.9 INJECTION, SOLUTION INTRAVENOUS at 22:47

## 2025-06-01 RX ADMIN — IOPAMIDOL 90 ML: 755 INJECTION, SOLUTION INTRAVENOUS at 18:01

## 2025-06-01 RX ADMIN — HYDROMORPHONE HYDROCHLORIDE 0.5 MG: 1 INJECTION, SOLUTION INTRAMUSCULAR; INTRAVENOUS; SUBCUTANEOUS at 19:23

## 2025-06-01 RX ADMIN — CIPROFLOXACIN 400 MG: 400 INJECTION, SOLUTION INTRAVENOUS at 20:47

## 2025-06-01 RX ADMIN — ONDANSETRON 4 MG: 2 INJECTION, SOLUTION INTRAMUSCULAR; INTRAVENOUS at 22:08

## 2025-06-01 RX ADMIN — ONDANSETRON 4 MG: 2 INJECTION INTRAMUSCULAR; INTRAVENOUS at 22:08

## 2025-06-01 RX ADMIN — HYDROMORPHONE HYDROCHLORIDE 0.5 MG: 1 INJECTION, SOLUTION INTRAMUSCULAR; INTRAVENOUS; SUBCUTANEOUS at 17:16

## 2025-06-01 RX ADMIN — MAGNESIUM SULFATE HEPTAHYDRATE 2 G: 2 INJECTION, SOLUTION INTRAVENOUS at 19:18

## 2025-06-01 ASSESSMENT — COLUMBIA-SUICIDE SEVERITY RATING SCALE - C-SSRS
1. IN THE PAST MONTH, HAVE YOU WISHED YOU WERE DEAD OR WISHED YOU COULD GO TO SLEEP AND NOT WAKE UP?: NO
2. HAVE YOU ACTUALLY HAD ANY THOUGHTS OF KILLING YOURSELF IN THE PAST MONTH?: NO
6. HAVE YOU EVER DONE ANYTHING, STARTED TO DO ANYTHING, OR PREPARED TO DO ANYTHING TO END YOUR LIFE?: NO

## 2025-06-01 ASSESSMENT — ACTIVITIES OF DAILY LIVING (ADL)
ADLS_ACUITY_SCORE: 59

## 2025-06-01 NOTE — ED PROVIDER NOTES
EMERGENCY DEPARTMENT ENCOUNTER      NAME: Gia Sultana  AGE: 59 year old female  YOB: 1965  MRN: [id number]  EVALUATION DATE & TIME: 6/1/2025  4:29 PM    PCP: Mansi Mccracken    ED PROVIDER: Michelle Sotelo M.D.      Chief Complaint   Patient presents with    Abdominal Pain         FINAL IMPRESSION:  1. Intra-abdominal fluid collection    2. Right lower quadrant abdominal pain        ED COURSE & MEDICAL DECISION MAKING:    Pertinent Labs & Imaging studies reviewed. (See chart for details)  ED Course as of 06/01/25 2228   Sun Jun 01, 2025   1645 Patient is a 59-year-old female comes in today for evaluation of abdominal pain.  She had surgery with Dr. Mendoza and left the hospital last Monday and had been feeling better for a few days and then Saturday afternoon the pain on the right side and her back started and has continued to worsen.  This is not the pain where she had it before.  She has no fevers or chills.  She has no nausea or vomiting.  She has not been hungry.  On exam she is quite tender on the right side of her abdomen.  She still has a BRITT drain and it appears to be draining.  I ordered some Dilaudid and some fluids for her since she has not been eating or drinking much.  Will check labs and plan to get CT imaging to further evaluate.  I discussed all this with her and she is in agreement with the plan.   1830 Lab work came back showing a white count of 16.5 with normal lactic acid.  Platelet count is little bit elevated and magnesium is low at 1.5.  I will replace the magnesium.  Patient has gone down for imaging and we are still waiting for results.   2004 I spoke with Dr. Robledo general surgery.  He recommends IV antibiotics and admission to the hospital.  They will see the patient tomorrow.  I will put a call to the hospitalist.   2009 I updated the patient on the findings and plan for admission to the hospital.  She is in agreement.   2034 Discussed case with Dr. Walters with the  hospitalist service.  He is in agreement with the [facility] inpatient admission.       Medical Decision Making    History:  Supplemental history from or  use: None  External Record(s) reviewed: I reviewed Dr. Mendoza's note on May 26, 2025.  Patient was admitted for sigmoid colectomy for previous perforated diverticulitis and underwent an uncomplicated robot-assisted laparoscopic sigmoid colectomy with splenic fracture takedown and bilateral ureteral stent insertion with cystoscopy.  Following a brief recovery in the PACU she was transferred to the [facility] floor for the remainder of her stay.  Her postoperative course was uncomplicated with slow advancement of diet to low fiber.  Hospital medicine followed the patient while inpatient and monitored for hypoxia.  Patient was discharged without oxygen.  On postoperative day 4 she was discharged home tolerating a general diet and ambulating without assistance and pain controlled on oral analgesics.    Work Up:  Emergent/Severe conditions considered and evaluated for: Electrolyte abnormality, hyperglycemia, hypoglycemia, acidosis, anemia, thrombocytopenia, renal failure, dehydration, cholecystitis, hepatitis, pancreatitis, appendicitis, bowel obstruction, renal stone, hernia, ischemic bowel, aortic aneurysm, perforated viscus.  I independently reviewed and interpreted CT abdomen pelvis which shows inflammation in the right lower quadrant. See full radiology report for all details.   In addition to work up documented, I considered the following work up: None  Medications given that require intensive monitoring for toxicity: IV Dilaudid    External consultation:  Discussion of management with another provider: General Surgery, hospitalist    Complicating factors:  Care impacted by chronic illness: GERD, hypertension, dyslipidemia    Disposition considerations: Admission    MIPS (CTA, Dental pain, Curiel, Sinusitis, Asthma/COPD, Head Trauma)  The patient has signs of  sepsis   Sepsis ED evaluation   The patient has signs of sepsis as evidenced by:  1. Presence of 2 SIRS criteria, suspected infection, AND  2. Organ dysfunction: Sepsis work up in progress. Will continue to monitor for signs of organ dysfunction    Sepsis Care Initiation: Starting at 8:04 PM on 06/01/25, until specified. Prior to this documentation, sepsis, severe sepsis, or septic shock was NOT thought to be a significant cause of illness. This order represents the first time infection was seriously considered to be affecting the patient.    Lactic Acid Results:  Recent Labs   Lab Test 06/01/25  1715   LACT 1.6       3 Hour Bundle 6 Hour Bundle (Reassessment)   Blood Cultures before IV Antibiotics: No, antibiotics were started prior to BCx collection b/c waiting for BCx to be collected would have been detrimental to the patient  Antibiotics given: see below  Prehospital fluid volume (mL):                     Total fluids given (ED +Pre-hospital):  The patient responded to a lesser volume of IV fluids. The initial volume ordered was 1000 mL.    Repeat Lactic Acid Level: Ordered by reflex for 2 hours after initial lactic acid collection.  Vasopressors: MAP>65 after initial IVF bolus, will continue to monitor fluid status and vital signs.  Repeat perfusion exam: I attest to having performed a repeat sepsis exam and assessment of perfusion at 10:30 PM .   BMI Readings from Last 1 Encounters:   06/01/25 38.04 kg/m        Anti-infectives (From admission through now)      Start     Dose/Rate Route Frequency Ordered Stop    06/01/25 2030  ciprofloxacin (CIPRO) infusion 400 mg         400 mg  200 mL/hr over 60 Minutes Intravenous ONCE 06/01/25 2004 06/01/25 2209 06/01/25 2030  metroNIDAZOLE (FLAGYL) infusion 500 mg        Note to Pharmacy: Metronidazole IV may be dosed more frequently than Q12h for bacteremia, CNS infection and Clostridium difficile.    500 mg  over 60 Minutes Intravenous ONCE 06/01/25 2004 06/01/25 2142                 At the conclusion of the encounter I discussed  the results of all of the tests and the disposition with patient.   All questions were answered.  The patient acknowledged understanding and was involved in the decision making regarding the overall care plan.      MEDICATIONS GIVEN IN THE EMERGENCY:  Medications   cetirizine (zyrTEC) tablet 10 mg (has no administration in time range)   minoxidil (LONITEN) half-tab 1.25 mg (has no administration in time range)   pantoprazole (PROTONIX) EC tablet 40 mg (has no administration in time range)   venlafaxine (EFFEXOR XR) 24 hr capsule 75 mg (has no administration in time range)   lidocaine 1 % 0.1-1 mL (has no administration in time range)   lidocaine (LMX4) cream (has no administration in time range)   sodium chloride (PF) 0.9% PF flush 3 mL (has no administration in time range)   sodium chloride (PF) 0.9% PF flush 3 mL (has no administration in time range)   calcium carbonate (TUMS) chewable tablet 1,000 mg (has no administration in time range)   acetaminophen (TYLENOL) tablet 650 mg (has no administration in time range)     Or   acetaminophen (TYLENOL) Suppository 650 mg (has no administration in time range)   melatonin tablet 5 mg (has no administration in time range)   ondansetron (ZOFRAN ODT) ODT tab 4 mg ( Oral See Alternative 6/1/25 2208)     Or   ondansetron (ZOFRAN) injection 4 mg (4 mg Intravenous $Given 6/1/25 2208)   sodium chloride 0.9 % infusion (has no administration in time range)   ciprofloxacin (CIPRO) infusion 400 mg (has no administration in time range)   metroNIDAZOLE (FLAGYL) infusion 500 mg (has no administration in time range)   HYDROmorphone (PF) (DILAUDID) injection 0.5 mg (has no administration in time range)   naloxone (NARCAN) injection 0.2 mg (has no administration in time range)     Or   naloxone (NARCAN) injection 0.4 mg (has no administration in time range)     Or   naloxone (NARCAN) injection 0.2 mg (has no administration in  time range)     Or   naloxone (NARCAN) injection 0.4 mg (has no administration in time range)   HYDROmorphone (PF) (DILAUDID) injection 0.5 mg (0.5 mg Intravenous $Given 6/1/25 1716)   sodium chloride 0.9% BOLUS 1,000 mL (0 mLs Intravenous Stopped 6/1/25 1852)   iopamidol (ISOVUE-370) solution 90 mL (90 mLs Intravenous $Given 6/1/25 1801)   magnesium sulfate 2 g in 50 mL sterile water intermittent infusion (0 g Intravenous Stopped 6/1/25 2040)   HYDROmorphone (PF) (DILAUDID) injection 0.5 mg (0.5 mg Intravenous $Given 6/1/25 1923)   ciprofloxacin (CIPRO) infusion 400 mg (0 mg Intravenous Stopped 6/1/25 2209)   metroNIDAZOLE (FLAGYL) infusion 500 mg (500 mg Intravenous $New Bag 6/1/25 2047)       NEW PRESCRIPTIONS STARTED AT TODAY'S ER VISIT  New Prescriptions    No medications on file          =================================================================    HPI    Triage Note: Pt had colectomy on 5/22 with BRITT drain placed on right. Per pt pain in back and RLQ starting last night. Last BM today, loose as has been since surgery. No change to drainage in BRITT. No fevers.     Triage Assessment (Adult)       Row Name 06/01/25 2215          Triage Assessment    Airway WDL WDL        Respiratory WDL    Respiratory WDL WDL        Skin Circulation/Temperature WDL    Skin Circulation/Temperature WDL WDL        Cardiac WDL    Cardiac WDL WDL        Peripheral/Neurovascular WDL    Peripheral Neurovascular WDL WDL        Cognitive/Neuro/Behavioral WDL    Cognitive/Neuro/Behavioral WDL WDL                     Gia Sultana is a 59 year old female who presents for evaluation of right sided lower abdominal pain that started yesterday and continues to worsen after recent surgery with Dr. Mendoza    PAST MEDICAL HISTORY:  Past Medical History:   Diagnosis Date    Anxiety     Arthritis     lower back and knees    Cholelithiasis     Depression     Diverticular disease of large intestine     Dyslipidemia     Female stress incontinence      GERD (gastroesophageal reflux disease)     Hepatic cyst 2018    HTN (hypertension)     Obese     Renal cyst     Snoring        PAST SURGICAL HISTORY:  Past Surgical History:   Procedure Laterality Date    COLECTOMY, SIGMOID, ROBOT-ASSISTED, LAPAROSCOPIC, USING DA CHRISSIE XI N/A 2025    Procedure: SPLENIC FLEXURE TAKEDOWN, COLECTOMY, SIGMOID, ROBOT-ASSISTED, LAPAROSCOPIC, USING DA CHRISSIE XI;  Surgeon: Flaco Mendoza DO;  Location: [facility] OR    COLONOSCOPY N/A 2025    Procedure: COLONOSCOPY with polypectomy;  Surgeon: Flaco Mendoza DO;  Location: [facility] OR    COMBINED CYSTOSCOPY, INSERT STENT URETER(S) Bilateral 2025    Procedure: CYSTOSCOPY, WITH BILATERAL URETERAL STENT INSERTION AND INDOCYANINE GREEN INSTILLATION;  Surgeon: Indio Henderson MD;  Location: [facility] OR    ORTHOPEDIC SURGERY      Tirn miniscus surgery    [facility]  DELIVERY ONLY      Description:  Section;  Recorded: 10/16/2008;  Comments: x2    [facility] LAP,CHOLECYSTECTOMY/EXPLORE      Description: Cholecystectomy Laparoscopic;  Recorded: 04/15/2008;    [facility] REVISN TRACHEA,CERVICAL      Tracheoplasty: Trach inserted as a child       CURRENT MEDICATIONS:      Current Facility-Administered Medications:     acetaminophen (TYLENOL) tablet 650 mg, 650 mg, Oral, Q4H PRN **OR** acetaminophen (TYLENOL) Suppository 650 mg, 650 mg, Rectal, Q4H PRN, Jose Wlaters MD    calcium carbonate (TUMS) chewable tablet 1,000 mg, 1,000 mg, Oral, 4x Daily PRN, Jose Walters MD    [START ON 2025] cetirizine (zyrTEC) tablet 10 mg, 10 mg, Oral, QPM, Jose Walters MD    [START ON 2025] ciprofloxacin (CIPRO) infusion 400 mg, 400 mg, Intravenous, Q12H, Jose Walters MD    HYDROmorphone (PF) (DILAUDID) injection 0.5 mg, 0.5 mg, Intravenous, Q4H PRN, Jose Walters MD    lidocaine (LMX4) cream, , Topical, Q1H PRN, Jose Walters MD    lidocaine 1 % 0.1-1 mL, 0.1-1 mL, Other, Q1H PRN, Romeo, Jose ARANGO MD     melatonin tablet 5 mg, 5 mg, Oral, At Bedtime PRN, Jose Walters MD    [START ON 6/2/2025] metroNIDAZOLE (FLAGYL) infusion 500 mg, 500 mg, Intravenous, Q12H, Jose Walters MD    [START ON 6/2/2025] minoxidil (LONITEN) half-tab 1.25 mg, 1.25 mg, Oral, QPM, Jose Walters MD    naloxone (NARCAN) injection 0.2 mg, 0.2 mg, Intravenous, Q2 Min PRN **OR** naloxone (NARCAN) injection 0.4 mg, 0.4 mg, Intravenous, Q2 Min PRN **OR** naloxone (NARCAN) injection 0.2 mg, 0.2 mg, Intramuscular, Q2 Min PRN **OR** naloxone (NARCAN) injection 0.4 mg, 0.4 mg, Intramuscular, Q2 Min PRN, Jose Walters MD    ondansetron (ZOFRAN ODT) ODT tab 4 mg, 4 mg, Oral, Q6H PRN **OR** ondansetron (ZOFRAN) injection 4 mg, 4 mg, Intravenous, Q6H PRN, Jose Walters MD, 4 mg at 06/01/25 2208    [START ON 6/2/2025] pantoprazole (PROTONIX) EC tablet 40 mg, 40 mg, Oral, QPM, Jose Walters MD    sodium chloride (PF) 0.9% PF flush 3 mL, 3 mL, Intracatheter, Q8H LIZETH, Jose Walters MD    sodium chloride (PF) 0.9% PF flush 3 mL, 3 mL, Intracatheter, q1 min prn, Jose Walters MD    sodium chloride 0.9 % infusion, , Intravenous, Continuous, Jose Walters MD    [START ON 6/2/2025] venlafaxine (EFFEXOR XR) 24 hr capsule 75 mg, 75 mg, Oral, QPM, Jose Walters MD    Current Outpatient Medications:     acetaminophen (TYLENOL) 325 MG tablet, Take 325-975 mg by mouth every 6 hours as needed for mild pain (max of 4,000 mg in 24 hours)., Disp: , Rfl:     cetirizine (ZYRTEC) 10 MG tablet, Take 10 mg by mouth daily., Disp: , Rfl:     ibuprofen (ADVIL/MOTRIN) 200 MG tablet, Take 2-3 tablets (400-600 mg) by mouth every 6 hours as needed for pain., Disp: , Rfl:     minoxidil (LONITEN) 2.5 MG tablet, Take 1.25 mg by mouth daily. Hair growth, Disp: , Rfl:     omeprazole (PRILOSEC) 20 MG DR capsule, TAKE 1 CAPSULE BY MOUTH EVERY DAY, Disp: 90 capsule, Rfl: 2    oxyCODONE (ROXICODONE) 5 MG tablet, Take 0.5-1 tablets (2.5-5 mg) by mouth every 6 hours as needed  for moderate to severe pain., Disp: 10 tablet, Rfl: 0    polyethylene glycol (MIRALAX) 17 GM/Dose powder, Take 17 g (1 Capful) by mouth daily as needed for constipation., Disp: , Rfl:     Semaglutide-Weight Management (WEGOVY) 2.4 MG/0.75ML pen, Inject 2.4 mg subcutaneously once a week., Disp: 3 mL, Rfl: 5    venlafaxine (EFFEXOR XR) 75 MG 24 hr capsule, TAKE 1 CAPSULE BY MOUTH EVERY DAY, Disp: 90 capsule, Rfl: 2    ALLERGIES:  Allergies   Allergen Reactions    Erythromycin Other (See Comments)     Abdominal cramping     Penicillin G Other (See Comments)     Turned yellow    Sulfa (Sulfonamide Antibiotics) [Sulfa Antibiotics] Rash    Nexium [Esomeprazole] Palpitations     Felt like BP and HR increased/palitations.    Penicillins Rash     Occurred as a small child        FAMILY HISTORY:  Family History   Problem Relation Age of Onset    Diabetes Mother     Obesity Mother     Hypertension Mother     Cerebrovascular Disease Mother     Dementia Father     Diabetes Brother     Obesity Brother     Diabetes Maternal Grandmother     Thyroid Cancer No family hx of     Anesthesia Reaction No family hx of        SOCIAL HISTORY:   Social History     Socioeconomic History    Marital status:    Tobacco Use    Smoking status: Every Day     Current packs/day: 0.25     Average packs/day: 0.3 packs/day for 5.0 years (1.3 ttl pk-yrs)     Types: Cigarettes    Smokeless tobacco: Never   Vaping Use    Vaping status: Never Used   Substance and Sexual Activity    Alcohol use: Not Currently     Alcohol/week: 0.0 - 5.0 standard drinks of alcohol    Drug use: No    Sexual activity: Yes     Partners: Male     Birth control/protection: Post-menopausal     Comment: vasectomy in    Other Topics Concern    Parent/sibling w/ CABG, MI or angioplasty before 65F 55M? No     Social Drivers of Health     Financial Resource Strain: Low Risk  (5/22/2025)    Financial Resource Strain     Within the past 12 months, have you or your family  "members you live with been unable to get utilities (heat, electricity) when it was really needed?: No   Food Insecurity: Low Risk  (5/22/2025)    Food Insecurity     Within the past 12 months, did you worry that your food would run out before you got money to buy more?: No     Within the past 12 months, did the food you bought just not last and you didn t have money to get more?: No   Transportation Needs: Low Risk  (5/22/2025)    Transportation Needs     Within the past 12 months, has lack of transportation kept you from medical appointments, getting your medicines, non-medical meetings or appointments, work, or from getting things that you need?: No   Interpersonal Safety: Low Risk  (5/22/2025)    Interpersonal Safety     Do you feel physically and emotionally safe where you currently live?: Yes     Within the past 12 months, have you been hit, slapped, kicked or otherwise physically hurt by someone?: No     Within the past 12 months, have you been humiliated or emotionally abused in other ways by your partner or ex-partner?: No   Housing Stability: Low Risk  (5/22/2025)    Housing Stability     Do you have housing? : Yes     Are you worried about losing your housing?: No       PHYSICAL EXAM    VITAL SIGNS: /58   Pulse 99   Temp 98.1  F (36.7  C)   Resp 16   Ht 1.575 m (5' 2\")   Wt 94.3 kg (208 lb)   SpO2 94%   BMI 38.04 kg/m     GENERAL: Awake, alert, answering questions appropriately, appears mildly uncomfortable  SPEECH:  Easy to understand speech, Normal volume and jazz  PULMONARY: No respiratory distress, Lungs clear to auscultation bilaterally  CARDIOVASCULAR: Regular rate and rhythm, Distal pulses present and normal.  ABDOMINAL: Tenderness to the right lower quadrant with BRITT drain in place.  No rebound or guarding, No palpable masses  EXTREMITIES: No lower extremity edema.  PSYCH: Normal mood and affect     LAB:  All pertinent labs reviewed and interpreted.  Results for orders placed or " performed during the hospital encounter of 06/01/25   CT Abdomen Pelvis w Contrast    Impression    IMPRESSION:     1.  Postoperative changes of recent partial colectomy with colorectal anastomosis.    2.  5 cm focal fluid collection without enhancing rim seen in the lower ventral abdominal wall with overlying subcutaneous fat stranding, likely postoperative seroma.    3.  Significant fat stranding in the right abdominal wall and right lower quadrant along the course of a drainage catheter, nonspecific for inflammation versus infection. No associated fluid collections.    4.  Mild wall thickening of the cecum and ascending colon adjacent to aforementioned drainage catheter, most compatible with reactive inflammation.    5.  3 mm hyperattenuating focus in the left renal pelvis without corresponding filling defect on the delayed sequence, indeterminate and may represent a small blood clot. Nevertheless, short-term follow-up CT of the abdomen and pelvis with intravenous   contrast in 3 months is recommended to ensure resolution.    6.  Mildly increased intrahepatic biliary ductal dilatation, nonspecific. Recommend correlation with bilirubin/liver function tests.    7.  Colonic diverticulosis without acute diverticulitis.   Basic metabolic panel   Result Value Ref Range    Sodium 137 135 - 145 mmol/L    Potassium 4.0 3.4 - 5.3 mmol/L    Chloride 97 (L) 98 - 107 mmol/L    Carbon Dioxide (CO2) 27 22 - 29 mmol/L    Anion Gap 13 7 - 15 mmol/L    Urea Nitrogen 12.6 8.0 - 23.0 mg/dL    Creatinine 0.63 0.51 - 0.95 mg/dL    GFR Estimate >90 >60 mL/min/1.73m2    Calcium 9.6 8.8 - 10.4 mg/dL    Glucose 103 (H) 70 - 99 mg/dL   Result Value Ref Range    Magnesium 1.5 (L) 1.7 - 2.3 mg/dL   Result Value Ref Range    Lipase 23 13 - 60 U/L   Hepatic function panel   Result Value Ref Range    Protein Total 6.8 6.4 - 8.3 g/dL    Albumin 3.6 3.5 - 5.2 g/dL    Bilirubin Total 0.4 <=1.2 mg/dL    Alkaline Phosphatase 106 40 - 150 U/L    AST  27 0 - 45 U/L    ALT 26 0 - 50 U/L    Bilirubin Direct <0.08 0.00 - 0.30 mg/dL   Lactic acid whole blood   Result Value Ref Range    Lactic Acid 1.6 0.7 - 2.0 mmol/L   CBC with platelets and differential   Result Value Ref Range    WBC Count 16.5 (H) 4.0 - 11.0 10e3/uL    RBC Count 5.19 3.80 - 5.20 10e6/uL    Hemoglobin 14.3 11.7 - 15.7 g/dL    Hematocrit 43.0 35.0 - 47.0 %    MCV 83 78 - 100 fL    MCH 27.6 26.5 - 33.0 pg    MCHC 33.3 31.5 - 36.5 g/dL    RDW 14.0 10.0 - 15.0 %    Platelet Count 511 (H) 150 - 450 10e3/uL    % Neutrophils 75 %    % Lymphocytes 17 %    % Monocytes 5 %    % Eosinophils 2 %    % Basophils 1 %    % Immature Granulocytes 1 %    NRBCs per 100 WBC 0 <1 /100    Absolute Neutrophils 12.3 (H) 1.6 - 8.3 10e3/uL    Absolute Lymphocytes 2.8 0.8 - 5.3 10e3/uL    Absolute Monocytes 0.9 0.0 - 1.3 10e3/uL    Absolute Eosinophils 0.3 0.0 - 0.7 10e3/uL    Absolute Basophils 0.1 0.0 - 0.2 10e3/uL    Absolute Immature Granulocytes 0.1 <=0.4 10e3/uL    Absolute NRBCs 0.0 10e3/uL       RADIOLOGY:  CT Abdomen Pelvis w Contrast   Final Result   IMPRESSION:       1.  Postoperative changes of recent partial colectomy with colorectal anastomosis.      2.  5 cm focal fluid collection without enhancing rim seen in the lower ventral abdominal wall with overlying subcutaneous fat stranding, likely postoperative seroma.      3.  Significant fat stranding in the right abdominal wall and right lower quadrant along the course of a drainage catheter, nonspecific for inflammation versus infection. No associated fluid collections.      4.  Mild wall thickening of the cecum and ascending colon adjacent to aforementioned drainage catheter, most compatible with reactive inflammation.      5.  3 mm hyperattenuating focus in the left renal pelvis without corresponding filling defect on the delayed sequence, indeterminate and may represent a small blood clot. Nevertheless, short-term follow-up CT of the abdomen and pelvis with  intravenous    contrast in 3 months is recommended to ensure resolution.      6.  Mildly increased intrahepatic biliary ductal dilatation, nonspecific. Recommend correlation with bilirubin/liver function tests.      7.  Colonic diverticulosis without acute diverticulitis.          Michelle Sotelo M.D.  Emergency Medicine  [facility] EMERGENCY DEPARTMENT  [facility] [facility] [id number] [phone]  Dept: [phone]       Michelle Sotelo MD  06/01/25 5370

## 2025-06-01 NOTE — ED TRIAGE NOTES
Pt had colectomy on 5/22 with BRITT drain placed on right. Per pt pain in back and RLQ starting last night. Last BM today, loose as has been since surgery. No change to drainage in BRITT. No fevers.     Triage Assessment (Adult)       Row Name 06/01/25 2599          Triage Assessment    Airway WDL WDL        Respiratory WDL    Respiratory WDL WDL        Skin Circulation/Temperature WDL    Skin Circulation/Temperature WDL WDL        Cardiac WDL    Cardiac WDL WDL        Peripheral/Neurovascular WDL    Peripheral Neurovascular WDL WDL        Cognitive/Neuro/Behavioral WDL    Cognitive/Neuro/Behavioral WDL WDL

## 2025-06-02 LAB
ALBUMIN SERPL BCG-MCNC: 3.4 G/DL (ref 3.5–5.2)
ALP SERPL-CCNC: 106 U/L (ref 40–150)
ALT SERPL W P-5'-P-CCNC: 25 U/L (ref 0–50)
ANION GAP SERPL CALCULATED.3IONS-SCNC: 9 MMOL/L (ref 7–15)
AST SERPL W P-5'-P-CCNC: 19 U/L (ref 0–45)
BASOPHILS # BLD AUTO: 0.1 10E3/UL (ref 0–0.2)
BASOPHILS NFR BLD AUTO: 0 %
BILIRUB SERPL-MCNC: 0.4 MG/DL
BUN SERPL-MCNC: 9.2 MG/DL (ref 8–23)
CALCIUM SERPL-MCNC: 8.8 MG/DL (ref 8.8–10.4)
CHLORIDE SERPL-SCNC: 98 MMOL/L (ref 98–107)
CREAT SERPL-MCNC: 0.73 MG/DL (ref 0.51–0.95)
CRP SERPL-MCNC: 158.2 MG/L
EGFRCR SERPLBLD CKD-EPI 2021: >90 ML/MIN/1.73M2
EOSINOPHIL # BLD AUTO: 0.1 10E3/UL (ref 0–0.7)
EOSINOPHIL NFR BLD AUTO: 1 %
ERYTHROCYTE [DISTWIDTH] IN BLOOD BY AUTOMATED COUNT: 14.2 % (ref 10–15)
GLUCOSE SERPL-MCNC: 110 MG/DL (ref 70–99)
HCO3 SERPL-SCNC: 30 MMOL/L (ref 22–29)
HCT VFR BLD AUTO: 40 % (ref 35–47)
HGB BLD-MCNC: 13.3 G/DL (ref 11.7–15.7)
IMM GRANULOCYTES # BLD: 0.1 10E3/UL
IMM GRANULOCYTES NFR BLD: 1 %
LYMPHOCYTES # BLD AUTO: 2.3 10E3/UL (ref 0.8–5.3)
LYMPHOCYTES NFR BLD AUTO: 14 %
MAGNESIUM SERPL-MCNC: 2.1 MG/DL (ref 1.7–2.3)
MAGNESIUM SERPL-MCNC: 2.2 MG/DL (ref 1.7–2.3)
MCH RBC QN AUTO: 28.2 PG (ref 26.5–33)
MCHC RBC AUTO-ENTMCNC: 33.3 G/DL (ref 31.5–36.5)
MCV RBC AUTO: 85 FL (ref 78–100)
MONOCYTES # BLD AUTO: 1 10E3/UL (ref 0–1.3)
MONOCYTES NFR BLD AUTO: 6 %
NEUTROPHILS # BLD AUTO: 12.9 10E3/UL (ref 1.6–8.3)
NEUTROPHILS NFR BLD AUTO: 78 %
NRBC # BLD AUTO: 0 10E3/UL
NRBC BLD AUTO-RTO: 0 /100
PLATELET # BLD AUTO: 482 10E3/UL (ref 150–450)
POTASSIUM SERPL-SCNC: 3.9 MMOL/L (ref 3.4–5.3)
PROT SERPL-MCNC: 6 G/DL (ref 6.4–8.3)
RBC # BLD AUTO: 4.72 10E6/UL (ref 3.8–5.2)
SODIUM SERPL-SCNC: 137 MMOL/L (ref 135–145)
WBC # BLD AUTO: 16.5 10E3/UL (ref 4–11)

## 2025-06-02 PROCEDURE — 83735 ASSAY OF MAGNESIUM: CPT | Performed by: INTERNAL MEDICINE

## 2025-06-02 PROCEDURE — 250N000013 HC RX MED GY IP 250 OP 250 PS 637: Performed by: INTERNAL MEDICINE

## 2025-06-02 PROCEDURE — 85025 COMPLETE CBC W/AUTO DIFF WBC: CPT | Performed by: INTERNAL MEDICINE

## 2025-06-02 PROCEDURE — 36415 COLL VENOUS BLD VENIPUNCTURE: CPT | Performed by: INTERNAL MEDICINE

## 2025-06-02 PROCEDURE — 99024 POSTOP FOLLOW-UP VISIT: CPT

## 2025-06-02 PROCEDURE — 120N000001 HC R&B MED SURG/OB

## 2025-06-02 PROCEDURE — 99233 SBSQ HOSP IP/OBS HIGH 50: CPT | Performed by: INTERNAL MEDICINE

## 2025-06-02 PROCEDURE — 86140 C-REACTIVE PROTEIN: CPT | Performed by: INTERNAL MEDICINE

## 2025-06-02 PROCEDURE — 250N000011 HC RX IP 250 OP 636: Performed by: INTERNAL MEDICINE

## 2025-06-02 PROCEDURE — 82565 ASSAY OF CREATININE: CPT | Performed by: INTERNAL MEDICINE

## 2025-06-02 PROCEDURE — 82435 ASSAY OF BLOOD CHLORIDE: CPT | Performed by: INTERNAL MEDICINE

## 2025-06-02 RX ORDER — HYDROMORPHONE HYDROCHLORIDE 2 MG/1
2 TABLET ORAL EVERY 4 HOURS PRN
Status: DISPENSED | OUTPATIENT
Start: 2025-06-02

## 2025-06-02 RX ADMIN — ACETAMINOPHEN 650 MG: 325 TABLET ORAL at 03:51

## 2025-06-02 RX ADMIN — HYDROMORPHONE HYDROCHLORIDE 1 MG: 2 TABLET ORAL at 22:25

## 2025-06-02 RX ADMIN — VENLAFAXINE HYDROCHLORIDE 75 MG: 75 CAPSULE, EXTENDED RELEASE ORAL at 21:05

## 2025-06-02 RX ADMIN — HYDROMORPHONE HYDROCHLORIDE 0.5 MG: 1 INJECTION, SOLUTION INTRAMUSCULAR; INTRAVENOUS; SUBCUTANEOUS at 16:26

## 2025-06-02 RX ADMIN — CETIRIZINE HYDROCHLORIDE 10 MG: 10 TABLET, FILM COATED ORAL at 10:09

## 2025-06-02 RX ADMIN — CIPROFLOXACIN 400 MG: 400 INJECTION, SOLUTION INTRAVENOUS at 22:17

## 2025-06-02 RX ADMIN — METRONIDAZOLE 500 MG: 500 INJECTION, SOLUTION INTRAVENOUS at 21:06

## 2025-06-02 RX ADMIN — PANTOPRAZOLE SODIUM 40 MG: 40 TABLET, DELAYED RELEASE ORAL at 21:06

## 2025-06-02 RX ADMIN — HYDROMORPHONE HYDROCHLORIDE 0.5 MG: 1 INJECTION, SOLUTION INTRAMUSCULAR; INTRAVENOUS; SUBCUTANEOUS at 08:07

## 2025-06-02 RX ADMIN — ONDANSETRON 4 MG: 2 INJECTION, SOLUTION INTRAMUSCULAR; INTRAVENOUS at 21:23

## 2025-06-02 RX ADMIN — CIPROFLOXACIN 400 MG: 400 INJECTION, SOLUTION INTRAVENOUS at 08:16

## 2025-06-02 RX ADMIN — HYDROMORPHONE HYDROCHLORIDE 1 MG: 2 TABLET ORAL at 18:21

## 2025-06-02 RX ADMIN — ONDANSETRON 4 MG: 2 INJECTION, SOLUTION INTRAMUSCULAR; INTRAVENOUS at 08:07

## 2025-06-02 RX ADMIN — HYDROMORPHONE HYDROCHLORIDE 0.5 MG: 1 INJECTION, SOLUTION INTRAMUSCULAR; INTRAVENOUS; SUBCUTANEOUS at 03:52

## 2025-06-02 RX ADMIN — HYDROMORPHONE HYDROCHLORIDE 0.5 MG: 1 INJECTION, SOLUTION INTRAMUSCULAR; INTRAVENOUS; SUBCUTANEOUS at 12:08

## 2025-06-02 RX ADMIN — ACETAMINOPHEN 650 MG: 325 TABLET ORAL at 19:17

## 2025-06-02 RX ADMIN — MINOXIDIL 1.25 MG: 2.5 TABLET ORAL at 22:17

## 2025-06-02 RX ADMIN — METRONIDAZOLE 500 MG: 500 INJECTION, SOLUTION INTRAVENOUS at 09:40

## 2025-06-02 ASSESSMENT — ACTIVITIES OF DAILY LIVING (ADL)
ADLS_ACUITY_SCORE: 38
ADLS_ACUITY_SCORE: 59
ADLS_ACUITY_SCORE: 38
ADLS_ACUITY_SCORE: 59
ADLS_ACUITY_SCORE: 38
CONCENTRATING,_REMEMBERING_OR_MAKING_DECISIONS_DIFFICULTY: NO
ADLS_ACUITY_SCORE: 38
ADLS_ACUITY_SCORE: 59
ADLS_ACUITY_SCORE: 38
ADLS_ACUITY_SCORE: 59
ADLS_ACUITY_SCORE: 38
ADLS_ACUITY_SCORE: 59

## 2025-06-02 NOTE — H&P
"[facility]    History and Physical - Hospitalist Service       Date of Admission:  6/1/2025    Assessment & Plan      59-year-old female with past medical history of obesity, mood disorder and history of diverticulitis with perforation that required IR placement of drainage tube and recent hospitalization for planned sigmoid colectomy with splenic flexure takedown on 5/22/2025 who presented to the emergency room for worsening abdominal pain.  Found to have postoperative fluid collection with seroma versus abscess    Abdominal pain with postoperative seroma  - S/P uncomplicated Robot assisted laparoscopic sigmoid colectomy with splenic flexure takedown on 5/22/2025  -CT abdomen shows 5 cm focal fluid collection without enhancing rim seen in the lower ventral abdominal wall with overlying subcutaneous fat stranding, likely postoperative seroma.  - Surgical consult, appreciate input  -Normal lactic acid but elevated white blood cells  -Start IV antibiotic empirically with Flagyl and Cipro  -Check CRP  -Keep patient n.p.o. after midnight   - Continue IV fluid support  -IV Dilaudid as needed    Depression/anxiety  - Resume home medications    Hypomagnesemia  -Replace per protocol    Obesity  PTA Wegovy on hold        Diet: Regular Diet Adult  NPO for Medical/Clinical Reasons Except for: Meds, Ice Chips    DVT Prophylaxis: Pneumatic Compression Devices  Curiel Catheter: Not present  Lines: None     Cardiac Monitoring: ACTIVE order. Indication: Surgery  Code Status: Full Code      Clinically Significant Risk Factors Present on Admission          # Hypochloremia: Lowest Cl = 97 mmol/L in last 2 days, will monitor as appropriate    # Hypomagnesemia: Lowest Mg = 1.5 mg/dL in last 2 days, will replace as needed       # Hypertension: Noted on problem list           # Obesity: Estimated body mass index is 38.04 kg/m  as calculated from the following:    Height as of this encounter: 1.575 m (5' 2\").    " Weight as of this encounter: 94.3 kg (208 lb).              Disposition Plan     Medically Ready for Discharge: Anticipated in 2-4 Days           Jose Walters MD  Hospitalist Service  [facility]  Securely message with [employer] (more info)  Text page via [employer] Paging/Directory     ______________________________________________________________________    Chief Complaint   Abdominal pain    History is obtained from the patient    History of Present Illness   Gia CONCHITA Sultana is a 59-year-old female with past medical history of obesity, mood disorder and history of diverticulitis with perforation that required IR placement of drainage tube and recent hospitalization for planned sigmoid colectomy with splenic flexure takedown on 5/22/2025 who presented to the emergency room for worsening abdominal pain.  Basically the patient was discharged postprocedure on 5/26/2025 in stable condition and was doing well till yesterday evening starting to having some abdominal pain.  Patient denied any nausea or vomiting.  No fever or chills.  Patient noticed no changes in the abdominal drain CT abdomen shows 5 cm focal fluid collection without enhancing rim seen in the lower ventral abdominal wall with overlying subcutaneous fat stranding, likely postoperative seroma.       Past Medical History    Past Medical History:   Diagnosis Date    Anxiety     Arthritis     lower back and knees    Cholelithiasis     Depression     Diverticular disease of large intestine     Dyslipidemia     Female stress incontinence     GERD (gastroesophageal reflux disease)     Hepatic cyst 03/07/2018    HTN (hypertension)     Obese     Renal cyst     Snoring        Past Surgical History   Past Surgical History:   Procedure Laterality Date    COLECTOMY, SIGMOID, ROBOT-ASSISTED, LAPAROSCOPIC, USING DA CHRISSIE XI N/A 5/22/2025    Procedure: SPLENIC FLEXURE TAKEDOWN, COLECTOMY, SIGMOID, ROBOT-ASSISTED, LAPAROSCOPIC, USING DA CHRISSIE XI;   Surgeon: Flaco Mendoza DO;  Location: [facility] OR    COLONOSCOPY N/A 2025    Procedure: COLONOSCOPY with polypectomy;  Surgeon: Flaco Mendoza DO;  Location: [facility] OR    COMBINED CYSTOSCOPY, INSERT STENT URETER(S) Bilateral 2025    Procedure: CYSTOSCOPY, WITH BILATERAL URETERAL STENT INSERTION AND INDOCYANINE GREEN INSTILLATION;  Surgeon: Indio Henderson MD;  Location: [facility] OR    ORTHOPEDIC SURGERY      Tirn miniscus surgery    [facility]  DELIVERY ONLY      Description:  Section;  Recorded: 10/16/2008;  Comments: x2    [facility] LAP,CHOLECYSTECTOMY/EXPLORE      Description: Cholecystectomy Laparoscopic;  Recorded: 04/15/2008;    [facility] REVISN TRACHEA,CERVICAL      Tracheoplasty: Trach inserted as a child       Prior to Admission Medications   Prior to Admission Medications   Prescriptions Last Dose Informant Patient Reported? Taking?   Semaglutide-Weight Management (WEGOVY) 2.4 MG/0.75ML pen 2025 Evening  No Yes   Sig: Inject 2.4 mg subcutaneously once a week.   acetaminophen (TYLENOL) 325 MG tablet   Yes Yes   Sig: Take 325-975 mg by mouth every 6 hours as needed for mild pain (max of 4,000 mg in 24 hours).   cetirizine (ZYRTEC) 10 MG tablet 2025 Evening  Yes Yes   Sig: Take 10 mg by mouth daily.   ibuprofen (ADVIL/MOTRIN) 200 MG tablet   No Yes   Sig: Take 2-3 tablets (400-600 mg) by mouth every 6 hours as needed for pain.   minoxidil (LONITEN) 2.5 MG tablet 2025 Evening  Yes Yes   Sig: Take 1.25 mg by mouth daily. Hair growth   omeprazole (PRILOSEC) 20 MG DR capsule 2025 Evening  No Yes   Sig: TAKE 1 CAPSULE BY MOUTH EVERY DAY   oxyCODONE (ROXICODONE) 5 MG tablet   No Yes   Sig: Take 0.5-1 tablets (2.5-5 mg) by mouth every 6 hours as needed for moderate to severe pain.   polyethylene glycol (MIRALAX) 17 GM/Dose powder   No Yes   Sig: Take 17 g (1 Capful) by mouth daily as needed for constipation.   venlafaxine (EFFEXOR XR) 75 MG 24 hr capsule 2025  Evening  No Yes   Sig: TAKE 1 CAPSULE BY MOUTH EVERY DAY      Facility-Administered Medications: None        Review of Systems    The 10 point Review of Systems is negative other than noted in the HPI or here.     Social History   I have reviewed this patient's social history and updated it with pertinent information if needed.  Social History     Tobacco Use    Smoking status: Every Day     Current packs/day: 0.25     Average packs/day: 0.3 packs/day for 5.0 years (1.3 ttl pk-yrs)     Types: Cigarettes    Smokeless tobacco: Never   Vaping Use    Vaping status: Never Used   Substance Use Topics    Alcohol use: Not Currently     Alcohol/week: 0.0 - 5.0 standard drinks of alcohol    Drug use: No         Family History   I have reviewed this patient's family history and updated it with pertinent information if needed.  Family History   Problem Relation Age of Onset    Diabetes Mother     Obesity Mother     Hypertension Mother     Cerebrovascular Disease Mother     Dementia Father     Diabetes Brother     Obesity Brother     Diabetes Maternal Grandmother     Thyroid Cancer No family hx of     Anesthesia Reaction No family hx of          Allergies   Allergies   Allergen Reactions    Erythromycin Other (See Comments)     Abdominal cramping     Penicillin G Other (See Comments)     Turned yellow    Sulfa (Sulfonamide Antibiotics) [Sulfa Antibiotics] Rash    Nexium [Esomeprazole] Palpitations     Felt like BP and HR increased/palitations.    Penicillins Rash     Occurred as a small child         Physical Exam   Vital Signs: Temp: 98.1  F (36.7  C)   BP: 122/58 Pulse: 99   Resp: 16 SpO2: 94 %      Weight: 208 lbs 0 oz    General Appearance: Sleeping comfortable in bed in no acute respiratory distress  Respiratory: Decreased breath sounds at lung base with scattered rhonchi, no rales  Cardiovascular: Normal heart sound, no murmur.  GI: Soft, positive tenderness on the right side abdomen, infected drain with blood-tinged  liquid output.  Skin: Dry, warm, no rash.  Other: Grossly intact, no focal deficit.    Medical Decision Making       75 MINUTES SPENT BY ME on the date of service doing chart review, history, exam, documentation & further activities per the note.      Data

## 2025-06-02 NOTE — PHARMACY-ADMISSION MEDICATION HISTORY
Pharmacist Admission Medication History    Admission medication history is complete. The information provided in this note is only as accurate as the sources available at the time of the update.    Information Source(s): Patient, Clinic records, Hospital records, and CareEverywhere/SureScripts via in-person    Pertinent Information: she takes all of her routine medications once daily in the evening and wasn't able to take any yesterday d/t abdominal pain.     Changes made to PTA medication list:  Added: None  Deleted: None  Changed: None    Allergies reviewed with patient and updates made in EHR: yes    Medication History Completed By: Allan Addison [facility] 6/1/2025 8:37 PM    PTA Med List   Medication Sig Last Dose/Taking    acetaminophen (TYLENOL) 325 MG tablet Take 325-975 mg by mouth every 6 hours as needed for mild pain (max of 4,000 mg in 24 hours). Taking As Needed    cetirizine (ZYRTEC) 10 MG tablet Take 10 mg by mouth daily. 5/30/2025 Evening    ibuprofen (ADVIL/MOTRIN) 200 MG tablet Take 2-3 tablets (400-600 mg) by mouth every 6 hours as needed for pain. Taking As Needed    minoxidil (LONITEN) 2.5 MG tablet Take 1.25 mg by mouth daily. Hair growth 5/30/2025 Evening    omeprazole (PRILOSEC) 20 MG DR capsule TAKE 1 CAPSULE BY MOUTH EVERY DAY 5/30/2025 Evening    oxyCODONE (ROXICODONE) 5 MG tablet Take 0.5-1 tablets (2.5-5 mg) by mouth every 6 hours as needed for moderate to severe pain. Taking As Needed    polyethylene glycol (MIRALAX) 17 GM/Dose powder Take 17 g (1 Capful) by mouth daily as needed for constipation. Taking As Needed    Semaglutide-Weight Management (WEGOVY) 2.4 MG/0.75ML pen Inject 2.4 mg subcutaneously once a week. 5/30/2025 Evening    venlafaxine (EFFEXOR XR) 75 MG 24 hr capsule TAKE 1 CAPSULE BY MOUTH EVERY DAY 5/30/2025 Evening

## 2025-06-02 NOTE — PROGRESS NOTES
General Surgery Consultation  Gia Sultana MRN# [id number]   Age/Sex: 59 year old female YOB: 1965     Reason for consult: 1. Intra-abdominal fluid collection    2. Right lower quadrant abdominal pain            Requesting physician: Jose Walters MD                   Assessment and Plan:   Assessment:  Gia Sultana is a 59-year-old female with past medical history of obesity and perforated diverticulitis who is status post elective robotic assisted laparoscopic sigmoidectomy 5/22/2025 who is presenting due to worsening postoperative pain.     She is postop day 11, reporting worsening pain to RLQ that radiates to her back. CT scan obtained revealing postoperative changes of partial colectomy with colorectal anastomosis as well as ventral abdominal wall seroma to Pfannenstiel incision, RLQ abdominal wall fat stranding adjacent to abdominal drain catheter as well as reactive inflammation to cecum and ascending colon. Labs notable for leukocytosis measuring 16.5, elevated CRP levels 158.     She is afebrile, normotensive, HR notable 90-100s. On exam, well appearing with reassuring lower abdominal pfannenstiel incision but with RLQ drain site with abdominal wall cellulitis. No further operative intervention indicated with recommendation for lab trend, abx with plan to remove drain and monitor abdominal wall incisions.     Plan:  -LFD as tolerated  -Abx per primary  -CBC trend  -Multimodal pain control  -Plan for drain removal this afternoon  -Encourage movement/activity  -Surgery to follow           Chief Complaint:     Chief Complaint   Patient presents with    Abdominal Pain        History is obtained from the patient and EMR    HPI:   Gia Sultana is a 59-year-old female with past medical history of obesity and perforated diverticulitis who is status post elective robotic assisted laparoscopic sigmoidectomy 5/22/2025 who is presenting due to worsening postoperative pain.     He reports had  been doing overall well following discharge from 2025 however with worsening abdominal pain right lower quadrant abdomen has radiated to her back since Saturday, 2025. Reports has been using tylenol every 6 hours for pain control with running out of oxycodone prior. Pain worsened yesterday, rated 8/10 at its worst to RLQ incision. Denies drainage from incisions site, fever, chills, n/v, constipation, diarrhea, blood in stools. Has been tolerating LFD wihtout difficulties. Abdominal drain with 20-40 ml of output from emptying drain twice daily (total volume 40-80/day) that has been light yellow/clear.           Past Medical History:     Past Medical History:   Diagnosis Date    Anxiety     Arthritis     lower back and knees    Cholelithiasis     Depression     Diverticular disease of large intestine     Dyslipidemia     Female stress incontinence     GERD (gastroesophageal reflux disease)     Hepatic cyst 2018    HTN (hypertension)     Obese     Renal cyst     Snoring               Past Surgical History:     Past Surgical History:   Procedure Laterality Date    COLECTOMY, SIGMOID, ROBOT-ASSISTED, LAPAROSCOPIC, USING DA CHRISSIE XI N/A 2025    Procedure: SPLENIC FLEXURE TAKEDOWN, COLECTOMY, SIGMOID, ROBOT-ASSISTED, LAPAROSCOPIC, USING DA CHRISSIE XI;  Surgeon: Flaco Mendoza DO;  Location: [facility] OR    COLONOSCOPY N/A 2025    Procedure: COLONOSCOPY with polypectomy;  Surgeon: Flaco Mendoza DO;  Location: [facility] OR    COMBINED CYSTOSCOPY, INSERT STENT URETER(S) Bilateral 2025    Procedure: CYSTOSCOPY, WITH BILATERAL URETERAL STENT INSERTION AND INDOCYANINE GREEN INSTILLATION;  Surgeon: Indio Henderson MD;  Location: [facility] OR    ORTHOPEDIC SURGERY      Tirn miniscus surgery    [facility]  DELIVERY ONLY      Description:  Section;  Recorded: 10/16/2008;  Comments: x2    [facility] LAP,CHOLECYSTECTOMY/EXPLORE      Description: Cholecystectomy Laparoscopic;   Recorded: 04/15/2008;    [facility] REVISN TRACHEA,CERVICAL      Tracheoplasty: Trach inserted as a child             Social History:    reports that she has been smoking cigarettes. She has a 1.3 pack-year smoking history. She has never used smokeless tobacco. She reports that she does not currently use alcohol. She reports that she does not use drugs.           Family History:     Family History   Problem Relation Age of Onset    Diabetes Mother     Obesity Mother     Hypertension Mother     Cerebrovascular Disease Mother     Dementia Father     Diabetes Brother     Obesity Brother     Diabetes Maternal Grandmother     Thyroid Cancer No family hx of     Anesthesia Reaction No family hx of               Allergies:     Allergies   Allergen Reactions    Erythromycin Other (See Comments)     Abdominal cramping     Penicillin G Other (See Comments)     Turned yellow    Sulfa (Sulfonamide Antibiotics) [Sulfa Antibiotics] Rash    Nexium [Esomeprazole] Palpitations     Felt like BP and HR increased/palitations.    Penicillins Rash     Occurred as a small child               Medications:     Prior to Admission medications    Medication Sig Start Date End Date Taking? Authorizing Provider   acetaminophen (TYLENOL) 325 MG tablet Take 325-975 mg by mouth every 6 hours as needed for mild pain (max of 4,000 mg in 24 hours).   Yes Reported, Patient   cetirizine (ZYRTEC) 10 MG tablet Take 10 mg by mouth daily.   Yes Reported, Patient   ibuprofen (ADVIL/MOTRIN) 200 MG tablet Take 2-3 tablets (400-600 mg) by mouth every 6 hours as needed for pain. 5/26/25  Yes Katharine Bernardo PA-C   minoxidil (LONITEN) 2.5 MG tablet Take 1.25 mg by mouth daily. Hair growth 10/29/24  Yes Reported, Patient   omeprazole (PRILOSEC) 20 MG DR capsule TAKE 1 CAPSULE BY MOUTH EVERY DAY 1/31/25  Yes Mansi Mccracken MD   oxyCODONE (ROXICODONE) 5 MG tablet Take 0.5-1 tablets (2.5-5 mg) by mouth every 6 hours as needed for moderate to severe pain. 5/26/25  Yes  "Katharine Bernardo PA-C   polyethylene glycol (MIRALAX) 17 GM/Dose powder Take 17 g (1 Capful) by mouth daily as needed for constipation. 5/26/25  Yes Katharine Bernardo PA-C   Semaglutide-Weight Management (WEGOVY) 2.4 MG/0.75ML pen Inject 2.4 mg subcutaneously once a week. 1/9/25  Yes Miki Nicholas MD   venlafaxine (EFFEXOR XR) 75 MG 24 hr capsule TAKE 1 CAPSULE BY MOUTH EVERY DAY 11/1/24  Yes Mansi Mccracken MD              Review of Systems:   The Review of Systems is negative other than noted in the HPI            Physical Exam:   Patient Vitals for the past 24 hrs:   BP Temp Temp src Pulse Resp SpO2 Height Weight   06/02/25 0729 118/57 99  F (37.2  C) Oral 92 16 92 % -- --   06/02/25 0400 108/57 99.2  F (37.3  C) Oral 98 21 96 % -- --   06/01/25 2351 118/56 -- -- 103 14 (!) 90 % -- --   06/01/25 2234 139/65 98.5  F (36.9  C) Oral 105 23 95 % -- --   06/01/25 1730 122/58 -- -- 99 -- 94 % -- --   06/01/25 1700 123/60 -- -- 99 -- 95 % -- --   06/01/25 1645 138/78 -- -- 99 -- 95 % -- --   06/01/25 1628 -- -- -- -- 16 95 % -- --   06/01/25 1626 (!) 176/89 98.1  F (36.7  C) -- 104 -- -- 1.575 m (5' 2\") 94.3 kg (208 lb)          Intake/Output Summary (Last 24 hours) at 6/2/2025 1112  Last data filed at 6/2/2025 0807  Gross per 24 hour   Intake 241.25 ml   Output --   Net 241.25 ml      Constitutional:   awake, alert, cooperative, no apparent distress, and appears stated age       Eyes:   PERRL, conjunctiva/corneas clear, EOM's intact; no scleral edema or icterus noted        ENT:   Normocephalic, without obvious abnormality, atraumatic, Lips, mucosa, and tongue normal      Lungs:   Normal respiratory effort, no accessory muscle use       Cardiovascular:   Regular rate and rhythm       Abdomen:   Soft, non distended with mild RLQ TTP adjacent to abdominal drain that is notable for adjacent erythema and warmth, no active purulence drainage but notable for trace mucopurulence to drain site periphery. No appreciable " mass. Abdominal drain with small volume serosanguinous OP, drain holding suction. Laparoscopic and pfannenstiel incision that are CDI with staples, no appreciable mass sensation or discharge.        Musculoskeletal:   No obvious swelling, bruising or deformity       Skin:   Skin color and texture normal for patient, no rashes or lesions              Data:         All imaging studies reviewed by me.    Results for orders placed or performed during the hospital encounter of 06/01/25 (from the past 24 hours)   CBC with Platelets & Differential    Narrative    The following orders were created for panel order CBC with Platelets & Differential.  Procedure                               Abnormality         Status                     ---------                               -----------         ------                     CBC with platelets and ...[[id number]]  Abnormal            Final result                 Please view results for these tests on the individual orders.   Basic metabolic panel   Result Value Ref Range    Sodium 137 135 - 145 mmol/L    Potassium 4.0 3.4 - 5.3 mmol/L    Chloride 97 (L) 98 - 107 mmol/L    Carbon Dioxide (CO2) 27 22 - 29 mmol/L    Anion Gap 13 7 - 15 mmol/L    Urea Nitrogen 12.6 8.0 - 23.0 mg/dL    Creatinine 0.63 0.51 - 0.95 mg/dL    GFR Estimate >90 >60 mL/min/1.73m2    Calcium 9.6 8.8 - 10.4 mg/dL    Glucose 103 (H) 70 - 99 mg/dL   Magnesium   Result Value Ref Range    Magnesium 1.5 (L) 1.7 - 2.3 mg/dL   Lipase   Result Value Ref Range    Lipase 23 13 - 60 U/L   Hepatic function panel   Result Value Ref Range    Protein Total 6.8 6.4 - 8.3 g/dL    Albumin 3.6 3.5 - 5.2 g/dL    Bilirubin Total 0.4 <=1.2 mg/dL    Alkaline Phosphatase 106 40 - 150 U/L    AST 27 0 - 45 U/L    ALT 26 0 - 50 U/L    Bilirubin Direct <0.08 0.00 - 0.30 mg/dL   Lactic acid whole blood   Result Value Ref Range    Lactic Acid 1.6 0.7 - 2.0 mmol/L   CBC with platelets and differential   Result Value Ref Range    WBC Count  16.5 (H) 4.0 - 11.0 10e3/uL    RBC Count 5.19 3.80 - 5.20 10e6/uL    Hemoglobin 14.3 11.7 - 15.7 g/dL    Hematocrit 43.0 35.0 - 47.0 %    MCV 83 78 - 100 fL    MCH 27.6 26.5 - 33.0 pg    MCHC 33.3 31.5 - 36.5 g/dL    RDW 14.0 10.0 - 15.0 %    Platelet Count 511 (H) 150 - 450 10e3/uL    % Neutrophils 75 %    % Lymphocytes 17 %    % Monocytes 5 %    % Eosinophils 2 %    % Basophils 1 %    % Immature Granulocytes 1 %    NRBCs per 100 WBC 0 <1 /100    Absolute Neutrophils 12.3 (H) 1.6 - 8.3 10e3/uL    Absolute Lymphocytes 2.8 0.8 - 5.3 10e3/uL    Absolute Monocytes 0.9 0.0 - 1.3 10e3/uL    Absolute Eosinophils 0.3 0.0 - 0.7 10e3/uL    Absolute Basophils 0.1 0.0 - 0.2 10e3/uL    Absolute Immature Granulocytes 0.1 <=0.4 10e3/uL    Absolute NRBCs 0.0 10e3/uL   CT Abdomen Pelvis w Contrast    Narrative    EXAM: CT ABDOMEN PELVIS W CONTRAST  LOCATION: [facility]  DATE: 6/1/2025    INDICATION: Abdominal pain.  COMPARISON: 12/30/2024  TECHNIQUE: CT scan of the abdomen and pelvis was performed following injection of IV contrast. Multiplanar reformats were obtained. Dose reduction techniques were used.  CONTRAST: Isovue 370 90 ML    FINDINGS:   LOWER CHEST: Scattered mild atelectasis in the lung bases. No consolidation or pleural effusion.    HEPATOBILIARY: Normal liver. Gallbladder is surgically absent. Mild intrahepatic biliary ductal dilatation is mildly increased, indeterminate.    PANCREAS: Normal.    SPLEEN: Normal.    ADRENAL GLANDS: Normal.    KIDNEYS/BLADDER: Early contrast excretion in bilateral renal collecting system excludes evaluation for urinary stones. Visually benign cysts in both kidneys do not require follow-up, largest 17 mm in the right kidney. No hydroureteronephrosis. A 3 mm   high attenuation focus is seen in the left renal pelvis (series 3 image 88), not definitively present on the prior study, but no corresponding filling defect is identified on delayed images. Bladder is  normal.    BOWEL: Status post partial colonic resection with colorectal anastomosis in the upper pelvis. A right abdominal approach drainage catheter traverses midline pelvis and terminates in the left abdomen. Significant fat stranding is seen along the course of   the drainage catheter in the right lower quadrant and right abdominal wall, without associated organized fluid collections. Mild wall thickening of the cecum and proximal ascending colon adjacent to this drainage catheter is noted, likely reactive   inflammation. Scattered colonic diverticula again seen without acute diverticulitis. No primary inflammatory bowel thickening. No bowel obstruction.    LYMPH NODES: Normal.    VASCULATURE: Mild aortoiliac atherosclerotic calcification. No abdominal aortic aneurysm.    PELVIC ORGANS: Normal.    MUSCULOSKELETAL: Mild anterior osteophyte formation in the spine. No suspicious osseous lesions or acute fractures. A small focal fluid collection without enhancing rim is seen in the lower ventral abdominal wall measuring 5 x 1.3 cm, with overlying   subcutaneous fat stranding along a transverse incision.        Impression    IMPRESSION:     1.  Postoperative changes of recent partial colectomy with colorectal anastomosis.    2.  5 cm focal fluid collection without enhancing rim seen in the lower ventral abdominal wall with overlying subcutaneous fat stranding, likely postoperative seroma.    3.  Significant fat stranding in the right abdominal wall and right lower quadrant along the course of a drainage catheter, nonspecific for inflammation versus infection. No associated fluid collections.    4.  Mild wall thickening of the cecum and ascending colon adjacent to aforementioned drainage catheter, most compatible with reactive inflammation.    5.  3 mm hyperattenuating focus in the left renal pelvis without corresponding filling defect on the delayed sequence, indeterminate and may represent a small blood clot.  Nevertheless, short-term follow-up CT of the abdomen and pelvis with intravenous   contrast in 3 months is recommended to ensure resolution.    6.  Mildly increased intrahepatic biliary ductal dilatation, nonspecific. Recommend correlation with bilirubin/liver function tests.    7.  Colonic diverticulosis without acute diverticulitis.   Magnesium   Result Value Ref Range    Magnesium 2.2 1.7 - 2.3 mg/dL   CBC with platelets differential    Narrative    The following orders were created for panel order CBC with platelets differential.  Procedure                               Abnormality         Status                     ---------                               -----------         ------                     CBC with platelets and ...[[id number]]  Abnormal            Final result                 Please view results for these tests on the individual orders.   Magnesium   Result Value Ref Range    Magnesium 2.1 1.7 - 2.3 mg/dL   Comprehensive metabolic panel   Result Value Ref Range    Sodium 137 135 - 145 mmol/L    Potassium 3.9 3.4 - 5.3 mmol/L    Carbon Dioxide (CO2) 30 (H) 22 - 29 mmol/L    Anion Gap 9 7 - 15 mmol/L    Urea Nitrogen 9.2 8.0 - 23.0 mg/dL    Creatinine 0.73 0.51 - 0.95 mg/dL    GFR Estimate >90 >60 mL/min/1.73m2    Calcium 8.8 8.8 - 10.4 mg/dL    Chloride 98 98 - 107 mmol/L    Glucose 110 (H) 70 - 99 mg/dL    Alkaline Phosphatase 106 40 - 150 U/L    AST 19 0 - 45 U/L    ALT 25 0 - 50 U/L    Protein Total 6.0 (L) 6.4 - 8.3 g/dL    Albumin 3.4 (L) 3.5 - 5.2 g/dL    Bilirubin Total 0.4 <=1.2 mg/dL   CRP inflammation   Result Value Ref Range    CRP Inflammation 158.20 (H) <5.00 mg/L   CBC with platelets and differential   Result Value Ref Range    WBC Count 16.5 (H) 4.0 - 11.0 10e3/uL    RBC Count 4.72 3.80 - 5.20 10e6/uL    Hemoglobin 13.3 11.7 - 15.7 g/dL    Hematocrit 40.0 35.0 - 47.0 %    MCV 85 78 - 100 fL    MCH 28.2 26.5 - 33.0 pg    MCHC 33.3 31.5 - 36.5 g/dL    RDW 14.2 10.0 - 15.0 %    Platelet  Count 482 (H) 150 - 450 10e3/uL    % Neutrophils 78 %    % Lymphocytes 14 %    % Monocytes 6 %    % Eosinophils 1 %    % Basophils 0 %    % Immature Granulocytes 1 %    NRBCs per 100 WBC 0 <1 /100    Absolute Neutrophils 12.9 (H) 1.6 - 8.3 10e3/uL    Absolute Lymphocytes 2.3 0.8 - 5.3 10e3/uL    Absolute Monocytes 1.0 0.0 - 1.3 10e3/uL    Absolute Eosinophils 0.1 0.0 - 0.7 10e3/uL    Absolute Basophils 0.1 0.0 - 0.2 10e3/uL    Absolute Immature Granulocytes 0.1 <=0.4 10e3/uL    Absolute NRBCs 0.0 10e3/uL           Kim Torres PA-C  [facility]  Surgery [address] [address]?  Office: [phone]

## 2025-06-02 NOTE — PROGRESS NOTES
[facility]    Medicine Progress Note - Hospitalist Service    Date of Admission:  6/1/2025    Assessment & Plan   Gia Sultana is a 59-year-old female with past medical history of obesity, mood disorder and history of diverticulitis with perforation that required IR placement of drainage tube and recent hospitalization for planned sigmoid colectomy with splenic flexure takedown on 5/22/2025 who presented to the emergency room for worsening abdominal pain.  Found to have postoperative fluid collection with seroma.     Abdominal pain with postoperative seroma  Concerns for cellulitis around drainage site  - S/P uncomplicated Robot assisted laparoscopic sigmoid colectomy with splenic flexure takedown on 5/22/2025  -CT abdomen shows 5 cm focal fluid collection without enhancing rim seen in the lower ventral abdominal wall with overlying subcutaneous fat stranding, likely postoperative seroma  -Normal lactic acid but elevated white blood cells, CRP elevated 158.2  - Surgical consult, appreciate input: Removed drain 6/2  - Continue IV antibiotic empirically with Flagyl and Cipro  -Start with clear liquid diet given recent emesis and advance to low fiber diet as tolerated  - Received IV fluids 6/1 to 6/2, now discontinued  -IV Dilaudid as needed    Depression/anxiety  - Continue PTA venlafaxine    Hypomagnesemia  -Replace per protocol    Obesity class II  - PTA Wegovy on hold while inpatient     Hair loss  -Continue PTA minoxidil        Diet: Low Fiber Diet    DVT Prophylaxis: Pneumatic Compression Devices  Curiel Catheter: Not present  Lines: None     Cardiac Monitoring: ACTIVE order. Indication: Surgery  Code Status: Full Code      Clinically Significant Risk Factors Present on Admission          # Hypochloremia: Lowest Cl = 97 mmol/L in last 2 days, will monitor as appropriate    # Hypomagnesemia: Lowest Mg = 1.5 mg/dL in last 2 days, will replace as needed   # Hypoalbuminemia: Lowest albumin =  "3.4 g/dL at 6/2/2025  4:38 AM, will monitor as appropriate     # Hypertension: Noted on problem list           # Obesity: Estimated body mass index is 38.04 kg/m  as calculated from the following:    Height as of this encounter: 1.575 m (5' 2\").    Weight as of this encounter: 94.3 kg (208 lb).              Social Drivers of Health    Tobacco Use: High Risk (5/22/2025)    Patient History     Smoking Tobacco Use: Every Day     Smokeless Tobacco Use: Never          Disposition Plan     Medically Ready for Discharge: Anticipated Tomorrow             Cele Nelson MD  Hospitalist Service  [facility]  Securely message with [employer] (more info)  Text page via [facility] Paging/Directory   ______________________________________________________________________    Interval History   Mr. Rae is doing okay today.  She continues to have abdominal pain without guarding or significant distention.  There is some redness around the drainage site.  Later today the drain was removed by surgery.  She continues on IV antibiotics.  Plan to restart diet today.  She states when she had food last she vomited so we will start with a clear liquid diet and advance to low fiber diet as tolerated.  Possible discharge home tomorrow.    Physical Exam   Vital Signs: Temp: 98.9  F (37.2  C) Temp src: Oral BP: 126/60 Pulse: 94   Resp: 16 SpO2: 92 % O2 Device: None (Room air) Oxygen Delivery: 2 LPM  Weight: 208 lbs 0 oz    General Appearance: Awake, alert, in no acute distress  Respiratory: CTAB, no wheeze  Cardiovascular: RRR, no murmur noted  GI: Appropriately tender, nondistended  Skin: no jaundice, redness in the area of the drain site    Medical Decision Making       50 MINUTES SPENT BY ME on the date of service doing chart review, history, exam, documentation & further activities per the note.      Data     I have personally reviewed the following data over the past 24 hrs:    16.5 (H)  \   13.3   / 482 (H)     137 98 " 9.2 /  110 (H)   3.9 30 (H) 0.73 \     ALT: 25 AST: 19 AP: 106 TBILI: 0.4   ALB: 3.4 (L) TOT PROTEIN: 6.0 (L) LIPASE: 23     Procal: N/A CRP: 158.20 (H) Lactic Acid: 1.6         Imaging results reviewed over the past 24 hrs:   Recent Results (from the past 24 hours)   CT Abdomen Pelvis w Contrast    Narrative    EXAM: CT ABDOMEN PELVIS W CONTRAST  LOCATION: [facility]  DATE: 6/1/2025    INDICATION: Abdominal pain.  COMPARISON: 12/30/2024  TECHNIQUE: CT scan of the abdomen and pelvis was performed following injection of IV contrast. Multiplanar reformats were obtained. Dose reduction techniques were used.  CONTRAST: Isovue 370 90 ML    FINDINGS:   LOWER CHEST: Scattered mild atelectasis in the lung bases. No consolidation or pleural effusion.    HEPATOBILIARY: Normal liver. Gallbladder is surgically absent. Mild intrahepatic biliary ductal dilatation is mildly increased, indeterminate.    PANCREAS: Normal.    SPLEEN: Normal.    ADRENAL GLANDS: Normal.    KIDNEYS/BLADDER: Early contrast excretion in bilateral renal collecting system excludes evaluation for urinary stones. Visually benign cysts in both kidneys do not require follow-up, largest 17 mm in the right kidney. No hydroureteronephrosis. A 3 mm   high attenuation focus is seen in the left renal pelvis (series 3 image 88), not definitively present on the prior study, but no corresponding filling defect is identified on delayed images. Bladder is normal.    BOWEL: Status post partial colonic resection with colorectal anastomosis in the upper pelvis. A right abdominal approach drainage catheter traverses midline pelvis and terminates in the left abdomen. Significant fat stranding is seen along the course of   the drainage catheter in the right lower quadrant and right abdominal wall, without associated organized fluid collections. Mild wall thickening of the cecum and proximal ascending colon adjacent to this drainage catheter is noted,  likely reactive   inflammation. Scattered colonic diverticula again seen without acute diverticulitis. No primary inflammatory bowel thickening. No bowel obstruction.    LYMPH NODES: Normal.    VASCULATURE: Mild aortoiliac atherosclerotic calcification. No abdominal aortic aneurysm.    PELVIC ORGANS: Normal.    MUSCULOSKELETAL: Mild anterior osteophyte formation in the spine. No suspicious osseous lesions or acute fractures. A small focal fluid collection without enhancing rim is seen in the lower ventral abdominal wall measuring 5 x 1.3 cm, with overlying   subcutaneous fat stranding along a transverse incision.        Impression    IMPRESSION:     1.  Postoperative changes of recent partial colectomy with colorectal anastomosis.    2.  5 cm focal fluid collection without enhancing rim seen in the lower ventral abdominal wall with overlying subcutaneous fat stranding, likely postoperative seroma.    3.  Significant fat stranding in the right abdominal wall and right lower quadrant along the course of a drainage catheter, nonspecific for inflammation versus infection. No associated fluid collections.    4.  Mild wall thickening of the cecum and ascending colon adjacent to aforementioned drainage catheter, most compatible with reactive inflammation.    5.  3 mm hyperattenuating focus in the left renal pelvis without corresponding filling defect on the delayed sequence, indeterminate and may represent a small blood clot. Nevertheless, short-term follow-up CT of the abdomen and pelvis with intravenous   contrast in 3 months is recommended to ensure resolution.    6.  Mildly increased intrahepatic biliary ductal dilatation, nonspecific. Recommend correlation with bilirubin/liver function tests.    7.  Colonic diverticulosis without acute diverticulitis.

## 2025-06-02 NOTE — PROGRESS NOTES
Pt alert and oriented times 4. Vitals mostly stable except low grade temp. Pt c/o right lower abdomen pain. Dressing intact on right side. Charge nurse helped and gave her prn IV dilaudid for pain. Pain came slightly down after pain medication. Placed new IV and removed old one because old one was painful per pt. Tele sinus tach up to 104. Family at bedside. Report given to p1 nurse.

## 2025-06-02 NOTE — ED NOTES
Bed: JNED-39  Expected date:   Expected time:   Means of arrival:   Comments:  BOARDER ROC AGUAYO

## 2025-06-02 NOTE — PLAN OF CARE
Problem: Adult Inpatient Plan of Care  Goal: Absence of Hospital-Acquired Illness or Injury  Intervention: Prevent and Manage VTE (Venous Thromboembolism) Risk  Recent Flowsheet Documentation  Taken 6/2/2025 1300 by Mary Leon RN  VTE Prevention/Management: SCDs on (sequential compression devices)     Problem: Adult Inpatient Plan of Care  Goal: Optimal Comfort and Wellbeing  Intervention: Monitor Pain and Promote Comfort  Recent Flowsheet Documentation  Taken 6/2/2025 0807 by Mary Leon RN  Pain Management Interventions:   medication (see MAR)   care clustered     Problem: Pain Acute  Goal: Optimal Pain Control and Function  Outcome: Progressing  Intervention: Develop Pain Management Plan  Recent Flowsheet Documentation  Taken 6/2/2025 0807 by Mary Leon RN  Pain Management Interventions:   medication (see MAR)   care clustered  Intervention: Prevent or Manage Pain  Recent Flowsheet Documentation  Taken 6/2/2025 1215 by Mary Leon RN  Medication Review/Management: medications reviewed  Taken 6/2/2025 0807 by Mary Leon RN  Medication Review/Management: medications reviewed     Problem: Infection  Goal: Absence of Infection Signs and Symptoms  Outcome: Progressing   Goal Outcome Evaluation:      Patient's pain well controlled with Dilaudid IV. Continue with IV antibiotics. Up with SBA to bathroom. Drain removed by surgery. Diet changed to low fiber per surgery team. VSS. IV saline locked patient knows to push fluids. If she isn't drinking well enough we will notify Dr. Nelson.Patient and  updated on plan of care.

## 2025-06-02 NOTE — PLAN OF CARE
Goal Outcome Evaluation:       Patient settled in bed. NS at 75 ml started. Informed of NPO status after MN.

## 2025-06-02 NOTE — ED NOTES
Patient walked to the bathroom independently. She states that her abdomen hurts more when she is moving, but pain level is tolerable.     Patient emptied her drain while in the restroom.

## 2025-06-02 NOTE — PLAN OF CARE
Problem: Adult Inpatient Plan of Care  Goal: Absence of Hospital-Acquired Illness or Injury  Intervention: Prevent Infection  Recent Flowsheet Documentation  Taken 6/2/2025 1751 by Tameka Mishra, RN  Infection Prevention:   equipment surfaces disinfected   hand hygiene promoted   rest/sleep promoted   single patient room provided     Problem: Adult Inpatient Plan of Care  Goal: Optimal Comfort and Wellbeing  Outcome: Progressing  Intervention: Monitor Pain and Promote Comfort  Recent Flowsheet Documentation  Taken 6/2/2025 1751 by Tameka Mishra, RN  Pain Management Interventions: medication (see MAR)   Goal Outcome Evaluation:      Plan of Care Reviewed With: patient, spouse, child    Overall Patient Progress: improvingOverall Patient Progress: improving       Patient admit from ED. Stated pain 4/10, new order for PO dilaudid administered x1. Stated nausea in ED, denied at this time. 1 BM shortly after arriving on unit. Gait steady increased pain when getting out of bed.

## 2025-06-02 NOTE — PLAN OF CARE
[facility] - ICU    RN Progress Note:            Pertinent Assessments:      Please refer to flowsheet rows for full assessment     A/Ox4, follows commands, able to make needs known, SR, BP appropriate, 2LNC, RLQ tender to touch, voids appropriately, minimal BRITT drain output           Key Events - This Shift:       Moderately controlled pain with 0.5 of Diluaidid, provider notified of break through pain     RN Managed Protocols Ordered:  Yes  Protocols:Magnesium  PRN'S:  Protocols Status: Endorsed to Oncoming RN                Barriers to Discharge / Downgrade:     Possible surgical intervention

## 2025-06-03 ENCOUNTER — APPOINTMENT (OUTPATIENT)
Dept: OCCUPATIONAL THERAPY | Facility: HOSPITAL | Age: 60
End: 2025-06-03
Attending: INTERNAL MEDICINE
Payer: COMMERCIAL

## 2025-06-03 ENCOUNTER — APPOINTMENT (OUTPATIENT)
Dept: PHYSICAL THERAPY | Facility: HOSPITAL | Age: 60
End: 2025-06-03
Attending: INTERNAL MEDICINE
Payer: COMMERCIAL

## 2025-06-03 LAB
ALBUMIN UR-MCNC: 20 MG/DL
ANION GAP SERPL CALCULATED.3IONS-SCNC: 10 MMOL/L (ref 7–15)
APPEARANCE UR: ABNORMAL
BACTERIA #/AREA URNS HPF: ABNORMAL /HPF
BILIRUB UR QL STRIP: NEGATIVE
BUN SERPL-MCNC: 6.6 MG/DL (ref 8–23)
C DIFF GDH STL QL IA: POSITIVE
C DIFF TOX A+B STL QL IA: NEGATIVE
C DIFF TOX B STL QL: POSITIVE
CALCIUM SERPL-MCNC: 9.2 MG/DL (ref 8.8–10.4)
CHLORIDE SERPL-SCNC: 99 MMOL/L (ref 98–107)
COLOR UR AUTO: YELLOW
CREAT SERPL-MCNC: 0.67 MG/DL (ref 0.51–0.95)
EGFRCR SERPLBLD CKD-EPI 2021: >90 ML/MIN/1.73M2
ERYTHROCYTE [DISTWIDTH] IN BLOOD BY AUTOMATED COUNT: 14.2 % (ref 10–15)
GLUCOSE SERPL-MCNC: 128 MG/DL (ref 70–99)
GLUCOSE UR STRIP-MCNC: 30 MG/DL
HCO3 SERPL-SCNC: 27 MMOL/L (ref 22–29)
HCT VFR BLD AUTO: 39.2 % (ref 35–47)
HGB BLD-MCNC: 13 G/DL (ref 11.7–15.7)
HGB UR QL STRIP: ABNORMAL
KETONES UR STRIP-MCNC: 10 MG/DL
LABORATORY COMMENT REPORT: ABNORMAL
LEUKOCYTE ESTERASE UR QL STRIP: ABNORMAL
MAGNESIUM SERPL-MCNC: 1.9 MG/DL (ref 1.7–2.3)
MCH RBC QN AUTO: 28.3 PG (ref 26.5–33)
MCHC RBC AUTO-ENTMCNC: 33.2 G/DL (ref 31.5–36.5)
MCV RBC AUTO: 85 FL (ref 78–100)
MUCOUS THREADS #/AREA URNS LPF: PRESENT /LPF
NITRATE UR QL: NEGATIVE
PH UR STRIP: 5.5 [PH] (ref 5–7)
PLATELET # BLD AUTO: 482 10E3/UL (ref 150–450)
POTASSIUM SERPL-SCNC: 3.8 MMOL/L (ref 3.4–5.3)
RBC # BLD AUTO: 4.6 10E6/UL (ref 3.8–5.2)
RBC URINE: 14 /HPF
SODIUM SERPL-SCNC: 136 MMOL/L (ref 135–145)
SP GR UR STRIP: 1.02 (ref 1–1.03)
SQUAMOUS EPITHELIAL: 10 /HPF
TRANSITIONAL EPI: <1 /HPF
UROBILINOGEN UR STRIP-MCNC: NORMAL MG/DL
WBC # BLD AUTO: 25.5 10E3/UL (ref 4–11)
WBC URINE: 70 /HPF

## 2025-06-03 PROCEDURE — 36415 COLL VENOUS BLD VENIPUNCTURE: CPT | Performed by: INTERNAL MEDICINE

## 2025-06-03 PROCEDURE — 85018 HEMOGLOBIN: CPT | Performed by: INTERNAL MEDICINE

## 2025-06-03 PROCEDURE — 81001 URINALYSIS AUTO W/SCOPE: CPT | Performed by: SURGERY

## 2025-06-03 PROCEDURE — 250N000011 HC RX IP 250 OP 636: Performed by: SURGERY

## 2025-06-03 PROCEDURE — 250N000013 HC RX MED GY IP 250 OP 250 PS 637: Performed by: INTERNAL MEDICINE

## 2025-06-03 PROCEDURE — 120N000001 HC R&B MED SURG/OB

## 2025-06-03 PROCEDURE — 97535 SELF CARE MNGMENT TRAINING: CPT | Mod: GO

## 2025-06-03 PROCEDURE — 258N000003 HC RX IP 258 OP 636: Performed by: SURGERY

## 2025-06-03 PROCEDURE — 80048 BASIC METABOLIC PNL TOTAL CA: CPT | Performed by: INTERNAL MEDICINE

## 2025-06-03 PROCEDURE — 97161 PT EVAL LOW COMPLEX 20 MIN: CPT | Mod: GP

## 2025-06-03 PROCEDURE — 83735 ASSAY OF MAGNESIUM: CPT | Performed by: INTERNAL MEDICINE

## 2025-06-03 PROCEDURE — 99232 SBSQ HOSP IP/OBS MODERATE 35: CPT | Performed by: INTERNAL MEDICINE

## 2025-06-03 PROCEDURE — 85014 HEMATOCRIT: CPT | Performed by: INTERNAL MEDICINE

## 2025-06-03 PROCEDURE — 97165 OT EVAL LOW COMPLEX 30 MIN: CPT | Mod: GO

## 2025-06-03 PROCEDURE — 87086 URINE CULTURE/COLONY COUNT: CPT | Performed by: SURGERY

## 2025-06-03 PROCEDURE — 87324 CLOSTRIDIUM AG IA: CPT

## 2025-06-03 PROCEDURE — 250N000011 HC RX IP 250 OP 636: Mod: JZ | Performed by: INTERNAL MEDICINE

## 2025-06-03 PROCEDURE — 87641 MR-STAPH DNA AMP PROBE: CPT | Performed by: INTERNAL MEDICINE

## 2025-06-03 PROCEDURE — 87493 C DIFF AMPLIFIED PROBE: CPT

## 2025-06-03 RX ORDER — CEFAZOLIN SODIUM 1 G/50ML
20 SOLUTION INTRAVENOUS ONCE
Status: COMPLETED | OUTPATIENT
Start: 2025-06-03 | End: 2025-06-03

## 2025-06-03 RX ORDER — VANCOMYCIN HYDROCHLORIDE 125 MG/1
125 CAPSULE ORAL 4 TIMES DAILY
Status: DISCONTINUED | OUTPATIENT
Start: 2025-06-03 | End: 2025-06-03

## 2025-06-03 RX ORDER — MAGNESIUM OXIDE 400 MG/1
400 TABLET ORAL EVERY 4 HOURS
Status: COMPLETED | OUTPATIENT
Start: 2025-06-03 | End: 2025-06-03

## 2025-06-03 RX ORDER — CEFAZOLIN SODIUM 1 G/50ML
1250 SOLUTION INTRAVENOUS EVERY 12 HOURS
Status: DISCONTINUED | OUTPATIENT
Start: 2025-06-03 | End: 2025-06-04

## 2025-06-03 RX ADMIN — ONDANSETRON 4 MG: 2 INJECTION, SOLUTION INTRAMUSCULAR; INTRAVENOUS at 09:54

## 2025-06-03 RX ADMIN — ACETAMINOPHEN 650 MG: 325 TABLET ORAL at 15:02

## 2025-06-03 RX ADMIN — HYDROMORPHONE HYDROCHLORIDE 1 MG: 2 TABLET ORAL at 15:01

## 2025-06-03 RX ADMIN — HYDROMORPHONE HYDROCHLORIDE 1 MG: 2 TABLET ORAL at 19:17

## 2025-06-03 RX ADMIN — MAGNESIUM OXIDE TAB 400 MG (241.3 MG ELEMENTAL MG) 400 MG: 400 (241.3 MG) TAB at 13:00

## 2025-06-03 RX ADMIN — CIPROFLOXACIN 400 MG: 400 INJECTION, SOLUTION INTRAVENOUS at 20:32

## 2025-06-03 RX ADMIN — CETIRIZINE HYDROCHLORIDE 10 MG: 10 TABLET, FILM COATED ORAL at 09:29

## 2025-06-03 RX ADMIN — ACETAMINOPHEN 650 MG: 325 TABLET ORAL at 09:53

## 2025-06-03 RX ADMIN — SODIUM CHLORIDE 1250 MG: 0.9 INJECTION, SOLUTION INTRAVENOUS at 22:21

## 2025-06-03 RX ADMIN — MINOXIDIL 1.25 MG: 2.5 TABLET ORAL at 20:40

## 2025-06-03 RX ADMIN — ACETAMINOPHEN 650 MG: 325 TABLET ORAL at 19:16

## 2025-06-03 RX ADMIN — CIPROFLOXACIN 400 MG: 400 INJECTION, SOLUTION INTRAVENOUS at 09:31

## 2025-06-03 RX ADMIN — MAGNESIUM OXIDE TAB 400 MG (241.3 MG ELEMENTAL MG) 400 MG: 400 (241.3 MG) TAB at 09:29

## 2025-06-03 RX ADMIN — VANCOMYCIN HYDROCHLORIDE 125 MG: 125 CAPSULE ORAL at 20:40

## 2025-06-03 RX ADMIN — METRONIDAZOLE 500 MG: 500 INJECTION, SOLUTION INTRAVENOUS at 21:34

## 2025-06-03 RX ADMIN — VENLAFAXINE HYDROCHLORIDE 75 MG: 75 CAPSULE, EXTENDED RELEASE ORAL at 20:39

## 2025-06-03 RX ADMIN — SODIUM CHLORIDE 2000 MG: 0.9 INJECTION, SOLUTION INTRAVENOUS at 13:00

## 2025-06-03 RX ADMIN — HYDROMORPHONE HYDROCHLORIDE 2 MG: 2 TABLET ORAL at 03:17

## 2025-06-03 RX ADMIN — HYDROMORPHONE HYDROCHLORIDE 1 MG: 2 TABLET ORAL at 09:29

## 2025-06-03 RX ADMIN — METRONIDAZOLE 500 MG: 500 INJECTION, SOLUTION INTRAVENOUS at 10:54

## 2025-06-03 RX ADMIN — PANTOPRAZOLE SODIUM 40 MG: 40 TABLET, DELAYED RELEASE ORAL at 19:17

## 2025-06-03 ASSESSMENT — ACTIVITIES OF DAILY LIVING (ADL)
ADLS_ACUITY_SCORE: 38

## 2025-06-03 NOTE — PROGRESS NOTES
Physical Therapy        06/03/25 1440   Appointment Info   Signing Clinician's Name / Credentials (PT) Gabriella Pérez, PT, DPT   Living Environment   People in Home spouse   Current Living Arrangements house   Home Accessibility stairs to enter home   Number of Stairs, Main Entrance 4   Stair Railings, Main Entrance railings safe and in good condition   Living Environment Comments spouse doing laundry which is downstairs   Self-Care   Equipment Currently Used at Home none   Activity/Exercise/Self-Care Comment needing PRN assist from spouse since initial surgery d/t pain. previously IND.   General Information   Onset of Illness/Injury or Date of Surgery 06/02/25   Referring Physician Cele Nelson MD   Patient/Family Therapy Goals Statement (PT) d/c home   Pertinent History of Current Problem (include personal factors and/or comorbidities that impact the POC) 59-year-old female with past medical history of obesity and perforated diverticulitis who is status post elective robotic assisted laparoscopic sigmoidectomy 5/22/2025 who is presenting due to worsening postoperative pain.   Existing Precautions/Restrictions abdominal   Cognition   Affect/Mental Status (Cognition) WFL   Pain Assessment   Patient Currently in Pain No   Integumentary/Edema   Integumentary/Edema no deficits were identifed   Posture    Posture Not impaired   Range of Motion (ROM)   Range of Motion ROM is WFL   Strength (Manual Muscle Testing)   Strength (Manual Muscle Testing) strength is WFL   Bed Mobility   Comment, (Bed Mobility) discussed log roll for supine to sit to decrease pain. pt states it hurts to roll to her side because that's where it hurts, and that her spouse will be home with her and has been helping her out of bed. Spouse demonstrated assisting pt with supine to sit from flat bed without rail/   Transfers   Comment, (Transfers) independent sit<>stand   Gait/Stairs (Locomotion)   Comment, (Gait/Stairs) 150' with SBA, decreased  pace but steady. 3 stairs, nonreciprocal with railing on left   Balance   Balance no deficits were identified   Sensory Examination   Sensory Perception patient reports no sensory changes   Coordination   Coordination other (see comments)   Muscle Tone   Muscle Tone no deficits were identified   Clinical Impression   Criteria for Skilled Therapeutic Intervention Evaluation only   Clinical Presentation (PT Evaluation Complexity) stable   Clinical Presentation Rationale presents as medically diagnosed   Clinical Decision Making (Complexity) low complexity   Risk & Benefits of therapy have been explained evaluation/treatment results reviewed;participants voiced agreement with care plan;participants included;patient   PT Total Evaluation Time   PT Eval, Low Complexity Minutes (64771) 10   PT Discharge Planning   PT Discharge Recommendation (DC Rec) home   PT Rationale for DC Rec no needs   PT Brief overview of current status passed PT   Physical Therapy Time and Intention   Total Session Time (sum of timed and untimed services) 10         Gabriella Pérez, MARTINA 6/3/2025

## 2025-06-03 NOTE — PROGRESS NOTES
[facility]    Medicine Progress Note - Hospitalist Service    Date of Admission:  6/1/2025    Assessment & Plan   Gia Sultana is a 59-year-old female with past medical history of obesity, mood disorder and history of diverticulitis with perforation that required IR placement of drainage tube and recent hospitalization for planned sigmoid colectomy with splenic flexure takedown on 5/22/2025 who presented to the emergency room for worsening abdominal pain.  Found to have postoperative fluid collection with seroma.     Abdominal pain with postoperative seroma  Cellulitis around drainage site of abdominal wall  Leukocytosis   - S/P uncomplicated Robot assisted laparoscopic sigmoid colectomy with splenic flexure takedown on 5/22/2025  -CT abdomen shows 5 cm focal fluid collection without enhancing rim seen in the lower ventral abdominal wall with overlying subcutaneous fat stranding, likely postoperative seroma  -Normal lactic acid but elevated white blood cells, CRP elevated 158.2  - Surgical consult, appreciate input: Removed drain 6/2  - Continue IV antibiotic empirically with Flagyl and Cipro, vancomycin started 6/3  -low fiber diet  - Received IV fluids 6/1 to 6/2, now discontinued  -oral Dilaudid as needed  - UA contaminated with 10 squamous cells  - Ucx pending  - C. Dif pending  - MRSA swab ordered  - ID consulted    Depression/anxiety  - Continue PTA venlafaxine    Hypomagnesemia  -Replace per protocol    Obesity class II  - PTA Wegovy on hold while inpatient     Hair loss  -Continue PTA minoxidil          Diet: Low Fiber Diet    DVT Prophylaxis: Pneumatic Compression Devices  Curiel Catheter: Not present  Lines: None     Cardiac Monitoring: ACTIVE order. Indication: Surgery  Code Status: Full Code      Clinically Significant Risk Factors               # Hypoalbuminemia: Lowest albumin = 3.4 g/dL at 6/2/2025  4:38 AM, will monitor as appropriate     # Hypertension: Noted on problem list  "           # Obesity: Estimated body mass index is 38.87 kg/m  as calculated from the following:    Height as of this encounter: 1.575 m (5' 2\").    Weight as of this encounter: 96.4 kg (212 lb 8 oz)., PRESENT ON ADMISSION            Social Drivers of Health    Tobacco Use: High Risk (5/22/2025)    Patient History     Smoking Tobacco Use: Every Day     Smokeless Tobacco Use: Never          Disposition Plan     Medically Ready for Discharge: Anticipated in 2-4 Days             Cele Nelson MD  Hospitalist Service  [facility]  Securely message with [employer] (more info)  Text page via [facility] Paging/Directory   ______________________________________________________________________    Interval History   Doing okay today. She was getting cleaned up when I saw her with her . Leukocytosis has worsened. She is having significant recurrent urination of small amounts. UA was very contaminated but unrine culture is pending. Also getting a C. Dif but not having significant diarrhea. Vancomycin added to cover for cellulitis. MRSA swab ordered.     Physical Exam   Vital Signs: Temp: 99.3  F (37.4  C) Temp src: Oral BP: 123/58 Pulse: 100   Resp: 18 SpO2: (!) 91 % O2 Device: None (Room air)    Weight: 212 lbs 8 oz      Medical Decision Making       45 MINUTES SPENT BY ME on the date of service doing chart review, history, exam, documentation & further activities per the note.      Data     I have personally reviewed the following data over the past 24 hrs:    25.5 (H)  \   13.0   / 482 (H)     136 99 6.6 (L) /  128 (H)   3.8 27 0.67 \       Imaging results reviewed over the past 24 hrs:   No results found for this or any previous visit (from the past 24 hours).  "

## 2025-06-03 NOTE — PHARMACY-VANCOMYCIN DOSING SERVICE
Pharmacy Vancomycin Initial Note  Date of Service Luz 3, 2025  Patient's  1965  59 year old, female    Indication: Skin and Soft Tissue Infection    Current estimated CrCl = Estimated Creatinine Clearance: 96.7 mL/min (based on SCr of 0.67 mg/dL).    Creatinine for last 3 days  2025:  5:15 PM Creatinine 0.63 mg/dL  2025:  4:38 AM Creatinine 0.73 mg/dL  6/3/2025:  6:51 AM Creatinine 0.67 mg/dL    Recent Vancomycin Level(s) for last 3 days  No results found for requested labs within last 3 days.      Vancomycin IV Administrations (past 72 hours)        No vancomycin orders with administrations in past 72 hours.                    Nephrotoxins and other renal medications (From now, onward)      Start     Dose/Rate Route Frequency Ordered Stop    25 2130  vancomycin (VANCOCIN) 1,250 mg in sodium chloride 0.9 % 250 mL intermittent infusion         1,250 mg  over 90 Minutes Intravenous EVERY 12 HOURS 25 0926      25 1000  vancomycin (VANCOCIN) 2,000 mg in sodium chloride 0.9 % 500 mL intermittent infusion         20 mg/kg × 96.4 kg  over 2 Hours Intravenous ONCE 25 0925              Contrast Orders - past 72 hours (72h ago, onward)      Start     Dose/Rate Route Frequency Stop    25 1830  iopamidol (ISOVUE-370) solution 90 mL         90 mL Intravenous ONCE 25 1801            [employer] Prediction of Planned Initial Vancomycin Regimen  Loading dose: 2000 mg at 10:00 2025.  Regimen: 1250 mg IV every 12 hours.  Start time: 21:00 on 2025  Exposure target: AUC24 (range) 400-600 mg/L.hr   AUC24,ss: 500 mg/L.hr  Probability of AUC24 > 400: 73 %  Ctrough,ss: 15.6 mg/L  Probability of Ctrough,ss > 20: 30 %  Probability of nephrotoxicity (Lodise DEJUAN ): 11 %          Plan:  Start vancomycin  2000 mg IV once then 1250 mg IV q12h.   Vancomycin monitoring method: AUC  Vancomycin therapeutic monitoring goal: 400-600 mg*h/L  Pharmacy will check vancomycin levels as  appropriate in 1-3 Days.    Serum creatinine level ordered until 6/6.    Zeynep Mcintosh RPH

## 2025-06-03 NOTE — PROGRESS NOTES
06/03/25 0750   Appointment Info   Signing Clinician's Name / Credentials (OT) Elza Woodward OTR/L   Living Environment   People in Home spouse   Current Living Arrangements house   Self-Care   Equipment Currently Used at Home none   Fall history within last six months no   Activity/Exercise/Self-Care Comment needing PRN assist from spouse since initial surgery d/t pain. previously IND.   Instrumental Activities of Daily Living (IADL)   IADL Comments works as a teacher, off for the summer. IND for IADLs at baseline   General Information   Onset of Illness/Injury or Date of Surgery 06/01/25   Referring Physician Cele Nelson MD   Patient/Family Therapy Goal Statement (OT) less pain   Additional Occupational Profile Info/Pertinent History of Current Problem PMH obesity, mood disorder and history of diverticulitis with perforation that required IR placement of drainage tube and recent hospitalization for planned sigmoid colectomy with splenic flexure takedown on 5/22/2025 who presented to the emergency room for worsening abdominal pain.  Found to have postoperative fluid collection with seroma.   Existing Precautions/Restrictions abdominal   Cognitive Status Examination   Orientation Status orientation to person, place and time   Affect/Mental Status (Cognitive) flat/blunted affect   Follows Commands WFL   Pain Assessment   Patient Currently in Pain Yes, see Vital Sign flowsheet   Range of Motion Comprehensive   General Range of Motion bilateral upper extremity ROM WFL   Strength Comprehensive (MMT)   Comment, General Manual Muscle Testing (MMT) Assessment WFL   Bed Mobility   Bed Mobility supine-sit;sit-supine   Supine-Sit [place] (Bed Mobility) minimum assist (75% patient effort)   Sit-Supine [place] (Bed Mobility) minimum assist (75% patient effort)   Assistive Device (Bed Mobility) bed rails   Transfers   Transfers sit-stand transfer;toilet transfer   Sit-Stand Transfer   Sit-Stand [place]  (Transfers) contact guard;verbal cues   Toilet Transfer   Type (Toilet Transfer) sit-stand;stand-sit   [place] Level (Toilet Transfer) contact guard;verbal cues   Activities of Daily Living   BADL Assessment/Intervention lower body dressing;toileting   Lower Body Dressing Assessment/Training   [place] Level (Lower Body Dressing) minimum assist (75% patient effort);verbal cues   Toileting   [place] Level (Toileting) minimum assist (75% patient effort);verbal cues   Clinical Impression   Criteria for Skilled Therapeutic Interventions Met (OT) Yes, treatment indicated   OT Diagnosis dec BADL IND   OT Problem List-Impairments impacting ADL problems related to;activity tolerance impaired;balance;pain;post-surgical precautions   Assessment of Occupational Performance 5 or more Performance Deficits   Identified Performance Deficits dressing, toileting, bathing, household mobility, functional transfers, household management, meal prep   Planned Therapy Interventions (OT) ADL retraining;bed mobility training;transfer training;progressive activity/exercise;home program guidelines   Clinical Decision Making Complexity (OT) problem focused assessment/low complexity   Risk & Benefits of therapy have been explained evaluation/treatment results reviewed;participants included;patient   OT Total Evaluation Time   OT Eval, Low Complexity Minutes (10123) 10   OT Goals   Therapy Frequency (OT) 5 times/week   OT Predicted Duration/Target Date for Goal Attainment 06/10/25   OT Goals Lower Body Dressing;Toilet Transfer/Toileting   OT: Lower Body Dressing Independent;within precautions   OT: Toilet Transfer/Toileting Modified independent;toilet transfer;cleaning and garment management;within precautions   Self-Care/Home Management   Self-Care/Home Mgmt/ADL, Compensatory, Meal Prep Minutes (27052) 14   Symptoms Noted During/After Treatment (Meal Preparation/Planning Training) increased pain   Treatment Detail/Skilled  Intervention Cues for adjusting positioning to reduce pain with transfer - progresses to SBA sit <> stand from EOB and toilet using grab bar. Educ on options for toilet hygiene within precautions - assisted with searching online for toilet tongs. Educ on positioning for LB dressing - progresses to setup to thread BLE into pants with increased effort, endorses fatigue. Cues for log roll tech, progresses to CGA sit > supine using bedrail.   OT Discharge Planning   OT Plan 1 more session to review toilet AE (bring to show), LB dressing, log roll   OT Discharge Recommendation (DC Rec) home with assist   OT Rationale for DC Rec lives with supportive spouse who is available 24/7 and has been assisting since surgery.   OT Brief overview of current status SBA/PRN Ax1   OT Total Distance Amb During Session (feet) 15   Total Session Time   Timed Code Treatment Minutes 14   Total Session Time (sum of timed and untimed services) 24

## 2025-06-03 NOTE — PLAN OF CARE
Problem: Infection  Goal: Absence of Infection Signs and Symptoms  Outcome: Progressing     Problem: Skin Injury Risk Increased  Goal: Skin Health and Integrity  Outcome: Progressing  Intervention: Plan: Nurse Driven Intervention: Moisture Management  Recent Flowsheet Documentation  Taken 6/2/2025 2000 by Aldo Erazo RN  Moisture Interventions:   Encourage regular toileting   No brief in bed  Bathing/Skin Care: linen changed     Problem: Comorbidity Management  Goal: Blood Pressure in Desired Range  Outcome: Progressing     Problem: Adult Inpatient Plan of Care  Goal: Optimal Comfort and Wellbeing  Outcome: Progressing   Goal Outcome Evaluation:       A/Ox4, pleasant, calls appropriately, O2 Sats  stable between 90 to 93% on RA. PRN dilaudid administered at bedtime for abdominal pain. Pt walks to the bathroom with standby assist x 1 , a little unstable. Lap sites stiches are open to air,  intact no drainage noted. Dressing intact to wound site, where the drain was removed. Tele Sinus Tachy.

## 2025-06-03 NOTE — PROGRESS NOTES
General Surgery Progress Note:    Hospital Day # 2    ASSESSMENT:   1. Intra-abdominal fluid collection    2. Right lower quadrant abdominal pain    3.  Cellulitis of the abdominal wall.   4.      Gia Sultana is a 59 year old female s/p robotic sigmoidectomy.  The patient has stable vitals with slight tachycardia, but she remains clinically stable.  The main concern is an uptrending WBC despite being cipro/ flagyl.  The CT was re-evaluated, does not appear to be any signs of dehiscence.  There is some intra-abdominal fluid collections which is common after postoperative colon surgery.  In addition, the drain that was removed yesterday revealed serous drainage.    The patient continues to have localized pain over the right lower quadrant where the drainage site was identified.  She has some mild erythema over the site.  It's likely that this cellulitic changes causing her findings.  I do not appreciate any fluctuance or any drainage from the old drain site at this time.    Patient does have some urinary symptoms in which she feels like she continues to have a sensation of urinary retention.  Although she does have the symptoms, the patient's creatinine is normal.  I suspect that this sensation is likely secondary to the inflammation from the surgery which is in close proximity to the bladder.    PLAN:   -Obtaining a urinary analysis as well as stool samples to rule out C. difficile.  -Continue to trend WBCs monitor vital signs.  -Placing on vancomycin for cellulitis   -continue to monitor right lower quadrant abdominal region and for possible worsening of cellulitis.  -Continue medical management per primary team.  - No planned surgeries in the short-term.  As result she can continue to have diet as tolerated    SUBJECTIVE:   Gia Sultana was seen on rounds.  Patient states that the deep right lower quadrant abdominal pain where the drain was located has now improved.  Now she has more localized pain to the  skin and subcutaneous level in the right lower quadrant and sometimes this is radiating to the flank and to the back but at the skin level.  She has positive that is improved.  Patient is tolerating diet but states that when she eats something she automatically has to go to the bathroom.  She is passing stool like janice with some yellow liquid.  Patient also states that she has a sensation of urinary bladder retention but still voids with no issues.     Patient Vitals for the past 24 hrs:   BP Temp Temp src Pulse Resp SpO2 Weight   06/03/25 0556 -- -- -- -- -- -- 96.4 kg (212 lb 8 oz)   06/03/25 0431 115/57 98.8  F (37.1  C) Oral -- 20 92 % --   06/02/25 2351 -- -- -- -- 20 (!) 91 % --   06/02/25 1857 -- -- -- -- -- -- 94.9 kg (209 lb 3.5 oz)   06/02/25 1751 133/68 98.1  F (36.7  C) Oral 109 17 93 % --   06/02/25 1552 124/60 99.4  F (37.4  C) Oral 101 19 92 % --   06/02/25 1202 126/60 98.9  F (37.2  C) Oral 94 16 92 % --       Physical Exam:  General: NAD, pleasant  CV:RRR  LUNGS:Normal respiratory effort, no accessory muscle use  ABD: Abdomen soft, nondistended.  Morbidly obese abdomen.  The surgical drain site has a halo of erythema around the site.  There is no drainage.  I do not appreciate any fluctuance but I do appreciate some mild induration over that area.  EXT:no CCE      Flaco Mendoza DO  General Surgeon  [facility]  Surgery [facility] - [address]  [address]  [place] [address]?  Office: [phone]  Employed by - [facility]  Pager: [phone]

## 2025-06-04 ENCOUNTER — APPOINTMENT (OUTPATIENT)
Dept: OCCUPATIONAL THERAPY | Facility: HOSPITAL | Age: 60
End: 2025-06-04
Payer: COMMERCIAL

## 2025-06-04 ENCOUNTER — APPOINTMENT (OUTPATIENT)
Dept: CT IMAGING | Facility: HOSPITAL | Age: 60
End: 2025-06-04
Attending: STUDENT IN AN ORGANIZED HEALTH CARE EDUCATION/TRAINING PROGRAM
Payer: COMMERCIAL

## 2025-06-04 LAB
ANION GAP SERPL CALCULATED.3IONS-SCNC: 6 MMOL/L (ref 7–15)
BACTERIA UR CULT: NORMAL
BASOPHILS # BLD AUTO: 0.1 10E3/UL (ref 0–0.2)
BASOPHILS NFR BLD AUTO: 0 %
BUN SERPL-MCNC: 6.1 MG/DL (ref 8–23)
CALCIUM SERPL-MCNC: 8.7 MG/DL (ref 8.8–10.4)
CHLORIDE SERPL-SCNC: 98 MMOL/L (ref 98–107)
CREAT SERPL-MCNC: 0.65 MG/DL (ref 0.51–0.95)
EGFRCR SERPLBLD CKD-EPI 2021: >90 ML/MIN/1.73M2
EOSINOPHIL # BLD AUTO: 0.3 10E3/UL (ref 0–0.7)
EOSINOPHIL NFR BLD AUTO: 1 %
ERYTHROCYTE [DISTWIDTH] IN BLOOD BY AUTOMATED COUNT: 14.2 % (ref 10–15)
GLUCOSE SERPL-MCNC: 128 MG/DL (ref 70–99)
HCO3 SERPL-SCNC: 31 MMOL/L (ref 22–29)
HCT VFR BLD AUTO: 34.8 % (ref 35–47)
HGB BLD-MCNC: 11.1 G/DL (ref 11.7–15.7)
IMM GRANULOCYTES # BLD: 0.2 10E3/UL
IMM GRANULOCYTES NFR BLD: 1 %
LYMPHOCYTES # BLD AUTO: 1.9 10E3/UL (ref 0.8–5.3)
LYMPHOCYTES NFR BLD AUTO: 9 %
MAGNESIUM SERPL-MCNC: 1.8 MG/DL (ref 1.7–2.3)
MCH RBC QN AUTO: 27.3 PG (ref 26.5–33)
MCHC RBC AUTO-ENTMCNC: 31.9 G/DL (ref 31.5–36.5)
MCV RBC AUTO: 86 FL (ref 78–100)
MONOCYTES # BLD AUTO: 1.1 10E3/UL (ref 0–1.3)
MONOCYTES NFR BLD AUTO: 5 %
MRSA DNA SPEC QL NAA+PROBE: NEGATIVE
NEUTROPHILS # BLD AUTO: 18.1 10E3/UL (ref 1.6–8.3)
NEUTROPHILS NFR BLD AUTO: 84 %
NRBC # BLD AUTO: 0 10E3/UL
NRBC BLD AUTO-RTO: 0 /100
PHOSPHATE SERPL-MCNC: 2.6 MG/DL (ref 2.5–4.5)
PLATELET # BLD AUTO: 484 10E3/UL (ref 150–450)
POTASSIUM SERPL-SCNC: 3.3 MMOL/L (ref 3.4–5.3)
RBC # BLD AUTO: 4.07 10E6/UL (ref 3.8–5.2)
SA TARGET DNA: NEGATIVE
SODIUM SERPL-SCNC: 135 MMOL/L (ref 135–145)
WBC # BLD AUTO: 21.5 10E3/UL (ref 4–11)

## 2025-06-04 PROCEDURE — 250N000013 HC RX MED GY IP 250 OP 250 PS 637: Performed by: HOSPITALIST

## 2025-06-04 PROCEDURE — 99233 SBSQ HOSP IP/OBS HIGH 50: CPT | Performed by: HOSPITALIST

## 2025-06-04 PROCEDURE — 250N000011 HC RX IP 250 OP 636: Mod: JZ

## 2025-06-04 PROCEDURE — 87070 CULTURE OTHR SPECIMN AEROBIC: CPT | Performed by: STUDENT IN AN ORGANIZED HEALTH CARE EDUCATION/TRAINING PROGRAM

## 2025-06-04 PROCEDURE — 36415 COLL VENOUS BLD VENIPUNCTURE: CPT | Performed by: INTERNAL MEDICINE

## 2025-06-04 PROCEDURE — 250N000011 HC RX IP 250 OP 636: Mod: JZ | Performed by: INTERNAL MEDICINE

## 2025-06-04 PROCEDURE — 250N000011 HC RX IP 250 OP 636: Performed by: STUDENT IN AN ORGANIZED HEALTH CARE EDUCATION/TRAINING PROGRAM

## 2025-06-04 PROCEDURE — 84100 ASSAY OF PHOSPHORUS: CPT | Performed by: HOSPITALIST

## 2025-06-04 PROCEDURE — 83735 ASSAY OF MAGNESIUM: CPT | Performed by: INTERNAL MEDICINE

## 2025-06-04 PROCEDURE — 99222 1ST HOSP IP/OBS MODERATE 55: CPT | Performed by: STUDENT IN AN ORGANIZED HEALTH CARE EDUCATION/TRAINING PROGRAM

## 2025-06-04 PROCEDURE — 120N000001 HC R&B MED SURG/OB

## 2025-06-04 PROCEDURE — 82435 ASSAY OF BLOOD CHLORIDE: CPT | Performed by: INTERNAL MEDICINE

## 2025-06-04 PROCEDURE — 258N000003 HC RX IP 258 OP 636: Performed by: STUDENT IN AN ORGANIZED HEALTH CARE EDUCATION/TRAINING PROGRAM

## 2025-06-04 PROCEDURE — 85018 HEMOGLOBIN: CPT

## 2025-06-04 PROCEDURE — 250N000013 HC RX MED GY IP 250 OP 250 PS 637: Performed by: INTERNAL MEDICINE

## 2025-06-04 PROCEDURE — 85004 AUTOMATED DIFF WBC COUNT: CPT

## 2025-06-04 PROCEDURE — 97535 SELF CARE MNGMENT TRAINING: CPT | Mod: GO

## 2025-06-04 PROCEDURE — 80048 BASIC METABOLIC PNL TOTAL CA: CPT | Performed by: INTERNAL MEDICINE

## 2025-06-04 PROCEDURE — 74177 CT ABD & PELVIS W/CONTRAST: CPT

## 2025-06-04 RX ORDER — MAGNESIUM OXIDE 400 MG/1
400 TABLET ORAL EVERY 4 HOURS
Status: COMPLETED | OUTPATIENT
Start: 2025-06-04 | End: 2025-06-04

## 2025-06-04 RX ORDER — IOPAMIDOL 755 MG/ML
90 INJECTION, SOLUTION INTRAVASCULAR ONCE
Status: COMPLETED | OUTPATIENT
Start: 2025-06-04 | End: 2025-06-04

## 2025-06-04 RX ORDER — KETOROLAC TROMETHAMINE 30 MG/ML
30 INJECTION, SOLUTION INTRAMUSCULAR; INTRAVENOUS EVERY 6 HOURS
Status: DISPENSED | OUTPATIENT
Start: 2025-06-04 | End: 2025-06-06

## 2025-06-04 RX ORDER — POTASSIUM CHLORIDE 1500 MG/1
40 TABLET, EXTENDED RELEASE ORAL
Status: COMPLETED | OUTPATIENT
Start: 2025-06-04 | End: 2025-06-04

## 2025-06-04 RX ORDER — CEFAZOLIN SODIUM 1 G/50ML
1250 SOLUTION INTRAVENOUS EVERY 12 HOURS
Status: DISPENSED | OUTPATIENT
Start: 2025-06-04

## 2025-06-04 RX ORDER — VANCOMYCIN HYDROCHLORIDE 125 MG/1
125 CAPSULE ORAL 4 TIMES DAILY
Status: DISCONTINUED | OUTPATIENT
Start: 2025-06-04 | End: 2025-06-04

## 2025-06-04 RX ORDER — KETOROLAC TROMETHAMINE 30 MG/ML
30 INJECTION, SOLUTION INTRAMUSCULAR; INTRAVENOUS EVERY 6 HOURS PRN
Status: DISCONTINUED | OUTPATIENT
Start: 2025-06-04 | End: 2025-06-04

## 2025-06-04 RX ADMIN — CIPROFLOXACIN 400 MG: 400 INJECTION, SOLUTION INTRAVENOUS at 08:25

## 2025-06-04 RX ADMIN — METRONIDAZOLE 500 MG: 500 INJECTION, SOLUTION INTRAVENOUS at 08:42

## 2025-06-04 RX ADMIN — ACETAMINOPHEN 650 MG: 325 TABLET ORAL at 00:19

## 2025-06-04 RX ADMIN — HYDROMORPHONE HYDROCHLORIDE 1 MG: 2 TABLET ORAL at 21:26

## 2025-06-04 RX ADMIN — KETOROLAC TROMETHAMINE 30 MG: 30 INJECTION, SOLUTION INTRAMUSCULAR at 14:25

## 2025-06-04 RX ADMIN — PANTOPRAZOLE SODIUM 40 MG: 40 TABLET, DELAYED RELEASE ORAL at 20:12

## 2025-06-04 RX ADMIN — ACETAMINOPHEN 650 MG: 325 TABLET ORAL at 11:48

## 2025-06-04 RX ADMIN — IOPAMIDOL 90 ML: 755 INJECTION, SOLUTION INTRAVENOUS at 19:44

## 2025-06-04 RX ADMIN — SODIUM CHLORIDE 1250 MG: 0.9 INJECTION, SOLUTION INTRAVENOUS at 11:57

## 2025-06-04 RX ADMIN — KETOROLAC TROMETHAMINE 30 MG: 30 INJECTION, SOLUTION INTRAMUSCULAR at 20:13

## 2025-06-04 RX ADMIN — MAGNESIUM OXIDE TAB 400 MG (241.3 MG ELEMENTAL MG) 400 MG: 400 (241.3 MG) TAB at 11:48

## 2025-06-04 RX ADMIN — POTASSIUM CHLORIDE 40 MEQ: 1500 TABLET, EXTENDED RELEASE ORAL at 11:48

## 2025-06-04 RX ADMIN — ACETAMINOPHEN 650 MG: 325 TABLET ORAL at 05:05

## 2025-06-04 RX ADMIN — HYDROMORPHONE HYDROCHLORIDE 1 MG: 2 TABLET ORAL at 11:48

## 2025-06-04 RX ADMIN — ONDANSETRON 4 MG: 2 INJECTION, SOLUTION INTRAMUSCULAR; INTRAVENOUS at 08:39

## 2025-06-04 RX ADMIN — POTASSIUM CHLORIDE 40 MEQ: 1500 TABLET, EXTENDED RELEASE ORAL at 09:13

## 2025-06-04 RX ADMIN — CETIRIZINE HYDROCHLORIDE 10 MG: 10 TABLET, FILM COATED ORAL at 08:26

## 2025-06-04 RX ADMIN — HYDROMORPHONE HYDROCHLORIDE 1 MG: 2 TABLET ORAL at 00:19

## 2025-06-04 RX ADMIN — HYDROMORPHONE HYDROCHLORIDE 1 MG: 2 TABLET ORAL at 16:51

## 2025-06-04 RX ADMIN — CIPROFLOXACIN 400 MG: 400 INJECTION, SOLUTION INTRAVENOUS at 20:20

## 2025-06-04 RX ADMIN — HYDROMORPHONE HYDROCHLORIDE 2 MG: 2 TABLET ORAL at 06:39

## 2025-06-04 RX ADMIN — KETOROLAC TROMETHAMINE 30 MG: 30 INJECTION, SOLUTION INTRAMUSCULAR at 09:04

## 2025-06-04 RX ADMIN — ACETAMINOPHEN 650 MG: 325 TABLET ORAL at 21:26

## 2025-06-04 RX ADMIN — VANCOMYCIN HYDROCHLORIDE 125 MG: 125 CAPSULE ORAL at 08:30

## 2025-06-04 RX ADMIN — MAGNESIUM OXIDE TAB 400 MG (241.3 MG ELEMENTAL MG) 400 MG: 400 (241.3 MG) TAB at 08:26

## 2025-06-04 RX ADMIN — VENLAFAXINE HYDROCHLORIDE 75 MG: 75 CAPSULE, EXTENDED RELEASE ORAL at 20:13

## 2025-06-04 RX ADMIN — MINOXIDIL 1.25 MG: 2.5 TABLET ORAL at 20:13

## 2025-06-04 RX ADMIN — ACETAMINOPHEN 650 MG: 325 TABLET ORAL at 16:51

## 2025-06-04 RX ADMIN — SODIUM CHLORIDE 1250 MG: 0.9 INJECTION, SOLUTION INTRAVENOUS at 23:50

## 2025-06-04 RX ADMIN — METRONIDAZOLE 500 MG: 500 INJECTION, SOLUTION INTRAVENOUS at 21:27

## 2025-06-04 ASSESSMENT — ACTIVITIES OF DAILY LIVING (ADL)
ADLS_ACUITY_SCORE: 42
ADLS_ACUITY_SCORE: 38
ADLS_ACUITY_SCORE: 42
ADLS_ACUITY_SCORE: 38
ADLS_ACUITY_SCORE: 42
ADLS_ACUITY_SCORE: 38
ADLS_ACUITY_SCORE: 42
ADLS_ACUITY_SCORE: 38
ADLS_ACUITY_SCORE: 38
ADLS_ACUITY_SCORE: 42
ADLS_ACUITY_SCORE: 38
ADLS_ACUITY_SCORE: 42
ADLS_ACUITY_SCORE: 42
ADLS_ACUITY_SCORE: 38
ADLS_ACUITY_SCORE: 38
ADLS_ACUITY_SCORE: 42
ADLS_ACUITY_SCORE: 38
ADLS_ACUITY_SCORE: 38
ADLS_ACUITY_SCORE: 41
ADLS_ACUITY_SCORE: 38
ADLS_ACUITY_SCORE: 41

## 2025-06-04 NOTE — PLAN OF CARE
Problem: Skin Injury Risk Increased  Goal: Skin Health and Integrity  Outcome: Progressing       Problem: Infection  Goal: Absence of Infection Signs and Symptoms  Outcome: Progressing     Problem: Comorbidity Management  Goal: Blood Pressure in Desired Range  Outcome: Progressing           A/Ox4, calls appropriately, pleasant, VS on RA, O2 Sats WNL. PRN dilaudid administered at bedtime for abdominal pain. Pt walks to the bathroom with standby assist x 1 , a little unstable. Lap sites clean dry and intact.  Dressing intact to wound site, where the drain was removed. Tele Rhythm, can be Tarchy.

## 2025-06-04 NOTE — PROGRESS NOTES
"General Surgery Progress Note:    Hospital Day # 3    ASSESSMENT:  1. Intra-abdominal fluid collection    2. Right lower quadrant abdominal pain        Gia CONCHITA Sultana is a 59 year old female who is s/p robotic sigmoidectomy on 05/22 who is currently admitted for postoperative pain localized to RLQ previous drain site. CT scan obtained on 06/01 revealing postoperative seroma with RLQ subcutaneous fat stranding without intra-abdominal fluid collections. Stool studies revealing + c-diff, UA + leukocytes. Currently on Cipro, flagyl and now PO vancomycin.     AM labs notable notable for persistent leukocytosis with slight downtrend (WBC 21.5<25.5). Vitals with HR 90s-100s, normotension without fever. She is reporting persistent RLQ TTP. Abdomen with mild RLQ TTP without rebound or guarding. Improving cellulitis. Incisions are CDI. Will continue abx, pain control and CBC trend.     PLAN:  -LFD as tolerated  -Multimodal pain control, Toradol added  -Abx per [facility]  -Encourage movement/activity  -CBC trend  -Surgery to follow    SUBJECTIVE:   She is reporting doing okay but with ongoing RLQ abdominal pain that is improving with PO dilaudid and tylenol but intermittent stabbing pain to RLQ. Denies fever, chills, n/v. Had 2 loose \"explosive BM\" yesterday.     Patient Vitals for the past 24 hrs:   BP Temp Temp src Pulse Resp SpO2   06/04/25 0800 110/55 98.3  F (36.8  C) Oral 99 18 93 %   06/04/25 0400 122/59 98.6  F (37  C) Oral -- 18 92 %   06/03/25 2347 120/58 99.2  F (37.3  C) Oral 104 20 92 %   06/03/25 1538 123/58 99.3  F (37.4  C) Oral 100 18 (!) 91 %         PHYSICAL EXAM:  General: patient seen resting in bed, no acute distress  Resp: no respiratory distress, breathing comfortably on room air.   Abdomen: obese soft, non distended with mild RLQ TTP without rebound or guarding adjacent to drain site. Drain site with focal mucopurulent fluid that is not actively drainage, superficial to the skin without surrounding " fluctuance. Improving RLQ cellulitis. Incsions are CDI with overlying staples.     Output by Drain (mL) 06/02/25 0700 - 06/02/25 1459 06/02/25 1500 - 06/02/25 2259 06/02/25 2300 - 06/03/25 0659 06/03/25 0700 - 06/03/25 1459 06/03/25 1500 - 06/03/25 2259 06/03/25 2300 - 06/04/25 0659 06/04/25 0700 - 06/04/25 1039   Patient has no LDAs of requested type attached.      Extremities: warm and well perfused    06/03 0700 - 06/04 0659  In: 1222 [P.O.:240; I.V.:982]  Out: 300 [Urine:100]    Admission on 06/01/2025   Component Date Value    Sodium 06/01/2025 137     Potassium 06/01/2025 4.0     Chloride 06/01/2025 97 (L)     Carbon Dioxide (CO2) 06/01/2025 27     Anion Gap 06/01/2025 13     Urea Nitrogen 06/01/2025 12.6     Creatinine 06/01/2025 0.63     GFR Estimate 06/01/2025 >90     Calcium 06/01/2025 9.6     Glucose 06/01/2025 103 (H)     Magnesium 06/01/2025 1.5 (L)     Lipase 06/01/2025 23     Protein Total 06/01/2025 6.8     Albumin 06/01/2025 3.6     Bilirubin Total 06/01/2025 0.4     Alkaline Phosphatase 06/01/2025 106     AST 06/01/2025 27     ALT 06/01/2025 26     Bilirubin Direct 06/01/2025 <0.08     Lactic Acid 06/01/2025 1.6     WBC Count 06/01/2025 16.5 (H)     RBC Count 06/01/2025 5.19     Hemoglobin 06/01/2025 14.3     Hematocrit 06/01/2025 43.0     MCV 06/01/2025 83     MCH 06/01/2025 27.6     MCHC 06/01/2025 33.3     RDW 06/01/2025 14.0     Platelet Count 06/01/2025 511 (H)     % Neutrophils 06/01/2025 75     % Lymphocytes 06/01/2025 17     % Monocytes 06/01/2025 5     % Eosinophils 06/01/2025 2     % Basophils 06/01/2025 1     % Immature Granulocytes 06/01/2025 1     NRBCs per 100 WBC 06/01/2025 0     Absolute Neutrophils 06/01/2025 12.3 (H)     Absolute Lymphocytes 06/01/2025 2.8     Absolute Monocytes 06/01/2025 0.9     Absolute Eosinophils 06/01/2025 0.3     Absolute Basophils 06/01/2025 0.1     Absolute Immature Granul* 06/01/2025 0.1     Absolute NRBCs 06/01/2025 0.0     Magnesium 06/02/2025 2.1      Magnesium 06/01/2025 2.2     Sodium 06/02/2025 137     Potassium 06/02/2025 3.9     Carbon Dioxide (CO2) 06/02/2025 30 (H)     Anion Gap 06/02/2025 9     Urea Nitrogen 06/02/2025 9.2     Creatinine 06/02/2025 0.73     GFR Estimate 06/02/2025 >90     Calcium 06/02/2025 8.8     Chloride 06/02/2025 98     Glucose 06/02/2025 110 (H)     Alkaline Phosphatase 06/02/2025 106     AST 06/02/2025 19     ALT 06/02/2025 25     Protein Total 06/02/2025 6.0 (L)     Albumin 06/02/2025 3.4 (L)     Bilirubin Total 06/02/2025 0.4     CRP Inflammation 06/02/2025 158.20 (H)     WBC Count 06/02/2025 16.5 (H)     RBC Count 06/02/2025 4.72     Hemoglobin 06/02/2025 13.3     Hematocrit 06/02/2025 40.0     MCV 06/02/2025 85     MCH 06/02/2025 28.2     MCHC 06/02/2025 33.3     RDW 06/02/2025 14.2     Platelet Count 06/02/2025 482 (H)     % Neutrophils 06/02/2025 78     % Lymphocytes 06/02/2025 14     % Monocytes 06/02/2025 6     % Eosinophils 06/02/2025 1     % Basophils 06/02/2025 0     % Immature Granulocytes 06/02/2025 1     NRBCs per 100 WBC 06/02/2025 0     Absolute Neutrophils 06/02/2025 12.9 (H)     Absolute Lymphocytes 06/02/2025 2.3     Absolute Monocytes 06/02/2025 1.0     Absolute Eosinophils 06/02/2025 0.1     Absolute Basophils 06/02/2025 0.1     Absolute Immature Granul* 06/02/2025 0.1     Absolute NRBCs 06/02/2025 0.0     Magnesium 06/03/2025 1.9     Sodium 06/03/2025 136     Potassium 06/03/2025 3.8     Chloride 06/03/2025 99     Carbon Dioxide (CO2) 06/03/2025 27     Anion Gap 06/03/2025 10     Urea Nitrogen 06/03/2025 6.6 (L)     Creatinine 06/03/2025 0.67     GFR Estimate 06/03/2025 >90     Calcium 06/03/2025 9.2     Glucose 06/03/2025 128 (H)     WBC Count 06/03/2025 25.5 (H)     RBC Count 06/03/2025 4.60     Hemoglobin 06/03/2025 13.0     Hematocrit 06/03/2025 39.2     MCV 06/03/2025 85     MCH 06/03/2025 28.3     MCHC 06/03/2025 33.2     RDW 06/03/2025 14.2     Platelet Count 06/03/2025 482 (H)     C Difficile Toxin B  by P* 06/03/2025 Positive (A)     Reflex EIA comment 06/03/2025      Color Urine 06/03/2025 Yellow     Appearance Urine 06/03/2025 Turbid (A)     Glucose Urine 06/03/2025 30 (A)     Bilirubin Urine 06/03/2025 Negative     Ketones Urine 06/03/2025 10 (A)     Specific Gravity Urine 06/03/2025 1.023     Blood Urine 06/03/2025 0.06 mg/dL (A)     pH Urine 06/03/2025 5.5     Protein Albumin Urine 06/03/2025 20 (A)     Urobilinogen Urine 06/03/2025 Normal     Nitrite Urine 06/03/2025 Negative     Leukocyte Esterase Urine 06/03/2025 500 Alexa/uL (A)     Bacteria Urine 06/03/2025 Moderate (A)     Mucus Urine 06/03/2025 Present (A)     RBC Urine 06/03/2025 14 (H)     WBC Urine 06/03/2025 70 (H)     Squamous Epithelials Uri* 06/03/2025 10 (H)     Transitional Epithelials* 06/03/2025 <1     Culture 06/03/2025 <10,000 CFU/mL Mixture of Urogenital Yeimi     MRSA Target DNA 06/03/2025 Negative     SA Target DNA 06/03/2025 Negative     C. difficile GDH Antigen 06/03/2025 Positive (A)     C. difficile Toxin 06/03/2025 Negative     Magnesium 06/04/2025 1.8     Sodium 06/04/2025 135     Potassium 06/04/2025 3.3 (L)     Chloride 06/04/2025 98     Carbon Dioxide (CO2) 06/04/2025 31 (H)     Anion Gap 06/04/2025 6 (L)     Urea Nitrogen 06/04/2025 6.1 (L)     Creatinine 06/04/2025 0.65     GFR Estimate 06/04/2025 >90     Calcium 06/04/2025 8.7 (L)     Glucose 06/04/2025 128 (H)     WBC Count 06/04/2025 21.5 (H)     RBC Count 06/04/2025 4.07     Hemoglobin 06/04/2025 11.1 (L)     Hematocrit 06/04/2025 34.8 (L)     MCV 06/04/2025 86     MCH 06/04/2025 27.3     MCHC 06/04/2025 31.9     RDW 06/04/2025 14.2     Platelet Count 06/04/2025 484 (H)     % Neutrophils 06/04/2025 84     % Lymphocytes 06/04/2025 9     % Monocytes 06/04/2025 5     % Eosinophils 06/04/2025 1     % Basophils 06/04/2025 0     % Immature Granulocytes 06/04/2025 1     NRBCs per 100 WBC 06/04/2025 0     Absolute Neutrophils 06/04/2025 18.1 (H)     Absolute Lymphocytes  06/04/2025 1.9     Absolute Monocytes 06/04/2025 1.1     Absolute Eosinophils 06/04/2025 0.3     Absolute Basophils 06/04/2025 0.1     Absolute Immature Granul* 06/04/2025 0.2     Absolute NRBCs 06/04/2025 0.0     Phosphorus 06/04/2025 2.6      Kim Torres PA-C  [facility]  Surgery [address] [address]?  Office: [phone]

## 2025-06-04 NOTE — PLAN OF CARE
Problem: Pain Acute  Goal: Optimal Pain Control and Function  Outcome: Progressing     Problem: Infection  Goal: Absence of Infection Signs and Symptoms  Outcome: Progressing   Goal Outcome Evaluation:       Pt has ongoing pain 3-5/10 in abdomen.  Pt is receiving apap, po dilaudid, and newly added ketorolac for pain with good relief.     Writer sent swab from old tico site to lab per ID.  PT took shower this afternoon and is up with standby assist.    Pt was cleared by therapy to walk with  in hallway.     MD approved pt to go outside for 15 min if she checks with RN first.

## 2025-06-04 NOTE — PLAN OF CARE
Problem: Adult Inpatient Plan of Care  Goal: Plan of Care Review  Description: The Plan of Care Review/Shift note should be completed every shift.  The Outcome Evaluation is a brief statement about your assessment that the patient is improving, declining, or no change.  This information will be displayed automatically on your shift  note.  Outcome: Progressing   Goal Outcome Evaluation:       VSS  Transfers to bathroom with SBA steady and Ind on feet    Pain pt states is in her right lower abd where the old BRITT drain site was. The area is indurated with surrounding erythema which was demarcated this afternoon by this writer. The old insertion site had no drainage noted and wound bed was 100% slough. Cleansed area with NS and applied dry guaze.    Lap sites and pubis incision with staples and intact no drainage and slight redness noted ziyad incisional area.    Pt stated throughout the day that her pain was 5-6/10 with relief of 2-3/10 after getting Dilaudid 1 mg PO with 650 mg of Tylenol x 3 this shift.    PIV left arm is P/I.    Pt c/o nausea this am and was given IVP Zofran. With stated relief.    UA sent, culture is pending at this time.  C-diff spec was sent and result was called to Dr. Nelson for review, per MD orders pt was started on PO Vanco to start this evening as per orders.    MRSA nares screen not complete yet, will alert oncoming RN to get sample this evening.    Mag+-1.9- pt given 400 mg PO mag oxide as per protocol orders. Will recheck level in the am.    Tele sinus tachy this am, was removed and then reordered per MD. Tele was restarted as per orders.    Pt has been pleasant and cooperative with all cares and treatments this shift.    Enteric precautions maintained.    POC is ongoing.

## 2025-06-04 NOTE — PLAN OF CARE
Problem: Pain Acute  Goal: Optimal Pain Control and Function  Outcome: Progressing     Problem: Infection  Goal: Absence of Infection Signs and Symptoms  Outcome: Progressing   Goal Outcome Evaluation:       Pt has ongoing pain 3-5/10 in abdomen.  Pt is receiving apap, po dilaudid, and newly added ketorolac for pain with good relief.     Writer sent swab from old tico site to lab per ID. Pt will have CT today of abdomen  PT took shower this afternoon and is up with standby assist.    Pt was cleared by therapy to walk with  in hallway.     MD approved pt to go outside for 15 min if she checks with RN first.

## 2025-06-04 NOTE — PHARMACY-VANCOMYCIN DOSING SERVICE
Pharmacy Vancomycin Initial Note  Date of Service 2025  Patient's  1965  59 year old, female    Indication: Skin and Soft Tissue Infection    Current estimated CrCl = Estimated Creatinine Clearance: 99.7 mL/min (based on SCr of 0.65 mg/dL).    Creatinine for last 3 days  2025:  5:15 PM Creatinine 0.63 mg/dL  2025:  4:38 AM Creatinine 0.73 mg/dL  6/3/2025:  6:51 AM Creatinine 0.67 mg/dL  2025:  6:11 AM Creatinine 0.65 mg/dL    Recent Vancomycin Level(s) for last 3 days  No results found for requested labs within last 3 days.      Vancomycin IV Administrations (past 72 hours)                     vancomycin (VANCOCIN) 1,250 mg in sodium chloride 0.9 % 250 mL intermittent infusion (mg) 1,250 mg New Bag 25 2221    vancomycin (VANCOCIN) 2,000 mg in sodium chloride 0.9 % 500 mL intermittent infusion (mg) 2,000 mg New Bag 25 1300                    Nephrotoxins and other renal medications (From now, onward)      Start     Dose/Rate Route Frequency Ordered Stop    25 1500  ketorolac (TORADOL) injection 30 mg         30 mg Intravenous EVERY 6 HOURS 25 1038 25 1459    25 1200  vancomycin (VANCOCIN) 1,250 mg in sodium chloride 0.9 % 250 mL intermittent infusion         1,250 mg  over 90 Minutes Intravenous EVERY 12 HOURS 25 1108              Contrast Orders - past 72 hours (72h ago, onward)      Start     Dose/Rate Route Frequency Stop    25 1830  iopamidol (ISOVUE-370) solution 90 mL         90 mL Intravenous ONCE 25 1801            [employer] Prediction of Planned Initial Vancomycin Regimen  Regimen: 1250 mg IV every 12 hours.  Start time: 11:32 on 2025  Exposure target: AUC24 (range) 400-600 mg/L.hr   AUC24,ss: 491 mg/L.hr  Probability of AUC24 > 400: 70 %  Ctrough,ss: 15.2 mg/L  Probability of Ctrough,ss > 20: 29 %  Probability of nephrotoxicity (Lodise DEJUAN ): 10 %        Plan:  Start vancomycin  1250 mg IV q12h.   Vancomycin  monitoring method: AUC  Vancomycin therapeutic monitoring goal: 400-600 mg*h/L  Pharmacy will check vancomycin levels as appropriate in 1-3 Days.    Serum creatinine levels will be ordered daily for the first week of therapy and at least twice weekly for subsequent weeks.      Tabby Cerda RP

## 2025-06-04 NOTE — PROGRESS NOTES
[facility]    Medicine Progress Note - Hospitalist Service    Date of Admission:  6/1/2025    Assessment & Plan   Gia Sultana is a 59-year-old female with past medical history of obesity, mood disorder and history of diverticulitis with perforation that required IR placement of drainage tube and recent hospitalization for planned sigmoid colectomy with splenic flexure takedown on 5/22/2025 who presented to the emergency room for worsening abdominal pain.  Found to have postoperative fluid collection with seroma.      Abdominal pain with postoperative seroma  Cellulitis around drainage site of abdominal wall  Leukocytosis   - S/P uncomplicated Robot assisted laparoscopic sigmoid colectomy with splenic flexure takedown on 5/22/2025  - CT abdomen shows 5 cm focal fluid collection without enhancing rim seen in the lower ventral abdominal wall with overlying subcutaneous fat stranding, likely postoperative seroma  - Normal lactic acid but elevated white blood cells, CRP elevated 158.2  - Surgical consult, appreciate input: Removed drain 6/2  - Continue IV antibiotic empirically with Flagyl and Cipro, vancomycin started 6/3  - Low fiber diet  - Received IV fluids 6/1 to 6/2, now discontinued  - Oral Dilaudid as needed  - UA contaminated with 10 squamous cells  - Ucx pending  - C. Dif showing carriage, toxigenic disease negative  - MRSA swab ordered  - ID consulted, recommending CT scan 6/4     Depression/anxiety  - Continue PTA venlafaxine     Hypomagnesemia  -Replace per protocol     Obesity class II  - PTA Wegovy on hold while inpatient      Hair loss  -Continue PTA minoxidil          Diet: Low Fiber Diet    DVT Prophylaxis: Pneumatic Compression Devices  Curiel Catheter: Not present  Lines: None     Cardiac Monitoring: ACTIVE order. Indication: Tachyarrhythmias, acute (48 hours)  Code Status: Full Code      Clinically Significant Risk Factors        # Hypokalemia: Lowest K = 3.3 mmol/L in last  "2 days, will replace as needed        # Hypoalbuminemia: Lowest albumin = 3.4 g/dL at 6/2/2025  4:38 AM, will monitor as appropriate     # Hypertension: Noted on problem list            # Obesity: Estimated body mass index is 38.87 kg/m  as calculated from the following:    Height as of this encounter: 1.575 m (5' 2\").    Weight as of this encounter: 96.4 kg (212 lb 8 oz)., PRESENT ON ADMISSION            Social Drivers of Health    Tobacco Use: High Risk (5/22/2025)    Patient History     Smoking Tobacco Use: Every Day     Smokeless Tobacco Use: Never          Disposition Plan     Medically Ready for Discharge: Anticipated in 2-4 Days             Phill Lubin MD  Hospitalist Service  [facility]  Securely message with Written (more info)  Text page via [employer] Paging/Directory   ______________________________________________________________________    Interval History   Ongoing pain and nausea.    Physical Exam   Vital Signs: Temp: 98.1  F (36.7  C) Temp src: Tympanic BP: 112/54 Pulse: 99   Resp: 24 SpO2: (!) 91 % O2 Device: None (Room air)    Weight: 212 lbs 8 oz    Tired appearing, no distress, mild diaphoresis, heart rate regular, lungs clear, abdomen minimally tender right lower quadrant, no leg edema    Medical Decision Making       52 MINUTES SPENT BY ME on the date of service doing chart review, history, exam, documentation & further activities per the note.  NOTE(S)/MEDICAL RECORDS REVIEWED over the past 24 hours: Vitals, labs, new imaging, documentation and medication administrations, discussed with surgery      Data     I have personally reviewed the following data over the past 24 hrs:    21.5 (H)  \   11.1 (L)   / 484 (H)     135 98 6.1 (L) /  128 (H)   3.3 (L) 31 (H) 0.65 \       Imaging results reviewed over the past 24 hrs:   No results found for this or any previous visit (from the past 24 hours).  "

## 2025-06-04 NOTE — CONSULTS
Consultation - INFECTIOUS DISEASE CONSULTATION  Gia Sultana,  1965, MRN [id number]      Intra-abdominal fluid collection [R18.8]  Right lower quadrant abdominal pain [R10.31]    PCP: Mansi Mccracken, [phone]   Code status:  Full Code               Chief Complaint: Worsening leukocytosis     Assessment:  Gia Sultana is a 59 year old old female with   Morbid obesity with BMI of 39.  History of perforated diverticulitis.  Status post IR guided drain placement.  Recent history of sigmoid colectomy with splenic flexure takedown on 2025.  Readmitted on 2025 with postoperative fluid collection associated with leukocytosis.  Started on Cipro and Flagyl.  IV vancomycin added on 6/3/2025.  She is MRSA negative on screening.  Clinically having some purulent drainage from BRITT site on the right flank as well as right lower quadrant tenderness.  Positive C. difficile testing consistent with colonization.  P.o. vancomycin 125 mg started on 2025.  Reported 1 semiformed stool on 6/3/2025.  C. difficile is unlikely.    Recommendations:   Cipro and Flagyl  Monitor WBC and temperature  Discontinue p.o. vancomycin.  Resume IV vancomycin.  Culture purulent drainage from BRITT site.  CT scan of the abdomen and pelvis.    Discussed with the patient, nursing staff.    Thank you for letting us be part of the patient care team. We will follow.    Keyon Cheng MD,MD  [facility].   [facility]  Office Telephone [phone].  Fax [phone]  [facility] paging    HPI:    Gia Sultana is a 59 year old old female. History is provided by patient, chart review.  ID is asked to see this patient for worsening leukocytosis.  The patient has a history of morbid obesity with BMI of 39, perforated diverticulitis status post IR guided drain placement.  She underwent sigmoid colectomy with splenic flexure takedown on 2025.  She was readmitted on 2025 with  abdominal pain and leukocytosis.  She was diagnosed with cellulitis and started on Cipro and Flagyl.  Her WBC continue to worsen and originally 5000 yesterday.  C. difficile testing was done and came back consistent with colonization.  She is starting on p.o. vancomycin and also received 1 dose of IV vancomycin.  Today, she is complaining of right lower quadrant pain as well as some pain at the BRITT site.  Reports 1 semiformed stool yesterday.  None today.      ===========================================    Medical History  Past Medical History:   Diagnosis Date    Anxiety     Arthritis     lower back and knees    Cholelithiasis     Depression     Diverticular disease of large intestine     Dyslipidemia     Female stress incontinence     GERD (gastroesophageal reflux disease)     Hepatic cyst 2018    HTN (hypertension)     Obese     Renal cyst     Snoring         Surgical History  Past Surgical History:   Procedure Laterality Date    COLECTOMY, SIGMOID, ROBOT-ASSISTED, LAPAROSCOPIC, USING DA CHRISSIE XI N/A 2025    Procedure: SPLENIC FLEXURE TAKEDOWN, COLECTOMY, SIGMOID, ROBOT-ASSISTED, LAPAROSCOPIC, USING DA CHRISSIE XI;  Surgeon: Flaco Mendoza DO;  Location: [facility] OR    COLONOSCOPY N/A 2025    Procedure: COLONOSCOPY with polypectomy;  Surgeon: Flaco Mendoza DO;  Location: [facility] OR    COMBINED CYSTOSCOPY, INSERT STENT URETER(S) Bilateral 2025    Procedure: CYSTOSCOPY, WITH BILATERAL URETERAL STENT INSERTION AND INDOCYANINE GREEN INSTILLATION;  Surgeon: Indio Henderson MD;  Location: [facility] OR    ORTHOPEDIC SURGERY      Tirn miniscus surgery    [facility]  DELIVERY ONLY      Description:  Section;  Recorded: 10/16/2008;  Comments: x2    [facility] LAP,CHOLECYSTECTOMY/EXPLORE      Description: Cholecystectomy Laparoscopic;  Recorded: 04/15/2008;    [facility] REVISN TRACHEA,CERVICAL      Tracheoplasty: Trach inserted as a child            Social History  Reviewed, and she   reports that she has been smoking cigarettes. She has a 1.3 pack-year smoking history. She has never used smokeless tobacco. She reports that she does not currently use alcohol. She reports that she does not use drugs.        Family History  Family History   Problem Relation Age of Onset    Diabetes Mother     Obesity Mother     Hypertension Mother     Cerebrovascular Disease Mother     Dementia Father     Diabetes Brother     Obesity Brother     Diabetes Maternal Grandmother     Thyroid Cancer No family hx of     Anesthesia Reaction No family hx of       Psychosocial Needs  Social History     Social History Narrative    Not on file     Additional psychosocial needs reviewed per nursing assessment.       Allergies   Allergen Reactions    Erythromycin Other (See Comments)     Abdominal cramping     Penicillin G Other (See Comments)     Turned yellow    Sulfa (Sulfonamide Antibiotics) [Sulfa Antibiotics] Rash    Nexium [Esomeprazole] Palpitations     Felt like BP and HR increased/palitations.    Penicillins Rash     Occurred as a small child         Medications Prior to Admission   Medication Sig Dispense Refill Last Dose/Taking    acetaminophen (TYLENOL) 325 MG tablet Take 325-975 mg by mouth every 6 hours as needed for mild pain (max of 4,000 mg in 24 hours).   Taking As Needed    cetirizine (ZYRTEC) 10 MG tablet Take 10 mg by mouth daily.   5/30/2025 Evening    ibuprofen (ADVIL/MOTRIN) 200 MG tablet Take 2-3 tablets (400-600 mg) by mouth every 6 hours as needed for pain.   Taking As Needed    minoxidil (LONITEN) 2.5 MG tablet Take 1.25 mg by mouth daily. Hair growth   5/30/2025 Evening    omeprazole (PRILOSEC) 20 MG DR capsule TAKE 1 CAPSULE BY MOUTH EVERY DAY 90 capsule 2 5/30/2025 Evening    oxyCODONE (ROXICODONE) 5 MG tablet Take 0.5-1 tablets (2.5-5 mg) by mouth every 6 hours as needed for moderate to severe pain. 10 tablet 0 Taking As Needed    polyethylene glycol (MIRALAX) 17 GM/Dose powder Take 17 g (1  Capful) by mouth daily as needed for constipation.   Taking As Needed    Semaglutide-Weight Management (WEGOVY) 2.4 MG/0.75ML pen Inject 2.4 mg subcutaneously once a week. 3 mL 5 5/30/2025 Evening    venlafaxine (EFFEXOR XR) 75 MG 24 hr capsule TAKE 1 CAPSULE BY MOUTH EVERY DAY 90 capsule 2 5/30/2025 Evening        Review of Systems:  Negative except for findings in the HPI Physical Exam:  Temp:  [98.3  F (36.8  C)-99.3  F (37.4  C)] 98.3  F (36.8  C)  Pulse:  [] 99  Resp:  [18-20] 18  BP: (110-131)/(55-68) 110/55  SpO2:  [91 %-93 %] 93 %    Gen: Pleasant in no acute distress.  HEENT: NCAT. EOMI. PERRL.  Neck: No bruit, JVD or thyromegaly.  Lungs: Clear to ascultation bilat with no crackles or wheezes.  Card: RRR. NSR. No RMG. Peripheral pulses present and symmetric. No edema.  Abd: Tenderness in the right lower quadrant.  BRITT site with cellulitis and mild purulent drainage.  Skin: No rash.  Extr: No edema.  Neuro: Alert and oriented to place time and person.          ============================    Pertinent Labs  personally reviewed.   Recent Labs   Lab 06/04/25  0611 06/03/25  0651 06/02/25  0438   WBC 21.5* 25.5* 16.5*   HGB 11.1* 13.0 13.3   HCT 34.8* 39.2 40.0   * 482* 482*       Recent Labs   Lab 06/04/25  0611 06/03/25  0651 06/02/25  0438 06/01/25  1715    136 137 137   CO2 31* 27 30* 27   BUN 6.1* 6.6* 9.2 12.6   ALBUMIN  --   --  3.4* 3.6   ALKPHOS  --   --  106 106   ALT  --   --  25 26   AST  --   --  19 27       MICROBIOLOGY DATA:  Personally reviewed   Contains abnormal data Abscess Aerobic Bacterial Culture Routine With Gram Stain  Order: [id number]   Collected 12/30/2024  4:08 PM       Status: Final result    Test Result Released: Yes (seen)    Specimen Information: Large Intestine, Colon, Descending; Abscess   1 Result Note       View Follow-Up Encounter  Culture Isolated in broth only Bacteroides thetaiotaomicron Abnormal    On day 2 of incubation  Susceptibilities not routinely  done, refer to antibiogram to view typical susceptibility profiles            Gram Stain Result  Abnormal   2+ Gram negative bacilli   4+ WBC seen   Predominantly PMNs           Resulting Agency: [facility]          Specimen Collected: 12/30/24  4:08 PM Last Resulted: 01/04/25  1:52 PM       Pertinent Radiology  personally reviewed.     Study Result    Narrative & Impression   EXAM: CT ABDOMEN PELVIS W CONTRAST  LOCATION: [facility]  DATE: 6/1/2025     INDICATION: Abdominal pain.  COMPARISON: 12/30/2024  TECHNIQUE: CT scan of the abdomen and pelvis was performed following injection of IV contrast. Multiplanar reformats were obtained. Dose reduction techniques were used.  CONTRAST: Isovue 370 90 ML     FINDINGS:   LOWER CHEST: Scattered mild atelectasis in the lung bases. No consolidation or pleural effusion.     HEPATOBILIARY: Normal liver. Gallbladder is surgically absent. Mild intrahepatic biliary ductal dilatation is mildly increased, indeterminate.     PANCREAS: Normal.     SPLEEN: Normal.     ADRENAL GLANDS: Normal.     KIDNEYS/BLADDER: Early contrast excretion in bilateral renal collecting system excludes evaluation for urinary stones. Visually benign cysts in both kidneys do not require follow-up, largest 17 mm in the right kidney. No hydroureteronephrosis. A 3 mm   high attenuation focus is seen in the left renal pelvis (series 3 image 88), not definitively present on the prior study, but no corresponding filling defect is identified on delayed images. Bladder is normal.     BOWEL: Status post partial colonic resection with colorectal anastomosis in the upper pelvis. A right abdominal approach drainage catheter traverses midline pelvis and terminates in the left abdomen. Significant fat stranding is seen along the course of   the drainage catheter in the right lower quadrant and right abdominal wall, without associated organized fluid collections. Mild wall thickening of the cecum and  proximal ascending colon adjacent to this drainage catheter is noted, likely reactive   inflammation. Scattered colonic diverticula again seen without acute diverticulitis. No primary inflammatory bowel thickening. No bowel obstruction.     LYMPH NODES: Normal.     VASCULATURE: Mild aortoiliac atherosclerotic calcification. No abdominal aortic aneurysm.     PELVIC ORGANS: Normal.     MUSCULOSKELETAL: Mild anterior osteophyte formation in the spine. No suspicious osseous lesions or acute fractures. A small focal fluid collection without enhancing rim is seen in the lower ventral abdominal wall measuring 5 x 1.3 cm, with overlying   subcutaneous fat stranding along a transverse incision.                                                                         IMPRESSION:      1.  Postoperative changes of recent partial colectomy with colorectal anastomosis.     2.  5 cm focal fluid collection without enhancing rim seen in the lower ventral abdominal wall with overlying subcutaneous fat stranding, likely postoperative seroma.     3.  Significant fat stranding in the right abdominal wall and right lower quadrant along the course of a drainage catheter, nonspecific for inflammation versus infection. No associated fluid collections.     4.  Mild wall thickening of the cecum and ascending colon adjacent to aforementioned drainage catheter, most compatible with reactive inflammation.     5.  3 mm hyperattenuating focus in the left renal pelvis without corresponding filling defect on the delayed sequence, indeterminate and may represent a small blood clot. Nevertheless, short-term follow-up CT of the abdomen and pelvis with intravenous   contrast in 3 months is recommended to ensure resolution.     6.  Mildly increased intrahepatic biliary ductal dilatation, nonspecific. Recommend correlation with bilirubin/liver function tests.     7.  Colonic diverticulosis without acute diverticulitis.

## 2025-06-05 VITALS
OXYGEN SATURATION: 94 % | TEMPERATURE: 99 F | HEART RATE: 92 BPM | BODY MASS INDEX: 39.11 KG/M2 | HEIGHT: 62 IN | WEIGHT: 212.5 LBS | DIASTOLIC BLOOD PRESSURE: 62 MMHG | SYSTOLIC BLOOD PRESSURE: 127 MMHG | RESPIRATION RATE: 20 BRPM

## 2025-06-05 LAB
ALBUMIN SERPL BCG-MCNC: 3 G/DL (ref 3.5–5.2)
ANION GAP SERPL CALCULATED.3IONS-SCNC: 8 MMOL/L (ref 7–15)
BACTERIA WND CULT: ABNORMAL
BACTERIA WND CULT: ABNORMAL
BASOPHILS # BLD AUTO: 0.1 10E3/UL (ref 0–0.2)
BASOPHILS NFR BLD AUTO: 1 %
BUN SERPL-MCNC: 9.5 MG/DL (ref 8–23)
CALCIUM SERPL-MCNC: 9 MG/DL (ref 8.8–10.4)
CHLORIDE SERPL-SCNC: 106 MMOL/L (ref 98–107)
CREAT SERPL-MCNC: 0.84 MG/DL (ref 0.51–0.95)
EGFRCR SERPLBLD CKD-EPI 2021: 79 ML/MIN/1.73M2
EOSINOPHIL # BLD AUTO: 0.4 10E3/UL (ref 0–0.7)
EOSINOPHIL NFR BLD AUTO: 2 %
ERYTHROCYTE [DISTWIDTH] IN BLOOD BY AUTOMATED COUNT: 14.3 % (ref 10–15)
GLUCOSE SERPL-MCNC: 107 MG/DL (ref 70–99)
GRAM STAIN RESULT: ABNORMAL
GRAM STAIN RESULT: ABNORMAL
HCO3 SERPL-SCNC: 29 MMOL/L (ref 22–29)
HCT VFR BLD AUTO: 32.6 % (ref 35–47)
HGB BLD-MCNC: 10.4 G/DL (ref 11.7–15.7)
IMM GRANULOCYTES # BLD: 0.1 10E3/UL
IMM GRANULOCYTES NFR BLD: 1 %
LYMPHOCYTES # BLD AUTO: 2 10E3/UL (ref 0.8–5.3)
LYMPHOCYTES NFR BLD AUTO: 14 %
MAGNESIUM SERPL-MCNC: 1.9 MG/DL (ref 1.7–2.3)
MCH RBC QN AUTO: 28 PG (ref 26.5–33)
MCHC RBC AUTO-ENTMCNC: 31.9 G/DL (ref 31.5–36.5)
MCV RBC AUTO: 88 FL (ref 78–100)
MONOCYTES # BLD AUTO: 0.8 10E3/UL (ref 0–1.3)
MONOCYTES NFR BLD AUTO: 6 %
NEUTROPHILS # BLD AUTO: 11 10E3/UL (ref 1.6–8.3)
NEUTROPHILS NFR BLD AUTO: 76 %
NRBC # BLD AUTO: 0 10E3/UL
NRBC BLD AUTO-RTO: 0 /100
PHOSPHATE SERPL-MCNC: 3.5 MG/DL (ref 2.5–4.5)
PLATELET # BLD AUTO: 475 10E3/UL (ref 150–450)
POTASSIUM SERPL-SCNC: 3.9 MMOL/L (ref 3.4–5.3)
RBC # BLD AUTO: 3.71 10E6/UL (ref 3.8–5.2)
SODIUM SERPL-SCNC: 143 MMOL/L (ref 135–145)
VANCOMYCIN SERPL-MCNC: 13.7 UG/ML (ref ?–25)
WBC # BLD AUTO: 14.4 10E3/UL (ref 4–11)

## 2025-06-05 PROCEDURE — 250N000011 HC RX IP 250 OP 636: Mod: JZ | Performed by: INTERNAL MEDICINE

## 2025-06-05 PROCEDURE — 36415 COLL VENOUS BLD VENIPUNCTURE: CPT

## 2025-06-05 PROCEDURE — 85025 COMPLETE CBC W/AUTO DIFF WBC: CPT

## 2025-06-05 PROCEDURE — 250N000013 HC RX MED GY IP 250 OP 250 PS 637: Performed by: INTERNAL MEDICINE

## 2025-06-05 PROCEDURE — 80069 RENAL FUNCTION PANEL: CPT | Performed by: HOSPITALIST

## 2025-06-05 PROCEDURE — 250N000011 HC RX IP 250 OP 636: Mod: JZ

## 2025-06-05 PROCEDURE — 99232 SBSQ HOSP IP/OBS MODERATE 35: CPT | Performed by: HOSPITALIST

## 2025-06-05 PROCEDURE — 250N000011 HC RX IP 250 OP 636: Performed by: STUDENT IN AN ORGANIZED HEALTH CARE EDUCATION/TRAINING PROGRAM

## 2025-06-05 PROCEDURE — 120N000001 HC R&B MED SURG/OB

## 2025-06-05 PROCEDURE — 258N000003 HC RX IP 258 OP 636: Performed by: STUDENT IN AN ORGANIZED HEALTH CARE EDUCATION/TRAINING PROGRAM

## 2025-06-05 PROCEDURE — 83735 ASSAY OF MAGNESIUM: CPT | Performed by: HOSPITALIST

## 2025-06-05 PROCEDURE — 99232 SBSQ HOSP IP/OBS MODERATE 35: CPT | Performed by: STUDENT IN AN ORGANIZED HEALTH CARE EDUCATION/TRAINING PROGRAM

## 2025-06-05 PROCEDURE — 250N000013 HC RX MED GY IP 250 OP 250 PS 637: Performed by: HOSPITALIST

## 2025-06-05 PROCEDURE — 36415 COLL VENOUS BLD VENIPUNCTURE: CPT | Performed by: HOSPITALIST

## 2025-06-05 PROCEDURE — 80202 ASSAY OF VANCOMYCIN: CPT | Performed by: HOSPITALIST

## 2025-06-05 RX ORDER — MAGNESIUM OXIDE 400 MG/1
400 TABLET ORAL EVERY 4 HOURS
Status: COMPLETED | OUTPATIENT
Start: 2025-06-05 | End: 2025-06-05

## 2025-06-05 RX ADMIN — ACETAMINOPHEN 650 MG: 325 TABLET ORAL at 08:45

## 2025-06-05 RX ADMIN — KETOROLAC TROMETHAMINE 30 MG: 30 INJECTION, SOLUTION INTRAMUSCULAR at 21:31

## 2025-06-05 RX ADMIN — MAGNESIUM OXIDE TAB 400 MG (241.3 MG ELEMENTAL MG) 400 MG: 400 (241.3 MG) TAB at 14:34

## 2025-06-05 RX ADMIN — METRONIDAZOLE 500 MG: 500 INJECTION, SOLUTION INTRAVENOUS at 21:33

## 2025-06-05 RX ADMIN — CETIRIZINE HYDROCHLORIDE 10 MG: 10 TABLET, FILM COATED ORAL at 08:26

## 2025-06-05 RX ADMIN — CIPROFLOXACIN 400 MG: 400 INJECTION, SOLUTION INTRAVENOUS at 22:10

## 2025-06-05 RX ADMIN — VENLAFAXINE HYDROCHLORIDE 75 MG: 75 CAPSULE, EXTENDED RELEASE ORAL at 21:30

## 2025-06-05 RX ADMIN — CIPROFLOXACIN 400 MG: 400 INJECTION, SOLUTION INTRAVENOUS at 08:46

## 2025-06-05 RX ADMIN — HYDROMORPHONE HYDROCHLORIDE 1 MG: 2 TABLET ORAL at 14:34

## 2025-06-05 RX ADMIN — MAGNESIUM OXIDE TAB 400 MG (241.3 MG ELEMENTAL MG) 400 MG: 400 (241.3 MG) TAB at 10:25

## 2025-06-05 RX ADMIN — METRONIDAZOLE 500 MG: 500 INJECTION, SOLUTION INTRAVENOUS at 08:29

## 2025-06-05 RX ADMIN — ACETAMINOPHEN 650 MG: 325 TABLET ORAL at 21:49

## 2025-06-05 RX ADMIN — ACETAMINOPHEN 650 MG: 325 TABLET ORAL at 01:39

## 2025-06-05 RX ADMIN — SODIUM CHLORIDE 1250 MG: 0.9 INJECTION, SOLUTION INTRAVENOUS at 23:14

## 2025-06-05 RX ADMIN — KETOROLAC TROMETHAMINE 30 MG: 30 INJECTION, SOLUTION INTRAMUSCULAR at 03:31

## 2025-06-05 RX ADMIN — ACETAMINOPHEN 650 MG: 325 TABLET ORAL at 14:34

## 2025-06-05 RX ADMIN — HYDROMORPHONE HYDROCHLORIDE 1 MG: 2 TABLET ORAL at 08:26

## 2025-06-05 RX ADMIN — MINOXIDIL 1.25 MG: 2.5 TABLET ORAL at 21:32

## 2025-06-05 RX ADMIN — PANTOPRAZOLE SODIUM 40 MG: 40 TABLET, DELAYED RELEASE ORAL at 21:29

## 2025-06-05 RX ADMIN — HYDROMORPHONE HYDROCHLORIDE 1 MG: 2 TABLET ORAL at 21:48

## 2025-06-05 RX ADMIN — KETOROLAC TROMETHAMINE 30 MG: 30 INJECTION, SOLUTION INTRAMUSCULAR at 16:11

## 2025-06-05 RX ADMIN — HYDROMORPHONE HYDROCHLORIDE 1 MG: 2 TABLET ORAL at 01:39

## 2025-06-05 RX ADMIN — SODIUM CHLORIDE 1250 MG: 0.9 INJECTION, SOLUTION INTRAVENOUS at 12:01

## 2025-06-05 RX ADMIN — KETOROLAC TROMETHAMINE 30 MG: 30 INJECTION, SOLUTION INTRAMUSCULAR at 08:51

## 2025-06-05 ASSESSMENT — ACTIVITIES OF DAILY LIVING (ADL)
ADLS_ACUITY_SCORE: 40
ADLS_ACUITY_SCORE: 40
ADLS_ACUITY_SCORE: 42
ADLS_ACUITY_SCORE: 39
ADLS_ACUITY_SCORE: 40
ADLS_ACUITY_SCORE: 42
ADLS_ACUITY_SCORE: 41
ADLS_ACUITY_SCORE: 40
ADLS_ACUITY_SCORE: 42
ADLS_ACUITY_SCORE: 42
ADLS_ACUITY_SCORE: 41
ADLS_ACUITY_SCORE: 40
ADLS_ACUITY_SCORE: 41
ADLS_ACUITY_SCORE: 40
ADLS_ACUITY_SCORE: 40
ADLS_ACUITY_SCORE: 42
ADLS_ACUITY_SCORE: 41
ADLS_ACUITY_SCORE: 41
ADLS_ACUITY_SCORE: 40
ADLS_ACUITY_SCORE: 40

## 2025-06-05 NOTE — PROGRESS NOTES
General Surgery Progress Note:    Hospital Day # 4    ASSESSMENT:  1. Intra-abdominal fluid collection    2. Right lower quadrant abdominal pain        Gia CONCHITA Sultana is a 59 year old female who is s/p robotic sigmoidectomy on 05/22 who is currently admitted for postoperative pain localized to RLQ previous drain site. CT scan obtained on 06/01 revealing postoperative seroma with RLQ subcutaneous fat stranding without intra-abdominal fluid collections. Stool studies revealing + c-diff, UA + leukocytes.     Patient remained stable with stable vital signs and afebrile.  Her leukocytosis had been trending downwards yesterday.  We are still waiting for today's laboratory work.  I did evaluate the CT scan and although there is some stranding around the right lower quadrant, there does not.  Discrete area of fluid collection that would require any drainage procedure at this time.    In regards to her initial urinary symptoms, it is improving and it is again likely result of close proximity to the inflammation within the abdomen from surgery.    PLAN:  - If the patient continues to have an improvement in her leukocytosis, she may likely be discharged in 24 to 48 hours.  - Will continue with pain control at this time  - Educated patient on the benefits importance of staying active during her hospitalization  - Continue with low fiber diet.  - Appreciate infectious disease recommendations  - Surgery team following with you    SUBJECTIVE:   Patient remains afebrile overnight.  The CT scan was completed yesterday night.  Patient states that she is tolerating diet.  Still with some liquidy stools.  Symptoms have improved.  No nausea no vomiting.  Her right lower quad abdominal pain is improving.    Patient Vitals for the past 24 hrs:   BP Temp Temp src Pulse Resp SpO2   06/05/25 0331 121/59 97.9  F (36.6  C) Oral -- 18 95 %   06/04/25 2330 104/56 98  F (36.7  C) Oral -- 20 93 %   06/04/25 2012 114/56 -- -- 98 -- --   06/04/25  1612 109/54 97.8  F (36.6  C) Oral -- 20 92 %   06/04/25 1140 112/54 98.1  F (36.7  C) Tympanic -- 24 (!) 91 %   06/04/25 0800 110/55 98.3  F (36.8  C) Oral 99 18 93 %         PHYSICAL EXAM:  General: patient seen resting in bed, no acute distress  Resp: no respiratory distress, breathing comfortably on room air.   Abdomen: Obese abdomen.  Soft, improvement in tenderness to palpation over the right lower quadrant.  Again there is some fibrinous tissue around the old drainage site.  No significant fluid drainage.  The Pfannenstiel incision remains intact with some sloughing of skin.  Musculoskeletal: Good range of motion.  Good strength.    Flaco Mendoza DO  General Surgeon  [facility]  Surgery [facility] - [address]  [place] [address]?  Office: [phone]  Employed by - [facility]  Pager: [phone]

## 2025-06-05 NOTE — PLAN OF CARE
Problem: Pain Acute  Goal: Optimal Pain Control and Function  Outcome: Progressing  Intervention: Prevent or Manage Pain  Recent Flowsheet Documentation  Taken 6/5/2025 [id number] by Lola Weir RN  Medication Review/Management: medications reviewed     Problem: Infection  Goal: Absence of Infection Signs and Symptoms  Outcome: Progressing   Goal Outcome Evaluation:         Pt alert and able to make needs know. Pt's pain was managed with scheduled and prn medication and effective. Pt's  visited at bedside and attentive to Pt. Call light within reach.                    Pleurex drained w/ assist from RN;    Sterile technique used; approx 5cc after 20 minutes of drainage; pt tolerate drainage well;     f/u Cx

## 2025-06-05 NOTE — PROGRESS NOTES
"[facility] Inpatient follow up       Patient:  Gia Sultana  Date of birth 1965, Medical record number [id number]  Date of Visit:  06/05/2025  Attending Physician: Phill Lubin MD         Assessment and Recommendations:   Assessment:  Gia Sultana is a 59 year old female with   Morbid obesity with BMI of 39.  History of perforated diverticulitis.  Status post IR guided drain placement.  Recent history of sigmoid colectomy with splenic flexure takedown on 5/22/2025.  Readmitted on 6/1/2025 with postoperative fluid collection associated with leukocytosis.  Started on Cipro and Flagyl.  IV vancomycin added on 6/3/2025.  She is MRSA negative on screening.  Clinically having some purulent drainage from BRITT site on the right flank as well as right lower quadrant tenderness. Repeat CT on 6/4 with no abscess. WBC improved down to 14. On IV Vanco + Cipro + Flagyl. Cultures from wound in process. Gram stain with GPC.   Positive C. difficile testing consistent with colonization.  P.o. vancomycin 125 mg started on 6/4/2025.  Reported 1 semiformed stool on 6/3/2025.  C. difficile is unlikely.      Recommendations:  Continue IV Vanco, Cipro and Flagyl   Follow pending cultures from 6/4.   Monitor WBC   Monitor BM    Discussed with the patient and nursing staff    ID will follow       Keyon Cheng MD.  [facility].   [facility]  Office Telephone [phone].  Fax [phone]  [facility] paging            Interval History:     HPI:  The interval history was reviewed.   Feels much better today.  Less right flank pain.  CT scan reviewed.  Cultures in process.    Pertinent cultures include:  No results found for: \"CULT\"    Recent Inflammatory Biomarkers:   Recent Labs   Lab Test 06/05/25  0716 06/04/25  0611 06/03/25  0651 06/02/25  0438 06/01/25  1715 05/25/25  0637 05/24/25  0645   PCAL  --   --   --   --   --   --  0.28   WBC " 14.4* 21.5* 25.5* 16.5* 16.5* 10.9 13.0*            Review of Systems:   CONSTITUTIONAL:    Temp Max: Temp (24hrs), Av  F (36.7  C), Min:97.8  F (36.6  C), Max:98.1  F (36.7  C)   .  Negative except for findings in the HPI.           Current Medications (antimicrobials listed in bold):     Current Facility-Administered Medications   Medication Dose Route Frequency Provider Last Rate Last Admin    cetirizine (zyrTEC) tablet 10 mg  10 mg Oral QPM Jose Walters MD   10 mg at 25 08    ciprofloxacin (CIPRO) infusion 400 mg  400 mg Intravenous Q12H Jose Walters  mL/hr at 25 400 mg at 25    ketorolac (TORADOL) injection 30 mg  30 mg Intravenous Q6H Kim Torres PA   30 mg at 25 033    metroNIDAZOLE (FLAGYL) infusion 500 mg  500 mg Intravenous Q12H Jose Walters  mL/hr at 25 500 mg at 25    minoxidil (LONITEN) half-tab 1.25 mg  1.25 mg Oral QPM Jose Walters MD   1.25 mg at 25    pantoprazole (PROTONIX) EC tablet 40 mg  40 mg Oral QPM Jose Walters MD   40 mg at 25    sodium chloride (PF) 0.9% PF flush 3 mL  3 mL Intracatheter Q8H [facility] Jose Walters MD   3 mL at 25    vancomycin (VANCOCIN) 1,250 mg in sodium chloride 0.9 % 250 mL intermittent infusion  1,250 mg Intravenous Q12H Keyon Cheng MD   1,250 mg at 25    venlafaxine (EFFEXOR XR) 24 hr capsule 75 mg  75 mg Oral QPM Jose Walters MD   75 mg at 25              Allergies:     Allergies   Allergen Reactions    Erythromycin Other (See Comments)     Abdominal cramping     Penicillin G Other (See Comments)     Turned yellow    Sulfa (Sulfonamide Antibiotics) [Sulfa Antibiotics] Rash    Nexium [Esomeprazole] Palpitations     Felt like BP and HR increased/palitations.    Penicillins Rash     Occurred as a small child             Physical Exam:   Vitals were reviewed  Patient Vitals for the past 24 hrs:   BP Temp Temp  src Pulse Resp SpO2   06/05/25 0331 121/59 97.9  F (36.6  C) Oral -- 18 95 %   06/04/25 2330 104/56 98  F (36.7  C) Oral -- 20 93 %   06/04/25 2012 114/56 -- -- 98 -- --   06/04/25 1612 109/54 97.8  F (36.6  C) Oral -- 20 92 %   06/04/25 1140 112/54 98.1  F (36.7  C) Tympanic -- 24 (!) 91 %       Physical Examination:  Gen: Pleasant in no acute distress.  HEENT: NCAT. EOMI. PERRL.  Neck: No bruit, JVD or thyromegaly.  Lungs: Clear to ascultation bilat with no crackles or wheezes.  Card: RRR. NSR. No RMG. Peripheral pulses present and symmetric. No edema.  Abd:  No mass. Normal bowel sounds.  Less tenderness in right flank.  Skin: No rash.  Extr: No edema.  Neuro: Alert and oriented to place time and person.       Laboratory Data:   ID Labs:  Microbiology labs:  Reviewed.    No lab results found.  Recent Labs   Lab Test 06/05/25  0716 06/04/25  0611 06/03/25  0651 06/02/25  0438 06/01/25  1715 05/25/25  0637   WBC 14.4* 21.5* 25.5* 16.5* 16.5* 10.9     Recent Labs   Lab Test 06/04/25  0611 06/03/25  0651 06/02/25  0438 06/01/25  1715   CR 0.65 0.67 0.73 0.63   GFRESTIMATED >90 >90 >90 >90       Hematology Studies  Recent Labs   Lab Test 06/05/25  0716 06/04/25  0611 06/03/25  0651 06/02/25  0438 06/01/25  1715 05/25/25  0637 12/31/24  0633 12/30/24  1205   WBC 14.4* 21.5* 25.5* 16.5* 16.5* 10.9   < > 13.7*   ANEU 11.0* 18.1*  --  12.9* 12.3* 6.6  --  9.5*   AEOS 0.4 0.3  --  0.1 0.3 0.5  --  0.1   HGB 10.4* 11.1* 13.0 13.3 14.3 12.5   < > 14.3   HCT 32.6* 34.8* 39.2 40.0 43.0 40.0   < > 43.1   * 484* 482* 482* 511* 304   < > 439    < > = values in this interval not displayed.       Metabolic  Recent Labs   Lab Test 06/04/25  0611 06/03/25  0651 06/02/25  0438    136 137   BUN 6.1* 6.6* 9.2   CO2 31* 27 30*   CR 0.65 0.67 0.73   GFRESTIMATED >90 >90 >90       Hepatic Studies  Recent Labs   Lab Test 06/02/25  0438 06/01/25  1715 05/25/25  0637 05/24/25  0645 12/30/24  1147 08/05/24  1152 11/17/22  0928    BILITOTAL 0.4 0.4  --   --  0.2  --  0.4   ALKPHOS 106 106  --   --  110  --  83   ALBUMIN 3.4* 3.6 3.3*   < > 3.7  --  5.0   AST 19 27  --   --   --   --  26   ALT 25 26  --   --   --  50 26    < > = values in this interval not displayed.       ImmunologlobulinsNo lab results found.         Imaging Data:   Reviewed     Study Result    Narrative & Impression   EXAM: CT ABDOMEN PELVIS W CONTRAST  LOCATION: [facility]  DATE: 6/4/2025     INDICATION: Right lower quadrant pain.  Look for abscess.  COMPARISON: 6/1/2025  TECHNIQUE: CT scan of the abdomen and pelvis was performed following injection of IV contrast. Multiplanar reformats were obtained. Dose reduction techniques were used.  CONTRAST: 90ml isovue 370     FINDINGS:   LOWER CHEST: Linear discoid atelectasis or scar at lung bases.     HEPATOBILIARY: Cholecystectomy.     PANCREAS: Normal.     SPLEEN: Normal.     ADRENAL GLANDS: Normal.     KIDNEYS/BLADDER: Small renal cysts need no further workup. Small calcification left mid kidney appears to be cortical. There is no hydronephrosis. Bladder is collapsed and empty, there does appear to be some bladder wall thickening.     BOWEL: Previous sigmoid resection. No obstruction. There is been interval removal of the long surgical drain that entered from right low flank, looped into the pelvis and extended into the left midabdomen; there is a slight amount of fluid and air are   seen along the track of this tube but there is no suspicious or undrained fluid collections.     LYMPH NODES: No significant lymphadenopathy.     VASCULATURE: Normal.     PELVIC ORGANS: No pelvic mass or free fluid.     MUSCULOSKELETAL: Low transverse incision with skin staples still present. Soft tissue stranding and minimal intramuscular fluid at site of low incision but decreased size compared the previous study. Likewise there is intramuscular thickening along   lateral right flank at site of previous large bore  peritoneal drain but no suspicious abdominal wall collection.                                                                      IMPRESSION:   1.  Interval removal of long surgical drain. No residual or suspicious fluid collection.

## 2025-06-05 NOTE — PLAN OF CARE
Problem: Adult Inpatient Plan of Care  Goal: Optimal Comfort and Wellbeing  Outcome: Progressing   Goal Outcome Evaluation:    Pt is A+O x4, on RA. Has mild abd pain managed by prn dilaudid and tylenol x2, plus scheduled Toradol. Lap sites are CDI. NSR on tele. Mg protocol recheck in AM. Went down for repeat CT this evening, results pending.  Up independently in the room. Visited with family at bedside. Pt is able to make her needs known.     Lor Lewis RN.

## 2025-06-05 NOTE — PROGRESS NOTES
[facility]    Medicine Progress Note - Hospitalist Service    Date of Admission:  6/1/2025    Assessment & Plan   Gia Sultana is a 59-year-old female with past medical history of obesity, mood disorder and history of diverticulitis with perforation that required IR placement of drainage tube and recent hospitalization for planned sigmoid colectomy with splenic flexure takedown on 5/22/2025 who presented to the emergency room for worsening abdominal pain.  Found to have postoperative fluid collection with seroma.      Abdominal pain with postoperative seroma  Cellulitis around drainage site of abdominal wall  Leukocytosis   - S/P uncomplicated Robot assisted laparoscopic sigmoid colectomy with splenic flexure takedown on 5/22/2025  - CT abdomen shows 5 cm focal fluid collection without enhancing rim seen in the lower ventral abdominal wall with overlying subcutaneous fat stranding, likely postoperative seroma  - Normal lactic acid but elevated white blood cells, CRP elevated 158.2  - Surgical consult, appreciate input  - Drain removed by surgery 6/2  - Continue IV antibiotic empirically with Flagyl and Cipro, vancomycin started 6/3  - Low fiber diet  - Received IV fluids 6/1 to 6/2, now discontinued  - Oral Dilaudid as needed  - UA contaminated with 10 squamous cells  - Ucx pending  - C. Dif showing carriage, toxigenic disease negative  - MRSA swab ordered  - ID consulted  - Repeat CT scan 6/4 with resolving fluid collections     Depression/anxiety  - Continue PTA venlafaxine     Hypomagnesemia  -Replace per protocol     Obesity class II  - PTA Wegovy on hold while inpatient      Hair loss  -Continue PTA minoxidil          Diet: Low Fiber Diet    DVT Prophylaxis: Pneumatic Compression Devices  Curiel Catheter: Not present  Lines: None     Cardiac Monitoring: ACTIVE order. Indication: Tachyarrhythmias, acute (48 hours)  Code Status: Full Code      Clinically Significant Risk Factors        #  "Hypokalemia: Lowest K = 3.3 mmol/L in last 2 days, will replace as needed        # Hypoalbuminemia: Lowest albumin = 3 g/dL at 6/5/2025  7:16 AM, will monitor as appropriate     # Hypertension: Noted on problem list            # Obesity: Estimated body mass index is 38.87 kg/m  as calculated from the following:    Height as of this encounter: 1.575 m (5' 2\").    Weight as of this encounter: 96.4 kg (212 lb 8 oz)., PRESENT ON ADMISSION            Social Drivers of Health    Tobacco Use: High Risk (5/22/2025)    Patient History     Smoking Tobacco Use: Every Day     Smokeless Tobacco Use: Never          Disposition Plan     Medically Ready for Discharge: Anticipated Tomorrow             Phill Lubin MD  Hospitalist Service  [facility]  Securely message with [employer] (more info)  Text page via [employer] Paging/Directory   ______________________________________________________________________    Interval History   Feels slightly better.    Physical Exam   Vital Signs: Temp: 98  F (36.7  C) Temp src: Oral BP: 114/56 Pulse: 98   Resp: 18 SpO2: 93 % O2 Device: None (Room air)    Weight: 212 lbs 8 oz    Alert, no distress, heart and lungs normal, abdomen minimally tender right lower quadrant, no rebound, legs without edema    Medical Decision Making       47 MINUTES SPENT BY ME on the date of service doing chart review, history, exam, documentation & further activities per the note.  NOTE(S)/MEDICAL RECORDS REVIEWED over the past 24 hours: Vitals, labs, new imaging, documentation and medication administrations      Data     I have personally reviewed the following data over the past 24 hrs:    14.4 (H)  \   10.4 (L)   / 475 (H)     143 106 9.5 /  107 (H)   3.9 29 0.84 \     ALT: N/A AST: N/A AP: N/A TBILI: N/A   ALB: 3.0 (L) TOT PROTEIN: N/A LIPASE: N/A       Imaging results reviewed over the past 24 hrs:   Recent Results (from the past 24 hours)   CT Abdomen Pelvis w Contrast    Narrative    EXAM: CT " ABDOMEN PELVIS W CONTRAST  LOCATION: [facility]  DATE: 6/4/2025    INDICATION: Right lower quadrant pain.  Look for abscess.  COMPARISON: 6/1/2025  TECHNIQUE: CT scan of the abdomen and pelvis was performed following injection of IV contrast. Multiplanar reformats were obtained. Dose reduction techniques were used.  CONTRAST: 90ml isovue 370    FINDINGS:   LOWER CHEST: Linear discoid atelectasis or scar at lung bases.    HEPATOBILIARY: Cholecystectomy.    PANCREAS: Normal.    SPLEEN: Normal.    ADRENAL GLANDS: Normal.    KIDNEYS/BLADDER: Small renal cysts need no further workup. Small calcification left mid kidney appears to be cortical. There is no hydronephrosis. Bladder is collapsed and empty, there does appear to be some bladder wall thickening.    BOWEL: Previous sigmoid resection. No obstruction. There is been interval removal of the long surgical drain that entered from right low flank, looped into the pelvis and extended into the left midabdomen; there is a slight amount of fluid and air are   seen along the track of this tube but there is no suspicious or undrained fluid collections.    LYMPH NODES: No significant lymphadenopathy.    VASCULATURE: Normal.    PELVIC ORGANS: No pelvic mass or free fluid.    MUSCULOSKELETAL: Low transverse incision with skin staples still present. Soft tissue stranding and minimal intramuscular fluid at site of low incision but decreased size compared the previous study. Likewise there is intramuscular thickening along   lateral right flank at site of previous large bore peritoneal drain but no suspicious abdominal wall collection.      Impression    IMPRESSION:   1.  Interval removal of long surgical drain. No residual or suspicious fluid collection.

## 2025-06-05 NOTE — PLAN OF CARE
Problem: Pain Acute  Goal: Optimal Pain Control and Function  Outcome: Progressing  Intervention: Optimize Psychosocial Wellbeing  Recent Flowsheet Documentation  Taken 6/5/2025 0200 by Aldo Erazo RN  Diversional Activities: smartphone  Taken 6/4/2025 2000 by Aldo Erazo RN  Diversional Activities: smartphone  Intervention: Prevent or Manage Pain  Recent Flowsheet Documentation  Taken 6/5/2025 0200 by Aldo Erazo, RN  Medication Review/Management:   medications reviewed   high-risk medications identified  Taken 6/4/2025 2000 by Aldo Erazo RN  Medication Review/Management:   medications reviewed   high-risk medications identified     Problem: Skin Injury Risk Increased  Goal: Skin Health and Integrity  Outcome: Progressing     Pt A/Ox4,  pleasant, calls appropriately,  VS on RA, O2 Sats WNL. PRN dilaudid administered at bedtime Pt walks to the bathroom with standby assist x 1 . Lap sites clean dry and intact.  Dressing intact to wound site, where the drain was removed. Sinus rhythm, can be Tarchy.  On ATB, no s/s of adverse reaction noted.

## 2025-06-06 VITALS
OXYGEN SATURATION: 93 % | RESPIRATION RATE: 18 BRPM | WEIGHT: 224.6 LBS | TEMPERATURE: 98.5 F | HEIGHT: 62 IN | HEART RATE: 93 BPM | BODY MASS INDEX: 41.33 KG/M2 | SYSTOLIC BLOOD PRESSURE: 144 MMHG | DIASTOLIC BLOOD PRESSURE: 65 MMHG

## 2025-06-06 LAB
ALBUMIN SERPL BCG-MCNC: 2.9 G/DL (ref 3.5–5.2)
ANION GAP SERPL CALCULATED.3IONS-SCNC: 7 MMOL/L (ref 7–15)
BACTERIA WND CULT: ABNORMAL
BASOPHILS # BLD AUTO: 0.1 10E3/UL (ref 0–0.2)
BASOPHILS NFR BLD AUTO: 1 %
BUN SERPL-MCNC: 11 MG/DL (ref 8–23)
CALCIUM SERPL-MCNC: 8.6 MG/DL (ref 8.8–10.4)
CHLORIDE SERPL-SCNC: 104 MMOL/L (ref 98–107)
CREAT SERPL-MCNC: 0.8 MG/DL (ref 0.51–0.95)
EGFRCR SERPLBLD CKD-EPI 2021: 84 ML/MIN/1.73M2
EOSINOPHIL # BLD AUTO: 0.7 10E3/UL (ref 0–0.7)
EOSINOPHIL NFR BLD AUTO: 6 %
ERYTHROCYTE [DISTWIDTH] IN BLOOD BY AUTOMATED COUNT: 14.3 % (ref 10–15)
GLUCOSE SERPL-MCNC: 102 MG/DL (ref 70–99)
GRAM STAIN RESULT: ABNORMAL
GRAM STAIN RESULT: ABNORMAL
HCO3 SERPL-SCNC: 30 MMOL/L (ref 22–29)
HCT VFR BLD AUTO: 32.4 % (ref 35–47)
HGB BLD-MCNC: 10.3 G/DL (ref 11.7–15.7)
IMM GRANULOCYTES # BLD: 0.1 10E3/UL
IMM GRANULOCYTES NFR BLD: 1 %
LYMPHOCYTES # BLD AUTO: 1.8 10E3/UL (ref 0.8–5.3)
LYMPHOCYTES NFR BLD AUTO: 16 %
MAGNESIUM SERPL-MCNC: 1.8 MG/DL (ref 1.7–2.3)
MCH RBC QN AUTO: 27.7 PG (ref 26.5–33)
MCHC RBC AUTO-ENTMCNC: 31.8 G/DL (ref 31.5–36.5)
MCV RBC AUTO: 87 FL (ref 78–100)
MONOCYTES # BLD AUTO: 0.7 10E3/UL (ref 0–1.3)
MONOCYTES NFR BLD AUTO: 6 %
NEUTROPHILS # BLD AUTO: 8.1 10E3/UL (ref 1.6–8.3)
NEUTROPHILS NFR BLD AUTO: 71 %
NRBC # BLD AUTO: 0 10E3/UL
NRBC BLD AUTO-RTO: 0 /100
PHOSPHATE SERPL-MCNC: 3 MG/DL (ref 2.5–4.5)
PLATELET # BLD AUTO: 507 10E3/UL (ref 150–450)
POTASSIUM SERPL-SCNC: 3.9 MMOL/L (ref 3.4–5.3)
RBC # BLD AUTO: 3.72 10E6/UL (ref 3.8–5.2)
SODIUM SERPL-SCNC: 141 MMOL/L (ref 135–145)
WBC # BLD AUTO: 11.4 10E3/UL (ref 4–11)

## 2025-06-06 PROCEDURE — 250N000013 HC RX MED GY IP 250 OP 250 PS 637: Performed by: INTERNAL MEDICINE

## 2025-06-06 PROCEDURE — 250N000011 HC RX IP 250 OP 636: Mod: JZ | Performed by: INTERNAL MEDICINE

## 2025-06-06 PROCEDURE — 83735 ASSAY OF MAGNESIUM: CPT | Performed by: HOSPITALIST

## 2025-06-06 PROCEDURE — 85025 COMPLETE CBC W/AUTO DIFF WBC: CPT

## 2025-06-06 PROCEDURE — 82310 ASSAY OF CALCIUM: CPT | Performed by: HOSPITALIST

## 2025-06-06 PROCEDURE — 250N000011 HC RX IP 250 OP 636: Mod: JZ

## 2025-06-06 PROCEDURE — 99232 SBSQ HOSP IP/OBS MODERATE 35: CPT | Performed by: STUDENT IN AN ORGANIZED HEALTH CARE EDUCATION/TRAINING PROGRAM

## 2025-06-06 PROCEDURE — 250N000013 HC RX MED GY IP 250 OP 250 PS 637: Performed by: STUDENT IN AN ORGANIZED HEALTH CARE EDUCATION/TRAINING PROGRAM

## 2025-06-06 PROCEDURE — 99239 HOSP IP/OBS DSCHRG MGMT >30: CPT | Performed by: INTERNAL MEDICINE

## 2025-06-06 PROCEDURE — 36415 COLL VENOUS BLD VENIPUNCTURE: CPT | Performed by: HOSPITALIST

## 2025-06-06 RX ORDER — METRONIDAZOLE 500 MG/1
500 TABLET ORAL 3 TIMES DAILY
Qty: 30 TABLET | Refills: 0 | Status: SHIPPED | OUTPATIENT
Start: 2025-06-06 | End: 2025-06-16

## 2025-06-06 RX ORDER — METRONIDAZOLE 500 MG/1
500 TABLET ORAL 3 TIMES DAILY
Status: DISCONTINUED | OUTPATIENT
Start: 2025-06-06 | End: 2025-06-06 | Stop reason: HOSPADM

## 2025-06-06 RX ORDER — DOXYCYCLINE 100 MG/1
100 CAPSULE ORAL EVERY 12 HOURS
Qty: 20 CAPSULE | Refills: 0 | Status: SHIPPED | OUTPATIENT
Start: 2025-06-06 | End: 2025-06-16

## 2025-06-06 RX ORDER — MAGNESIUM OXIDE 400 MG/1
400 TABLET ORAL EVERY 4 HOURS
Status: DISCONTINUED | OUTPATIENT
Start: 2025-06-06 | End: 2025-06-06 | Stop reason: HOSPADM

## 2025-06-06 RX ORDER — ONDANSETRON 4 MG/1
4 TABLET, ORALLY DISINTEGRATING ORAL EVERY 8 HOURS PRN
Qty: 30 TABLET | Refills: 0 | Status: SHIPPED | OUTPATIENT
Start: 2025-06-06

## 2025-06-06 RX ORDER — METRONIDAZOLE 500 MG/1
500 TABLET ORAL 3 TIMES DAILY
Status: DISCONTINUED | OUTPATIENT
Start: 2025-06-06 | End: 2025-06-06

## 2025-06-06 RX ORDER — DOXYCYCLINE 100 MG/1
100 CAPSULE ORAL EVERY 12 HOURS SCHEDULED
Status: DISCONTINUED | OUTPATIENT
Start: 2025-06-06 | End: 2025-06-06 | Stop reason: HOSPADM

## 2025-06-06 RX ORDER — OXYCODONE HYDROCHLORIDE 5 MG/1
2.5-5 TABLET ORAL EVERY 6 HOURS PRN
Qty: 15 TABLET | Refills: 0 | Status: SHIPPED | OUTPATIENT
Start: 2025-06-06

## 2025-06-06 RX ADMIN — DOXYCYCLINE 100 MG: 100 CAPSULE ORAL at 10:37

## 2025-06-06 RX ADMIN — ACETAMINOPHEN 650 MG: 325 TABLET ORAL at 14:21

## 2025-06-06 RX ADMIN — HYDROMORPHONE HYDROCHLORIDE 1 MG: 2 TABLET ORAL at 14:21

## 2025-06-06 RX ADMIN — HYDROMORPHONE HYDROCHLORIDE 1 MG: 2 TABLET ORAL at 09:05

## 2025-06-06 RX ADMIN — METRONIDAZOLE 500 MG: 500 INJECTION, SOLUTION INTRAVENOUS at 08:43

## 2025-06-06 RX ADMIN — ACETAMINOPHEN 650 MG: 325 TABLET ORAL at 09:05

## 2025-06-06 RX ADMIN — CETIRIZINE HYDROCHLORIDE 10 MG: 10 TABLET, FILM COATED ORAL at 08:43

## 2025-06-06 RX ADMIN — KETOROLAC TROMETHAMINE 30 MG: 30 INJECTION, SOLUTION INTRAMUSCULAR at 08:43

## 2025-06-06 RX ADMIN — KETOROLAC TROMETHAMINE 30 MG: 30 INJECTION, SOLUTION INTRAMUSCULAR at 03:12

## 2025-06-06 RX ADMIN — METRONIDAZOLE 500 MG: 500 TABLET ORAL at 14:22

## 2025-06-06 RX ADMIN — MAGNESIUM OXIDE TAB 400 MG (241.3 MG ELEMENTAL MG) 400 MG: 400 (241.3 MG) TAB at 12:07

## 2025-06-06 ASSESSMENT — ACTIVITIES OF DAILY LIVING (ADL)
ADLS_ACUITY_SCORE: 40
ADLS_ACUITY_SCORE: 42
ADLS_ACUITY_SCORE: 40
ADLS_ACUITY_SCORE: 42
ADLS_ACUITY_SCORE: 40
ADLS_ACUITY_SCORE: 42
ADLS_ACUITY_SCORE: 40

## 2025-06-06 NOTE — PROGRESS NOTES
General Surgery Progress Note:    Hospital Day # 5    ASSESSMENT:  1. Intra-abdominal fluid collection    2. Right lower quadrant abdominal pain        Gia CONCHITA Sultana is a 59 year old female who is s/p robotic sigmoidectomy on 05/22 who is currently admitted for postoperative pain localized to RLQ previous drain site. CT scan obtained on 06/01 revealing postoperative seroma with RLQ subcutaneous fat stranding without intra-abdominal fluid collections. Stool studies revealing + c-diff, UA + leukocytes.     Another leukocytosis yesterday with white count at 14 and trending downwards.  Patient continues to have stable vital signs with less abdominal pain on physical exam.    PLAN:  - If patient continues to do well, she can be discharged today from my surgical standpoint.  - Final cultures to direct antibiotic decision with ID  - Continue medical management per primary team.  - Diet as tolerated  - Surgical team following with you    SUBJECTIVE:   Patient seen on a.m. rounds.  Patient states that she has improvement in her abdominal symptoms.  She is optimistic about going home.  She has no further complaints at this time.  Staples removed yesterday.    Patient Vitals for the past 24 hrs:   BP Temp Temp src Pulse Resp SpO2 Weight   06/06/25 0546 139/66 98.2  F (36.8  C) Oral 89 20 96 % 101.9 kg (224 lb 9.6 oz)   06/05/25 2354 127/62 99  F (37.2  C) Oral -- 20 94 % --   06/05/25 1601 126/62 98  F (36.7  C) Oral 92 20 96 % --   06/05/25 0819 114/56 98  F (36.7  C) Oral -- 18 93 % --         PHYSICAL EXAM:  General: patient seen resting in bed, no acute distress  Resp: no respiratory distress, breathing comfortably on room air.   Abdomen: Obese abdomen.  Improvement of abdominal tenderness to palpation over the right lower quadrant and Pfannenstiel incision.  The rest of the surgical sites are clean and healing well.  Musculoskeletal: Good range of motion.  Good strength.    Flaco Mendoza DO  General Surgeon  [facility]  [place]  [facility] - [address]  [place] [address]?  Office: [phone]  Employed by - [facility]  Pager: [phone]

## 2025-06-06 NOTE — PLAN OF CARE
Problem: Skin Injury Risk Increased  Goal: Skin Health and Integrity  Outcome: Progressing     Problem: Adult Inpatient Plan of Care  Goal: Absence of Hospital-Acquired Illness or Injury  Intervention: Prevent Skin Injury  Recent Flowsheet Documentation  Taken 6/6/2025 0200 by Aldo Erazo RN  Body Position: position changed independently  Taken 6/5/2025 2000 by Aldo Erazo RN  Body Position: position changed independently     Problem: Pain Acute  Goal: Optimal Pain Control and Function  Outcome: Progressing     Problem: Infection  Goal: Absence of Infection Signs and Symptoms  Outcome: Progressing   Goal Outcome Evaluation:    Pt A/Ox4,  pleasant, calls appropriately,  VS on RA, O2 Sats WNL. Rated pain this morning 3/10, feeling much better. PRN dilaudid, tylenol and scheduled Toradol administered , Pt walks to the bathroom with standby assist x 1 . Lap sites clean dry and intact.  Dressing intact to wound site, where the drain was removed. Tele Sinus rhythm.  On ATB,  no s/s of adverse reaction noted. Standby assist x 1 .

## 2025-06-06 NOTE — PLAN OF CARE
Goal Outcome Evaluation:    Pt is discharge home via . Oxycodone prescription and discharge meds are given to pt. Explained AVS to pt.

## 2025-06-06 NOTE — PROGRESS NOTES
[facility] ID Inpatient follow up       Patient:  Gia Sultana  Date of birth 1965, Medical record number [id number]  Date of Visit:  06/06/2025  Attending Physician: Phill Lubin MD         Assessment and Recommendations:   Assessment:  Gia Sultana is a 59 year old female with   Morbid obesity with BMI of 39.  History of perforated diverticulitis.  Status post IR guided drain placement.  Recent history of sigmoid colectomy with splenic flexure takedown on 5/22/2025.  Readmitted on 6/1/2025 with postoperative fluid collection associated with leukocytosis.  Started on Cipro and Flagyl.  IV vancomycin added on 6/3/2025.  She is MRSA negative on screening.  Clinically having some purulent drainage from BRITT site on the right flank as well as right lower quadrant tenderness. Repeat CT on 6/4 with no abscess. WBC improved down to 11. On IV Vanco + Cipro + Flagyl. Cultures from wound with MSSA. Of note strain resistant to Levaquin and Cipro, likely reason for the worsening while on Cipro before initiation of therapy with Vanco.   Positive C. difficile testing consistent with colonization.  P.o. vancomycin 125 mg started on 6/4/2025.  Reported 1 semiformed stool on 6/3/2025.  C. difficile is unlikely. PO Vanco discontinued. WBC continues to improve and no diarrhea. Clinical colonization with C Diff.       Recommendations:  Discontinue IV Vanco, Cipro and Flagyl   Start PO Doxy 100 mg BID + PO Flagyl 500 mg TID   Can discharge from the ID standpoint on 10 days of the above regimen.   ID discharge orders placed.     Discussed with the patient and nursing staff    ID will sign off.      Keyon Cheng MD.  [facility].   [facility]  Office Telephone [phone].  Fax [phone]  [facility] paging            Interval History:     Doing well today.  Has no complaint.  Overall feels that she is improving.  WBC now almost  "normal.    Pertinent cultures include:  No results found for: \"CULT\"    Recent Inflammatory Biomarkers:   Recent Labs   Lab Test 25  0724 25  0716 25  0611 25  0651 25  0438 25  1715 25  0637 25  0645   PCAL  --   --   --   --   --   --   --  0.28   WBC 11.4* 14.4* 21.5* 25.5* 16.5* 16.5*   < > 13.0*    < > = values in this interval not displayed.            Review of Systems:   CONSTITUTIONAL:    Temp Max: Temp (24hrs), Av  F (36.7  C), Min:97.8  F (36.6  C), Max:98.1  F (36.7  C)   .  Negative except for findings in the HPI.           Current Medications (antimicrobials listed in bold):     Current Facility-Administered Medications   Medication Dose Route Frequency Provider Last Rate Last Admin    cetirizine (zyrTEC) tablet 10 mg  10 mg Oral QPM Jose Walters MD   10 mg at 25 0843    ciprofloxacin (CIPRO) infusion 400 mg  400 mg Intravenous Q12H Jose Walters  mL/hr at 25 2020 400 mg at 25 2210    metroNIDAZOLE (FLAGYL) infusion 500 mg  500 mg Intravenous Q12H Jose Walters  mL/hr at 25 2127 500 mg at 25 0843    minoxidil (LONITEN) half-tab 1.25 mg  1.25 mg Oral QPM Jose Walters MD   1.25 mg at 25 2132    pantoprazole (PROTONIX) EC tablet 40 mg  40 mg Oral QPM Jose Walters MD   40 mg at 25 212    sodium chloride (PF) 0.9% PF flush 3 mL  3 mL Intracatheter Q8H LIZETH Jose Walters MD   3 mL at 25 213    vancomycin (VANCOCIN) 1,250 mg in sodium chloride 0.9 % 250 mL intermittent infusion  1,250 mg Intravenous Q12H Keyon Cheng MD   1,250 mg at 25 2314    venlafaxine (EFFEXOR XR) 24 hr capsule 75 mg  75 mg Oral QPM Jose Walters MD   75 mg at 25 213              Allergies:     Allergies   Allergen Reactions    Erythromycin Other (See Comments)     Abdominal cramping     Penicillin G Other (See Comments)     Turned yellow    Sulfa (Sulfonamide Antibiotics) [Sulfa " Antibiotics] Rash    Nexium [Esomeprazole] Palpitations     Felt like BP and HR increased/palitations.    Penicillins Rash     Occurred as a small child             Physical Exam:   Vitals were reviewed  Patient Vitals for the past 24 hrs:   BP Temp Temp src Pulse Resp SpO2 Weight   06/06/25 0817 (!) 144/65 98.5  F (36.9  C) Oral 93 18 93 % --   06/06/25 0546 139/66 98.2  F (36.8  C) Oral 89 20 96 % 101.9 kg (224 lb 9.6 oz)   06/05/25 2354 127/62 99  F (37.2  C) Oral -- 20 94 % --   06/05/25 1601 126/62 98  F (36.7  C) Oral 92 20 96 % --       Physical Examination:  Gen: Pleasant in no acute distress.  HEENT: NCAT. EOMI. PERRL.  Neck: No bruit, JVD or thyromegaly.  Lungs: Clear to ascultation bilat with no crackles or wheezes.  Card: RRR. NSR. No RMG. Peripheral pulses present and symmetric. No edema.  Abd:  No mass. Normal bowel sounds.  Less tenderness in right flank.  Skin: No rash.  Extr: No edema.  Neuro: Alert and oriented to place time and person.       Laboratory Data:   ID Labs:  Microbiology labs:  Reviewed.   Contains abnormal data Wound Aerobic Bacterial Culture Routine With Gram Stain  Order: [id number]   Collected 6/4/2025 11:18 AM       Status: Preliminary result    Test Result Released: No    Specimen Information: Abdomen; Wound   0 Result Notes  Culture Culture in progress   4+ Staphylococcus aureus Abnormal             Gram Stain Result  Abnormal   4+ Gram positive cocci   No white blood cells seen           Resulting Agency: [facility]     Susceptibility     Staphylococcus aureus     FREDERICK     Ciprofloxacin >=8 ug/mL Resistant *     Clindamycin 0.25 ug/mL Susceptible 1     Daptomycin 0.5 ug/mL Susceptible     Doxycycline <=0.5 ug/mL Susceptible     Erythromycin >=8 ug/mL Resistant     Gentamicin <=0.5 ug/mL Susceptible     Inducible macrolide resistance test Negative ug/mL Negative *     Levofloxacin 4 ug/mL Resistant *     Linezolid 2 ug/mL Susceptible *     Moxifloxacin 2 ug/mL Resistant *      Nitrofurantoin <=16 ug/mL Susceptible *     Oxacillin 0.5 ug/mL Susceptible 2     Rifampin <=0.5 ug/mL Susceptible *     Tetracycline <=1 ug/mL Susceptible     Tigecycline <=0.12 ug/mL Susceptible *     Trimethoprim/Sulfamethoxazole <=0.5/9.5 u... Susceptible     Vancomycin 1 ug/mL Susceptible              * Suppressed Antibiotic   1 This isolate DOES NOT demonstrate inducible clindamycin resistance in vitro. Clindamycin is susceptible and could be used when indicated, however, erythromycin is resistant and should not be used.   2 Oxacillin susceptible isolates are susceptible to cephalosporins (example: cefazolin and cephalexin) and beta lactam combination agents. Oxacillin resistant isolates are resistant to these agents.            Specimen Collected: 06/04/25 11:18 AM Last Resulted: 06/06/25  2:07 AM     No lab results found.  Recent Labs   Lab Test 06/06/25  0724 06/05/25  0716 06/04/25  0611 06/03/25  0651 06/02/25  0438 06/01/25  1715   WBC 11.4* 14.4* 21.5* 25.5* 16.5* 16.5*     Recent Labs   Lab Test 06/06/25  0724 06/05/25  0716 06/04/25  0611 06/03/25  0651   CR 0.80 0.84 0.65 0.67   GFRESTIMATED 84 79 >90 >90       Hematology Studies  Recent Labs   Lab Test 06/06/25  0724 06/05/25  0716 06/04/25  0611 06/03/25  0651 06/02/25  0438 06/01/25  1715 05/25/25  0637   WBC 11.4* 14.4* 21.5* 25.5* 16.5* 16.5* 10.9   ANEU 8.1 11.0* 18.1*  --  12.9* 12.3* 6.6   AEOS 0.7 0.4 0.3  --  0.1 0.3 0.5   HGB 10.3* 10.4* 11.1* 13.0 13.3 14.3 12.5   HCT 32.4* 32.6* 34.8* 39.2 40.0 43.0 40.0   * 475* 484* 482* 482* 511* 304       Metabolic  Recent Labs   Lab Test 06/06/25  0724 06/05/25  0716 06/04/25  0611    143 135   BUN 11.0 9.5 6.1*   CO2 30* 29 31*   CR 0.80 0.84 0.65   GFRESTIMATED 84 79 >90       Hepatic Studies  Recent Labs   Lab Test 06/06/25  0724 06/05/25  0716 06/02/25  0438 06/01/25  1715 05/24/25  0645 12/30/24  1147 08/05/24  1152 11/17/22  0928   BILITOTAL  --   --  0.4 0.4  --  0.2  --  0.4    ALKPHOS  --   --  106 106  --  110  --  83   ALBUMIN 2.9* 3.0* 3.4* 3.6   < > 3.7  --  5.0   AST  --   --  19 27  --   --   --  26   ALT  --   --  25 26  --   --  50 26    < > = values in this interval not displayed.       ImmunologlobulinsNo lab results found.         Imaging Data:   Reviewed     Study Result    Narrative & Impression   EXAM: CT ABDOMEN PELVIS W CONTRAST  LOCATION: [facility]  DATE: 6/4/2025     INDICATION: Right lower quadrant pain.  Look for abscess.  COMPARISON: 6/1/2025  TECHNIQUE: CT scan of the abdomen and pelvis was performed following injection of IV contrast. Multiplanar reformats were obtained. Dose reduction techniques were used.  CONTRAST: 90ml isovue 370     FINDINGS:   LOWER CHEST: Linear discoid atelectasis or scar at lung bases.     HEPATOBILIARY: Cholecystectomy.     PANCREAS: Normal.     SPLEEN: Normal.     ADRENAL GLANDS: Normal.     KIDNEYS/BLADDER: Small renal cysts need no further workup. Small calcification left mid kidney appears to be cortical. There is no hydronephrosis. Bladder is collapsed and empty, there does appear to be some bladder wall thickening.     BOWEL: Previous sigmoid resection. No obstruction. There is been interval removal of the long surgical drain that entered from right low flank, looped into the pelvis and extended into the left midabdomen; there is a slight amount of fluid and air are   seen along the track of this tube but there is no suspicious or undrained fluid collections.     LYMPH NODES: No significant lymphadenopathy.     VASCULATURE: Normal.     PELVIC ORGANS: No pelvic mass or free fluid.     MUSCULOSKELETAL: Low transverse incision with skin staples still present. Soft tissue stranding and minimal intramuscular fluid at site of low incision but decreased size compared the previous study. Likewise there is intramuscular thickening along   lateral right flank at site of previous large bore peritoneal drain but no  suspicious abdominal wall collection.                                                                      IMPRESSION:   1.  Interval removal of long surgical drain. No residual or suspicious fluid collection.

## 2025-06-10 ENCOUNTER — PATIENT OUTREACH (OUTPATIENT)
Dept: CARE COORDINATION | Facility: CLINIC | Age: 60
End: 2025-06-10
Payer: COMMERCIAL

## 2025-06-10 NOTE — PROGRESS NOTES
[facility]:   [facility] Contact  [facility]/Voicemail     Clinical Data: Post-Discharge Outreach     Outreach attempted x 2.  Left message on patient's voicemail, providing [facility]'s central phone number of [phone] ([phone]) for questions/concerns and/or to schedule an appt with an [facility] provider, if they do not have a PCP.      Plan:  [facility] will do no further outreaches at this time.       Altagracia Henry  Community Health Worker  [facility], [facility]  [phone]      *Connected Care Resource Team does NOT follow patient ongoing. Referrals are identified based on internal discharge reports and the outreach is to ensure patient has an understanding of their discharge instructions.

## 2025-06-14 NOTE — DISCHARGE SUMMARY
[facility]  Hospitalist Discharge Summary      Date of Admission:  6/1/2025  Date of Discharge:  6/6/2025  3:05 PM  Discharging Provider: Georges Bales MD  Discharge Service: Hospitalist Service    Discharge Diagnoses       Gia Sultana is a 59-year-old female with past medical history of obesity, mood disorder and history of diverticulitis with perforation that required IR placement of drainage tube and recent hospitalization for planned sigmoid colectomy with splenic flexure takedown on 5/22/2025 who presented to the emergency room for worsening abdominal pain.  Found to have postoperative fluid collection with seroma.       6/6 :     Medically ready  Discharge today        A/p :        Abdominal pain with postoperative seroma  Cellulitis around drainage site of abdominal wall  Leukocytosis   - S/P uncomplicated Robot assisted laparoscopic sigmoid colectomy with splenic flexure takedown on 5/22/2025  - CT abdomen shows 5 cm focal fluid collection without enhancing rim seen in the lower ventral abdominal wall with overlying subcutaneous fat stranding, likely postoperative seroma  - Normal lactic acid but elevated white blood cells, CRP elevated 158.2  - Surgical consult, appreciate input  - Drain removed by surgery 6/2  - Continue IV antibiotic empirically with Flagyl and Cipro, vancomycin started 6/3  - Low fiber diet  - Received IV fluids 6/1 to 6/2, now discontinued  - Oral Dilaudid as needed  - UA contaminated with 10 squamous cells  - Ucx pending  - C. Dif showing carriage, toxigenic disease negative  - MRSA swab ordered  - ID consulted  - Repeat CT scan 6/4 with resolving fluid collections    6/6 : Discontinue IV Vanco, Cipro and Flagyl, Start PO Doxy 100 mg BID + PO Flagyl 500 mg TID        Depression/anxiety  - Continue PTA venlafaxine     Hypomagnesemia  -Replace per protocol     Obesity class II  - PTA Wegovy on hold while inpatient      Hair loss  -Continue PTA minoxidil    "    Clinically Significant Risk Factors     # Morbid Obesity: Estimated body mass index is 41.08 kg/m  as calculated from the following:    Height as of this encounter: 1.575 m (5' 2\").    Weight as of this encounter: 101.9 kg (224 lb 9.6 oz).       Follow-ups Needed After Discharge   Follow-up Appointments       Hospital Follow-up with Existing Primary Care Provider (PCP)          Schedule Primary Care visit within: 7 Days           {Additional follow-up instructions/to-do's for PCP    : none    Unresulted Labs Ordered in the Past 30 Days of this Admission       No orders found from 5/2/2025 to 6/2/2025.        These results will be followed up by none    Discharge Disposition   Discharged to home  Condition at discharge: Stable      Consultations This Hospital Stay   SURGERY GENERAL IP CONSULT  PHYSICAL THERAPY ADULT IP CONSULT  OCCUPATIONAL THERAPY ADULT IP CONSULT  PHARMACY TO DOSE VANCO  INFECTIOUS DISEASES IP CONSULT  PHARMACY TO DOSE VANCO    Code Status   Prior    Time Spent on this Encounter   IGeorges MD, personally saw the patient today and spent greater than 30 minutes discharging this patient.       Georges Bales MD  [place] [id number]  Phone: [phone]  Fax: [phone]  ______________________________________________________________________    Physical Exam   Vital Signs:                    Weight: 224 lbs 9.6 oz         GENERAL: The patient is not in any acute distressed. Awake and alert.  HEENT: Nonicteric sclerae, PERRLA, EOMI. Oropharynx clear. Moist mucous membranes. Conjunctivae appear well perfused.  HEART: Regular rate and rhythm without murmurs.  LUNGS: Clear to auscultation bilaterally. No wheezing or crackles.  ABDOMEN: Soft, positive bowel sounds, nontender.  SKIN: No rash, no excessive bruising, petechiae, or purpura.  EXTREMITIES : no rashes, no swelling in legs.  NEUROLOGIC: conscious and oriented, follows " commands, no obvious focal deficits.  ROS: All other systems negative          Primary Care Physician   Mansi Mccracken    Discharge Orders      Reason for your hospital stay    Abdominal pain     Activity    Your activity upon discharge: activity as tolerated     Diet    Follow this diet upon discharge: Current Diet:Orders Placed This Encounter      Low Fiber Diet     Hospital Follow-up with Existing Primary Care Provider (PCP)            Significant Results and Procedures   Most Recent 3 CBC's:  Recent Labs   Lab Test 06/06/25  0724 06/05/25  0716 06/04/25  0611   WBC 11.4* 14.4* 21.5*   HGB 10.3* 10.4* 11.1*   MCV 87 88 86   * 475* 484*     Most Recent 3 BMP's:  Recent Labs   Lab Test 06/06/25  0724 06/05/25  0716 06/04/25  0611    143 135   POTASSIUM 3.9 3.9 3.3*   CHLORIDE 104 106 98   CO2 30* 29 31*   BUN 11.0 9.5 6.1*   CR 0.80 0.84 0.65   ANIONGAP 7 8 6*   TONY 8.6* 9.0 8.7*   * 107* 128*     Most Recent 2 LFT's:  Recent Labs   Lab Test 06/02/25  0438 06/01/25  1715   AST 19 27   ALT 25 26   ALKPHOS 106 106   BILITOTAL 0.4 0.4     Most Recent 3 INR's:No lab results found.    Discharge Medications   Discharge Medication List as of 6/6/2025  2:42 PM        START taking these medications    Details   doxycycline monohydrate (MONODOX) 100 MG capsule Take 1 capsule (100 mg) by mouth every 12 hours for 10 days., Disp-20 capsule, R-0, E-Prescribe      metroNIDAZOLE (FLAGYL) 500 MG tablet Take 1 tablet (500 mg) by mouth 3 times daily for 10 days., Disp-30 tablet, R-0, E-Prescribe      ondansetron (ZOFRAN ODT) 4 MG ODT tab Take 1 tablet (4 mg) by mouth every 8 hours as needed for nausea., Disp-30 tablet, R-0, E-Prescribe           CONTINUE these medications which have CHANGED    Details   oxyCODONE (ROXICODONE) 5 MG tablet Take 0.5-1 tablets (2.5-5 mg) by mouth every 6 hours as needed for moderate to severe pain., Disp-15 tablet, R-0, Local Print           CONTINUE these medications which have NOT  CHANGED    Details   acetaminophen (TYLENOL) 325 MG tablet Take 325-975 mg by mouth every 6 hours as needed for mild pain (max of 4,000 mg in 24 hours)., Historical      cetirizine (ZYRTEC) 10 MG tablet Take 10 mg by mouth daily., Historical      ibuprofen (ADVIL/MOTRIN) 200 MG tablet Take 2-3 tablets (400-600 mg) by mouth every 6 hours as needed for pain., OTC      minoxidil (LONITEN) 2.5 MG tablet Take 1.25 mg by mouth daily. Hair growth, Historical      omeprazole (PRILOSEC) 20 MG DR capsule TAKE 1 CAPSULE BY MOUTH EVERY DAY, Disp-90 capsule, R-2, E-Prescribe      polyethylene glycol (MIRALAX) 17 GM/Dose powder Take 17 g (1 Capful) by mouth daily as needed for constipation., OTC      Semaglutide-Weight Management (WEGOVY) 2.4 MG/0.75ML pen Inject 2.4 mg subcutaneously once a week., Disp-3 mL, R-5, E-Prescribe      venlafaxine (EFFEXOR XR) 75 MG 24 hr capsule TAKE 1 CAPSULE BY MOUTH EVERY DAY, Disp-90 capsule, R-2, E-Prescribe           Allergies   Allergies   Allergen Reactions    Erythromycin Other (See Comments)     Abdominal cramping     Penicillin G Other (See Comments)     Turned yellow    Sulfa (Sulfonamide Antibiotics) [Sulfa Antibiotics] Rash    Nexium [Esomeprazole] Palpitations     Felt like BP and HR increased/palitations.    Penicillins Rash     Occurred as a small child

## 2025-06-20 ENCOUNTER — NURSE TRIAGE (OUTPATIENT)
Dept: FAMILY MEDICINE | Facility: CLINIC | Age: 60
End: 2025-06-20
Payer: COMMERCIAL

## 2025-06-20 NOTE — TELEPHONE ENCOUNTER
Writer called the patient to gather more information about the patient's continued symptoms, on 6/20/25, and left a message to return call.    When the patient calls back, please route the patient to the RN queue to gather more information about here current symptom per provider request.    Juana Farias RN, BSN  [facility]

## 2025-06-20 NOTE — TELEPHONE ENCOUNTER
"Nurse Triage SBAR    Is this a 2nd Level Triage? YES, LICENSED PRACTITIONER REVIEW IS REQUIRED    Situation:   Patient reported completing antibiotics Tuesday and having what she believes is thrush.    Background:   Patient recently hospitalized and put on antibiotics for cellulitis/staph infection. Completed 10 day course of doxycycline and flagyl. Patient has appointment with PCP next Tuesday for follow up.    Assessment:   Patient reporting bad taste in her mouth and white secretions. Noting she feels the antibiotics have helped but \"maybe infection is not completely gone\".    Protocol Recommended Disposition:   See in Office Today or Tomorrow    Recommendation:  Gave care advice, patient refusing disposition noting she does not have a ride. Routing to PCP and team to advise.    Routed to provider    Does the patient meet one of the following criteria for ADS visit consideration? 16+ years old, with an [facility] PCP     TIP  Providers, please consider if this condition is appropriate for management at one of our Acute and Diagnostic Services sites.     If patient is a good candidate, please use dotphrase <dot>triageresponse and select Refer to ADS to document.    Reason for Disposition   Finished taking antibiotics and symptoms are BETTER but are not completely gone    Additional Information   Negative: SEVERE difficulty breathing (e.g., struggling for each breath, speaks in single words)   Negative: Sounds like a life-threatening emergency to the triager   Negative: Cellulitis (skin infection) and taking an antibiotic   Negative: Diarrhea and taking an antibiotic   Negative: Pneumonia and taking an antibiotic   Negative: Sinus infection and taking an antibiotic   Negative: Urinary tract infection (e.g., cystitis, pyelonephritis, urethritis) and female, and taking an antibiotic   Negative: Wound infection and taking an antibiotic   Negative: MODERATE difficulty breathing (e.g., speaks in phrases, SOB even at rest, " "pulse 100-120)   Negative: Fever > 103 F  (39.4 C)   Negative: Patient sounds very sick or weak to the triager    Answer Assessment - Initial Assessment Questions  1. INFECTION: \"What infection is the antibiotic being given for?\"      Staph infection/ cellulitis  2. ANTIBIOTIC: \"What antibiotic are you taking\" \"How many times per day?\"      Doxycycline and flagyl    3. DURATION: \"When was the antibiotic started?\"      10 days ago/10 day therapy    4. MAIN CONCERN OR SYMPTOM:  \"What is your main concern right now?\"      Bad taste in mouth, white secretions concern for thrush    5. BETTER-SAME-WORSE: \"Are you getting better, staying the same, or getting worse compared to when you first started the antibiotics?\" If getting worse, ask: \"In what way?\"       Not sure, finished the antibiotics Tuesday but maybe the infection is not all the way cleared up, but not that bad\"    6. FEVER: \"Do you have a fever?\" If Yes, ask: \"What is your temperature, how was it measured, and when did it start?\"      Denies    7. SYMPTOMS: \"Are there any other symptoms you're concerned about?\" If Yes, ask: \"When did it start?\"      Not able to visualize surgical sight, denies any other symptoms at this time.    8. FOLLOW-UP APPOINTMENT: \"Do you have a follow-up appointment with your doctor?\"      Yes    Protocols used: Infection on Antibiotic Follow-up Call-A-[place]    "

## 2025-06-20 NOTE — TELEPHONE ENCOUNTER
Writer called the patient and left a message to return call.    When the patient calls back, please route to the RN queue to please gather more information about the patient's symptoms, as Dr. Mckoy reviewed the nurse triage from 6/20/25, but was unsure if the patient is concerned about still having diverticulitis symptoms or is the patient concerned more about possible oral thrush.    Juana Farias RN, BSN  [facility]

## 2025-06-23 NOTE — TELEPHONE ENCOUNTER
Called and spoke to patient, she stated that she is seeing Dr. Mccracken on 6/24/25 and will discuss it with her then.     Felipe Stack RN  [facility]

## 2025-06-23 NOTE — TELEPHONE ENCOUNTER
See response from the provider, to the patient, on 6/22/25, regarding the patient's possible thrush.    Writer called the patient and left a message to return call.    When the patient calls back, please route her to the RN queue for possible nurse triage about mouth symptoms.    Juana Farias, RN, BSN  [facility]

## 2025-06-24 ENCOUNTER — OFFICE VISIT (OUTPATIENT)
Dept: FAMILY MEDICINE | Facility: CLINIC | Age: 60
End: 2025-06-24
Attending: INTERNAL MEDICINE
Payer: COMMERCIAL

## 2025-06-24 VITALS
HEIGHT: 61 IN | RESPIRATION RATE: 15 BRPM | WEIGHT: 208 LBS | HEART RATE: 96 BPM | OXYGEN SATURATION: 97 % | BODY MASS INDEX: 39.27 KG/M2 | TEMPERATURE: 98.5 F | DIASTOLIC BLOOD PRESSURE: 89 MMHG | SYSTOLIC BLOOD PRESSURE: 143 MMHG

## 2025-06-24 DIAGNOSIS — K57.20 COLONIC DIVERTICULAR ABSCESS: ICD-10-CM

## 2025-06-24 DIAGNOSIS — I10 ESSENTIAL HYPERTENSION, MALIGNANT: ICD-10-CM

## 2025-06-24 DIAGNOSIS — L03.311 CELLULITIS OF ABDOMINAL WALL: Primary | ICD-10-CM

## 2025-06-24 PROCEDURE — 99214 OFFICE O/P EST MOD 30 MIN: CPT | Performed by: FAMILY MEDICINE

## 2025-06-24 PROCEDURE — 3077F SYST BP >= 140 MM HG: CPT | Performed by: FAMILY MEDICINE

## 2025-06-24 PROCEDURE — 1125F AMNT PAIN NOTED PAIN PRSNT: CPT | Performed by: FAMILY MEDICINE

## 2025-06-24 PROCEDURE — 3079F DIAST BP 80-89 MM HG: CPT | Performed by: FAMILY MEDICINE

## 2025-06-24 ASSESSMENT — PAIN SCALES - GENERAL: PAINLEVEL_OUTOF10: MILD PAIN (2)

## 2025-06-24 NOTE — PATIENT INSTRUCTIONS
Follow a low-salt diet, aiming for 2000 mg of sodium less per day.    Okay to gradually reintroduce fiber as tolerated.    Check your blood pressures at home.  Make sure you are resting for 5 minutes before checking your blood pressure.  Update us with your home blood pressures via avandeohart in 2 to 3 weeks.  If you do not have a home blood pressure cuff, please schedule a nurse only blood pressure check in 2 to 3 weeks to recheck your blood pressure.  If persistent elevation, we may need to consider resuming your lisinopril hydrochlorothiazide at a low dose.

## 2025-06-24 NOTE — PROGRESS NOTES
Cellulitis of abdominal wall  Resolved with completion of antibiotic course.  Steady improvement in abdominal tenderness which is likely related postoperative changes and postoperative inflammation and infection and may take some time to resolve.  Reviewed warning signs and symptoms of worsening condition and when to seek follow-up care.    Colonic diverticular abscess  Resolved following surgery.  No need for follow-up with surgery again unless new symptoms developing.  Continue to focus on hydration.  Gradually reintroduce fiber.    Essential hypertension, malignant  Blood pressure today mildly elevated.  History of requiring lisinopril hydrochlorothiazide in the past but discontinued following hospitalization January 2025.  I'd like her check blood pressures at home over the next 2 to 3 weeks, then send Epivios message with home blood pressure results or schedule nurse only blood pressure check in 2 to 3 weeks to determine if medication should be resumed.  Discussed importance of reducing salt intake, maximum of 2000 mg daily.      32 minutes spent by me on the date of the encounter doing chart review, history and exam, documentation and further activities per the note    Subjective   Gia is a 59 year old, presenting for the following health issues:  Hospital F/U (Staff infection from colon surgery )      6/24/2025     8:58 AM   Additional Questions   Roomed by shukri WHTYE      History of Present Illness-  Post-Surgical Complications:  - Underwent partial colon resection, laparoscopic robotic assisted, 5/22/2025 due to diverticulitis with abscess.  - Hospitalized for 5 days and discharged without complication, then felt worse 6 days after discharge.- Had a drainage tube in place post-surgery.  - Presented to emergency room, CT showing 3 cm of focal fluid collection from seroma, redness at right abdominal drain, concerning for infection.  Wound culture growing staph.  - Experienced significant pain in the  right lower abdomen and fluid accumulation on the left lower side.  - treated with vancomycin initially, then switched to oral antibiotics due to suspected C. diff infection.  - Seen by infectious disease team who felt the C. difficile test was suggestive of carrier status but not due to acute infection.  - Surgical drain was pulled early on during hospitalization and did not require replacement.    - No new procedures were required during the hospital stay.    Medication Side Effects:  - Experienced a foul taste in the mouth attributed to metronidazole.  -Concerned it could be due to thrush.  - Taste improved after discontinuing medication for a week, though not resolved.    Weight and Fluid Retention:  - Gained 15 lbs during the last hospital stay due to fluid retention; weight has since been lost.  - Pain associated with fluid retention.  - Seems to have lost weight now, continued Wegovy throughout most recent hospitalization.    Dietary Adjustments:  - Following a low-fiber diet post-hospitalization.  - Increased salt intake noted, possibly affecting blood pressure.    Urinary Symptoms:  - Frequent urge to defecate with small volumes, possibly related to post-surgical colon inflammation.    Hospital Follow-up Visit:    Hospital/Nursing Home/IP Rehab Facility: [facility]  Most Recent Admission Date: 6/1/2025   Most Recent Admission Diagnosis: Intra-abdominal fluid collection - R18.8  Right lower quadrant abdominal pain - R10.31     Most Recent Discharge Date: 6/6/2025   Most Recent Discharge Diagnosis: Intra-abdominal fluid collection - R18.8  Right lower quadrant abdominal pain - R10.31  Diverticulitis of large intestine with perforation and abscess, unspecified bleeding status - K57.20  Diverticular disease of large intestine - K57.30   Do you have any other stressors you would like to discuss with your provider? No    Problems taking medications regularly:  None  Medication changes  "since discharge: None  Problems adhering to non-medication therapy:  None    Summary of hospitalization:  [facility] discharge summary reviewed  Diagnostic Tests/Treatments reviewed.  Follow up needed: Labs, CT imaging, wound culture  Other Healthcare Providers Involved in Patient s Care:         General Surgery, infectious disease, follow-up is not required for either  Update since discharge: improved.         Plan of care communicated with patient                       Objective    BP (!) 143/89 (BP Location: Left arm, Patient Position: Sitting, Cuff Size: Adult Large)   Pulse 96   Temp 98.5  F (36.9  C) (Temporal)   Resp 15   Ht 1.55 m (5' 1.02\")   Wt 94.3 kg (208 lb)   SpO2 97%   BMI 39.27 kg/m    Body mass index is 39.27 kg/m .  Physical Exam   Alert and pleasant.  Able to arise from chair and ambulate without difficulty.  Mucous membranes moist.  She does have a little thickening in tan discoloration at the surface of her tongue but no adherent plaque, no open sores, no lesions elsewhere in her mouth.  Heart with regular rate and rhythm.  Lungs clear.  Abdominal wounds healing well.  There is an adherent scab of the 3 inch lower abdominal incision.  Laparoscopic port wounds healing well.  Drain location at right abdomen is with significant central eschar but without any surrounding erythema, warmth, or fluid collection.  Mild tenderness throughout abdomen, no rebound or guarding.  Extremities without edema.            Signed Electronically by: Mansi Mccracken MD    "

## 2025-06-29 DIAGNOSIS — E66.812 CLASS 2 SEVERE OBESITY DUE TO EXCESS CALORIES WITH SERIOUS COMORBIDITY AND BODY MASS INDEX (BMI) OF 39.0 TO 39.9 IN ADULT (H): ICD-10-CM

## 2025-06-29 DIAGNOSIS — E78.5 DYSLIPIDEMIA: ICD-10-CM

## 2025-06-29 DIAGNOSIS — E66.01 CLASS 2 SEVERE OBESITY DUE TO EXCESS CALORIES WITH SERIOUS COMORBIDITY AND BODY MASS INDEX (BMI) OF 39.0 TO 39.9 IN ADULT (H): ICD-10-CM

## 2025-06-29 DIAGNOSIS — E88.810 METABOLIC SYNDROME X: ICD-10-CM

## 2025-06-29 DIAGNOSIS — I10 ESSENTIAL HYPERTENSION: ICD-10-CM

## 2025-06-30 RX ORDER — SEMAGLUTIDE 2.4 MG/.75ML
2.4 INJECTION, SOLUTION SUBCUTANEOUS WEEKLY
Qty: 2 ML | Refills: 2 | Status: SHIPPED | OUTPATIENT
Start: 2025-06-30

## 2025-08-13 ENCOUNTER — TELEPHONE (OUTPATIENT)
Dept: SURGERY | Facility: CLINIC | Age: 60
End: 2025-08-13
Payer: COMMERCIAL

## 2025-08-14 ENCOUNTER — DOCUMENTATION ONLY (OUTPATIENT)
Dept: SURGERY | Facility: CLINIC | Age: 60
End: 2025-08-14
Payer: COMMERCIAL

## (undated) DEVICE — DAVINCI XI DRAPE COLUMN 470341

## (undated) DEVICE — SOL NACL 0.9% INJ 1000ML BAG 2B1324X

## (undated) DEVICE — CONNECTOR URETERAL CATH 14FR GOLDBERG 050049

## (undated) DEVICE — Device

## (undated) DEVICE — DRAPE POUCH INSTRUMENT 3 POCKET 1018L

## (undated) DEVICE — CUSTOM PACK DA VINCI GYN SMA5BDVHEA

## (undated) DEVICE — STPL CIRCULAR 31MM CVD CDH31P

## (undated) DEVICE — ENDO SHEARS RENEW LAP ENDOCUT SCISSOR TIP 16.5MM 3142

## (undated) DEVICE — DAVINCI XI SUCTION IRRIGATOR ENDOWRIST 480299

## (undated) DEVICE — DAVINCI XI DRAPE ARM 470015

## (undated) DEVICE — SYR 50ML SLIP TIP W/O NDL 309654

## (undated) DEVICE — DAVINCI HOT SHEARS TIP COVER  400180

## (undated) DEVICE — PREP CHLORAPREP 26ML TINTED HI-LITE ORANGE 930815

## (undated) DEVICE — STPL DAVINCI SUREFORM 30MM RELOAD BLUE 48230B

## (undated) DEVICE — MAT FLOOR WATERPROOF BACKSHEET FMBP30

## (undated) DEVICE — STPL SKIN 35W 6.9MM  PXW35

## (undated) DEVICE — LUBRICANT INST ELECTROLUBE EL101

## (undated) DEVICE — GOWN XLG DISP 9545

## (undated) DEVICE — GLOVE UNDER INDICATOR PI SZ 7.0 LF 41670

## (undated) DEVICE — SU VICRYL+ 3-0 27IN SH UND VCP416H

## (undated) DEVICE — JELLY LUBRICATING SURGILUBE 2OZ TUBE

## (undated) DEVICE — SOL WATER IRRIG 1000ML BOTTLE 2F7114

## (undated) DEVICE — SU DERMABOND ADVANCED .7ML DNX12

## (undated) DEVICE — SUCTION STRYKERFLOW II 250-070-500

## (undated) DEVICE — PREP POVIDONE-IODINE 10% SOLUTION 4OZ BOTTLE MDS093944

## (undated) DEVICE — DRAPE SHEET TABLE COVER KC 42301*

## (undated) DEVICE — DRAIN RESERVOIR 100ML JP 0070740

## (undated) DEVICE — CATH URETERAL FLEXI TIP 5FR 70CM

## (undated) DEVICE — SYR 05ML LL W/O NDL

## (undated) DEVICE — TUBING IRR LG BORE TUBE DRIP CHMBR 2 BG 94IN 313003

## (undated) DEVICE — SU WND CLOSURE VLOC 180 ABS 2-0 12" GS-21 VLOCL0315

## (undated) DEVICE — KIT SIGMOIDOSCOPE ENDOSCOPIC LIGHTED 18FR KI613/10

## (undated) DEVICE — DAVINCI XI HANDPIECE ESU VESSEL SEALER 8MM EXT 480422

## (undated) DEVICE — GUIDEWIRE SENSOR DUAL FLEX STR 0.035"X150CM M0066703080

## (undated) DEVICE — SUCTION MANIFOLD NEPTUNE 2 SYS 1 PORT 702-025-000

## (undated) DEVICE — DRSG PRIMAPORE 03 1/8X6" 66000318

## (undated) DEVICE — GLOVE BIOGEL PI ULTRATOUCH G SZ 7.0 42170

## (undated) DEVICE — SU SILK 0 24" TIE SA76G

## (undated) DEVICE — KIT POSITIONER NUBLUE XL TRND CHST PD BD STRAP TP3000E-S-NB

## (undated) DEVICE — CONTAINER URINE SPEC 4OZ STRL 1053

## (undated) DEVICE — SOL NACL 0.9% IRRIG 3000ML BAG 2B7127

## (undated) DEVICE — SU SILK 2-0 FS-1 18" 685G

## (undated) DEVICE — NEEDLE HYPO 18X1-1/2 SAFETY 305918

## (undated) DEVICE — BAG URINARY DRAIN 2000ML LF 154002

## (undated) DEVICE — DAVINCI XI OBTURATOR BLADELESS 8MM 470359

## (undated) DEVICE — DRSG PRIMAPORE 02X3" 7133

## (undated) DEVICE — TUBING SMOKE EVAC PNEUMOCLEAR HIGH FLOW 0620050250

## (undated) DEVICE — DAVINCI XI SEAL UNIVERSAL 5-12MM 470500

## (undated) DEVICE — SU PDS II 0 CTX 60" Z990G

## (undated) DEVICE — BLADE KNIFE SURG 10 371110

## (undated) DEVICE — CUSTOM PACK CYSTO PREFERRED SOT5BCYHEA

## (undated) DEVICE — PREP DYNA-HEX 4% CHG SCRUB 4OZ BOTTLE MDS098710

## (undated) DEVICE — TUBING SUCTION MEDI-VAC 1/4"X20' N620A

## (undated) DEVICE — SYR 10ML LL W/O NDL 302995

## (undated) DEVICE — CATH URETERAL OPEN END 5FRX70CM M0064002010

## (undated) DEVICE — STPL DAVINCI SUREFORM 30X8MM 488530

## (undated) DEVICE — DRAPE UNDER BUTTOCK 89415

## (undated) DEVICE — SU PROLENE 2-0 SHDA 36" 8523H

## (undated) DEVICE — ESU PENCIL SMOKE EVAC W/ROCKER SWITCH 0703-047-000

## (undated) DEVICE — BLADE KNIFE SURG 11 371111

## (undated) DEVICE — NDL INSUFFLATION 13GA 120MM C2201

## (undated) RX ORDER — DEXAMETHASONE SODIUM PHOSPHATE 10 MG/ML
INJECTION, SOLUTION INTRAMUSCULAR; INTRAVENOUS
Status: DISPENSED
Start: 2025-05-22

## (undated) RX ORDER — LIDOCAINE HYDROCHLORIDE 10 MG/ML
INJECTION, SOLUTION EPIDURAL; INFILTRATION; INTRACAUDAL; PERINEURAL
Status: DISPENSED
Start: 2025-05-22

## (undated) RX ORDER — PHENYLEPHRINE HYDROCHLORIDE 10 MG/ML
INJECTION INTRAVENOUS
Status: DISPENSED
Start: 2025-05-22

## (undated) RX ORDER — PROPOFOL 10 MG/ML
INJECTION, EMULSION INTRAVENOUS
Status: DISPENSED
Start: 2025-05-22

## (undated) RX ORDER — INDOCYANINE GREEN AND WATER 25 MG
KIT INJECTION
Status: DISPENSED
Start: 2025-05-22

## (undated) RX ORDER — GLYCOPYRROLATE 0.2 MG/ML
INJECTION, SOLUTION INTRAMUSCULAR; INTRAVENOUS
Status: DISPENSED
Start: 2025-05-22

## (undated) RX ORDER — FENTANYL CITRATE 50 UG/ML
INJECTION, SOLUTION INTRAMUSCULAR; INTRAVENOUS
Status: DISPENSED
Start: 2024-12-30

## (undated) RX ORDER — ONDANSETRON 2 MG/ML
INJECTION INTRAMUSCULAR; INTRAVENOUS
Status: DISPENSED
Start: 2025-05-22

## (undated) RX ORDER — FENTANYL CITRATE 50 UG/ML
INJECTION, SOLUTION INTRAMUSCULAR; INTRAVENOUS
Status: DISPENSED
Start: 2025-05-22

## (undated) RX ORDER — LIDOCAINE HYDROCHLORIDE AND EPINEPHRINE 10; 10 MG/ML; UG/ML
INJECTION, SOLUTION INFILTRATION; PERINEURAL
Status: DISPENSED
Start: 2025-05-22